# Patient Record
Sex: MALE | Race: WHITE | Employment: OTHER | URBAN - METROPOLITAN AREA
[De-identification: names, ages, dates, MRNs, and addresses within clinical notes are randomized per-mention and may not be internally consistent; named-entity substitution may affect disease eponyms.]

---

## 2018-04-09 RX ORDER — LISINOPRIL 20 MG/1
20 TABLET ORAL DAILY
Qty: 60 TABLET | Refills: 3 | Status: SHIPPED | OUTPATIENT
Start: 2018-04-09 | End: 2018-12-03 | Stop reason: SDUPTHER

## 2018-07-04 ENCOUNTER — HOSPITAL ENCOUNTER (EMERGENCY)
Age: 78
Discharge: HOME | End: 2018-07-04
Attending: EMERGENCY MEDICINE
Payer: MEDICARE

## 2018-07-04 VITALS
TEMPERATURE: 97.6 F | RESPIRATION RATE: 16 BRPM | HEART RATE: 87 BPM | SYSTOLIC BLOOD PRESSURE: 133 MMHG | OXYGEN SATURATION: 97 % | HEIGHT: 70 IN | WEIGHT: 180 LBS | DIASTOLIC BLOOD PRESSURE: 61 MMHG | BODY MASS INDEX: 25.77 KG/M2

## 2018-07-04 DIAGNOSIS — S61.011A LACERATION OF RIGHT THUMB WITHOUT FOREIGN BODY WITHOUT DAMAGE TO NAIL, INITIAL ENCOUNTER: Primary | ICD-10-CM

## 2018-07-04 PROCEDURE — 99282 EMERGENCY DEPT VISIT SF MDM: CPT

## 2018-07-04 PROCEDURE — 12001 RPR S/N/AX/GEN/TRNK 2.5CM/<: CPT

## 2018-07-04 ASSESSMENT — ENCOUNTER SYMPTOMS
EYE DISCHARGE: 0
NAUSEA: 0
BACK PAIN: 0
EYE PAIN: 0
SORE THROAT: 0
EYE REDNESS: 0
ABDOMINAL PAIN: 0
SHORTNESS OF BREATH: 0
VOMITING: 0
SINUS PRESSURE: 0
COUGH: 0
WHEEZING: 0
DIARRHEA: 0

## 2018-07-04 NOTE — ED PROVIDER NOTES
normal heart sounds. No murmur heard. Pulmonary/Chest: Effort normal and breath sounds normal. No respiratory distress. He has no wheezes. He has no rales. Abdominal: Soft. Bowel sounds are normal. There is no tenderness. There is no rebound and no guarding. Musculoskeletal: He exhibits no edema. 1cm curved laceration to lateral aspect of distal R thumb   Neurological: He is alert and oriented to person, place, and time. No cranial nerve deficit. Coordination normal.   Skin: Skin is warm and dry. Nursing note and vitals reviewed. Lac Repair  Date/Time: 7/4/2018 6:39 PM  Performed by: Arlene Holbrook  Authorized by: Adrián De Leon     Consent:     Consent obtained:  Verbal    Consent given by:  Patient    Risks discussed:  Poor cosmetic result and poor wound healing  Anesthesia (see MAR for exact dosages): Anesthesia method:  None  Laceration details:     Location:  Finger    Finger location:  R thumb    Length (cm):  1    Depth (mm):  1  Repair type:     Repair type:  Simple  Treatment:     Area cleansed with:  Soap and water and Shur-Clens    Amount of cleaning:  Standard  Skin repair:     Repair method:  Steri-Strips and tissue adhesive    Number of Steri-Strips:  2  Approximation:     Approximation:  Close  Post-procedure details:     Dressing:  Open (no dressing)    Patient tolerance of procedure: Tolerated well, no immediate complications        MDM    Patient presents to the ED for R finger laceration. Laceration was repaired primarily with steri strips and tissue adhesive. He will f/u with his PMD for wound evaluation. ED Course as of Jul 04 1842   Wed Jul 04, 2018   Wilmer Coreas 1 ATTENDING PROVIDER ATTESTATION:     I have personally performed and/or participated in the history, exam, medical decision making, and procedures and agree with all pertinent clinical information unless otherwise noted.     I have also reviewed and agree with the past medical, family and social history unless 97.6 °F (36.4 °C) (Oral)   Resp 16   Ht 5' 10\" (1.778 m)   Wt 180 lb (81.6 kg)   SpO2 97%   BMI 25.83 kg/m²   Oxygen Saturation Interpretation: Normal      ------------------------------------------ PROGRESS NOTES ------------------------------------------  6:42 PM  I have spoken with the patient and discussed todays results, in addition to providing specific details for the plan of care and counseling regarding the diagnosis and prognosis. Their questions are answered at this time and they are agreeable with the plan. I discussed at length with them reasons for immediate return here for re evaluation. They will followup with their primary care physician by calling their office tomorrow. --------------------------------- ADDITIONAL PROVIDER NOTES ---------------------------------  At this time the patient is without objective evidence of an acute process requiring hospitalization or inpatient management. They have remained hemodynamically stable throughout their entire ED visit and are stable for discharge with outpatient follow-up. The plan has been discussed in detail and they are aware of the specific conditions for emergent return, as well as the importance of follow-up. New Prescriptions    No medications on file       Diagnosis:  1. Laceration of right thumb without foreign body without damage to nail, initial encounter        Disposition:  Patient's disposition: Discharge to home  Patient's condition is stable.          Liudmila Lott,   Resident  07/04/18 [id number]

## 2018-08-16 RX ORDER — FLUOXETINE 10 MG/1
10 CAPSULE ORAL DAILY
Qty: 60 CAPSULE | Refills: 3 | Status: SHIPPED | OUTPATIENT
Start: 2018-08-16 | End: 2019-04-22 | Stop reason: SDUPTHER

## 2018-08-22 LAB
ALBUMIN SERPL-MCNC: 4.1 G/DL
ALP BLD-CCNC: 46 U/L
ALT SERPL-CCNC: 17 U/L
ANION GAP SERPL CALCULATED.3IONS-SCNC: 2 MMOL/L
AST SERPL-CCNC: 20 U/L
BILIRUB SERPL-MCNC: 0.5 MG/DL (ref 0.1–1.4)
BUN BLDV-MCNC: 15 MG/DL
CALCIUM SERPL-MCNC: 9.3 MG/DL
CHLORIDE BLD-SCNC: 106 MMOL/L
CHOLESTEROL, TOTAL: 128 MG/DL
CHOLESTEROL/HDL RATIO: 2.6
CO2: 28 MMOL/L
CREAT SERPL-MCNC: 0.89 MG/DL
GFR CALCULATED: 82
GLUCOSE BLD-MCNC: 104 MG/DL
HDLC SERPL-MCNC: 50 MG/DL (ref 35–70)
LDL CHOLESTEROL CALCULATED: 64 MG/DL (ref 0–160)
POTASSIUM SERPL-SCNC: 5.1 MMOL/L
SODIUM BLD-SCNC: 141 MMOL/L
T4 FREE: 1.1
TOTAL PROTEIN: 6.2
TRIGL SERPL-MCNC: 60 MG/DL
TSH SERPL DL<=0.05 MIU/L-ACNC: 1.75 UIU/ML
VLDLC SERPL CALC-MCNC: NORMAL MG/DL

## 2018-10-02 ENCOUNTER — OFFICE VISIT (OUTPATIENT)
Dept: FAMILY MEDICINE CLINIC | Age: 78
End: 2018-10-02
Payer: MEDICARE

## 2018-10-02 VITALS
WEIGHT: 190 LBS | OXYGEN SATURATION: 94 % | HEIGHT: 70 IN | RESPIRATION RATE: 18 BRPM | BODY MASS INDEX: 27.2 KG/M2 | DIASTOLIC BLOOD PRESSURE: 60 MMHG | HEART RATE: 70 BPM | SYSTOLIC BLOOD PRESSURE: 134 MMHG

## 2018-10-02 DIAGNOSIS — Z00.00 ROUTINE GENERAL MEDICAL EXAMINATION AT A HEALTH CARE FACILITY: ICD-10-CM

## 2018-10-02 DIAGNOSIS — Z79.4 TYPE 2 DIABETES MELLITUS WITHOUT COMPLICATION, WITH LONG-TERM CURRENT USE OF INSULIN (HCC): ICD-10-CM

## 2018-10-02 DIAGNOSIS — Z23 NEED FOR PROPHYLACTIC VACCINATION AGAINST STREPTOCOCCUS PNEUMONIAE (PNEUMOCOCCUS): Primary | ICD-10-CM

## 2018-10-02 DIAGNOSIS — E11.9 TYPE 2 DIABETES MELLITUS WITHOUT COMPLICATION, WITH LONG-TERM CURRENT USE OF INSULIN (HCC): ICD-10-CM

## 2018-10-02 PROCEDURE — 4040F PNEUMOC VAC/ADMIN/RCVD: CPT | Performed by: FAMILY MEDICINE

## 2018-10-02 PROCEDURE — G0009 ADMIN PNEUMOCOCCAL VACCINE: HCPCS | Performed by: FAMILY MEDICINE

## 2018-10-02 PROCEDURE — 90670 PCV13 VACCINE IM: CPT | Performed by: FAMILY MEDICINE

## 2018-10-02 PROCEDURE — G0439 PPPS, SUBSEQ VISIT: HCPCS | Performed by: FAMILY MEDICINE

## 2018-10-02 PROCEDURE — G8484 FLU IMMUNIZE NO ADMIN: HCPCS | Performed by: FAMILY MEDICINE

## 2018-10-02 ASSESSMENT — LIFESTYLE VARIABLES
HOW OFTEN DURING THE LAST YEAR HAVE YOU HAD A FEELING OF GUILT OR REMORSE AFTER DRINKING: 0
HAS A RELATIVE, FRIEND, DOCTOR, OR ANOTHER HEALTH PROFESSIONAL EXPRESSED CONCERN ABOUT YOUR DRINKING OR SUGGESTED YOU CUT DOWN: 0
HOW MANY STANDARD DRINKS CONTAINING ALCOHOL DO YOU HAVE ON A TYPICAL DAY: 0
HOW OFTEN DO YOU HAVE A DRINK CONTAINING ALCOHOL: 1
HOW OFTEN DURING THE LAST YEAR HAVE YOU FOUND THAT YOU WERE NOT ABLE TO STOP DRINKING ONCE YOU HAD STARTED: 0
HOW OFTEN DURING THE LAST YEAR HAVE YOU FAILED TO DO WHAT WAS NORMALLY EXPECTED FROM YOU BECAUSE OF DRINKING: 0
HAVE YOU OR SOMEONE ELSE BEEN INJURED AS A RESULT OF YOUR DRINKING: 0
AUDIT-C TOTAL SCORE: 1
HOW OFTEN DURING THE LAST YEAR HAVE YOU NEEDED AN ALCOHOLIC DRINK FIRST THING IN THE MORNING TO GET YOURSELF GOING AFTER A NIGHT OF HEAVY DRINKING: 0
AUDIT TOTAL SCORE: 1
HOW OFTEN DURING THE LAST YEAR HAVE YOU BEEN UNABLE TO REMEMBER WHAT HAPPENED THE NIGHT BEFORE BECAUSE YOU HAD BEEN DRINKING: 0
HOW OFTEN DO YOU HAVE SIX OR MORE DRINKS ON ONE OCCASION: 0

## 2018-10-02 ASSESSMENT — PATIENT HEALTH QUESTIONNAIRE - PHQ9
SUM OF ALL RESPONSES TO PHQ QUESTIONS 1-9: 0
SUM OF ALL RESPONSES TO PHQ QUESTIONS 1-9: 0

## 2018-10-02 ASSESSMENT — ANXIETY QUESTIONNAIRES: GAD7 TOTAL SCORE: 1

## 2018-10-02 NOTE — PATIENT INSTRUCTIONS
Information Statement  PCV13 Vaccine  11/5/2015  [address] [id number]  Department of Health and Human Services  Centers for Disease Control and Prevention  Many Vaccine Information Statements are available in Swedish and other languages. See www.immunize.org/vis. Muchas hojas de información sobre vacunas están disponibles en español y en otros idiomas. Visite www.immunize.org/vis. Care instructions adapted under license by [facility] ([facility]). If you have questions about a medical condition or this instruction, always ask your healthcare professional. [place] [address] any warranty or liability for your use of this information.

## 2018-10-02 NOTE — PROGRESS NOTES
emotional support that you need?: Yes  Do you have a Living Will?: Yes  General Health Risk Interventions:  · None indicated    Health Habits/Nutrition:  Health Habits/Nutrition  Do you exercise for at least 20 minutes 2-3 times per week?: Yes  Have you lost any weight without trying in the past 3 months?: No  Do you eat fewer than 2 meals per day?: No  Have you seen a dentist within the past year?: (!) No (has dentures)  Body mass index is 27.26 kg/m². Health Habits/Nutrition Interventions:  · None indicated    Hearing/Vision:  Hearing/Vision  Do you or your family notice any trouble with your hearing?: (!) Yes  Do you have difficulty driving, watching TV, or doing any of your daily activities because of your eyesight?: No  Have you had an eye exam within the past year?: Yes  Hearing/Vision Interventions:  · None indicated    Safety:  Safety  Do you have working smoke detectors?: Yes  Have all throw rugs been removed or fastened?: (!) No  Do you have non-slip mats in all bathtubs?: (!) No  Do all of your stairways have a railing or banister?: Yes  Are your doorways, halls and stairs free of clutter?: Yes  Do you always fasten your seatbelt when you are in a car?: Yes  Safety Interventions:  · Patient declines any further evaluation/treatment for this issue    Personalized Preventive Plan   Current Health Maintenance Status  Immunization History   Administered Date(s) Administered    Pneumococcal Polysaccharide ([id number]) 02/07/2017    Tdap (Boostrix, Adacel) 09/30/2016        Health Maintenance   Topic Date Due    Shingles Vaccine (1 of 2 - 2 Dose Series) 10/27/1989    Pneumococcal low/med risk (2 of 2 - PCV13) 02/07/2018    Flu vaccine (1) 09/01/2018    Potassium monitoring  08/22/2019    Creatinine monitoring  08/22/2019    DTaP/Tdap/Td vaccine (2 - Td) 09/30/2026     Recommendations for Preventive Services Due: see orders and patient instructions/AVS.  .   Recommended screening schedule for the next

## 2018-12-03 RX ORDER — LISINOPRIL 20 MG/1
20 TABLET ORAL DAILY
Qty: 60 TABLET | Refills: 3 | Status: SHIPPED | OUTPATIENT
Start: 2018-12-03 | End: 2019-10-21 | Stop reason: ALTCHOICE

## 2019-01-22 ENCOUNTER — OFFICE VISIT (OUTPATIENT)
Dept: FAMILY MEDICINE CLINIC | Age: 79
End: 2019-01-22
Payer: MEDICARE

## 2019-01-22 ENCOUNTER — HOSPITAL ENCOUNTER (OUTPATIENT)
Age: 79
Discharge: HOME | End: 2019-01-24
Payer: MEDICARE

## 2019-01-22 VITALS
BODY MASS INDEX: 26.63 KG/M2 | RESPIRATION RATE: 20 BRPM | HEIGHT: 70 IN | SYSTOLIC BLOOD PRESSURE: 110 MMHG | TEMPERATURE: 98 F | WEIGHT: 186 LBS | OXYGEN SATURATION: 96 % | DIASTOLIC BLOOD PRESSURE: 60 MMHG | HEART RATE: 74 BPM

## 2019-01-22 DIAGNOSIS — Z01.818 PREOP EXAMINATION: Primary | ICD-10-CM

## 2019-01-22 DIAGNOSIS — Z79.4 TYPE 2 DIABETES MELLITUS WITH OTHER SPECIFIED COMPLICATION, WITH LONG-TERM CURRENT USE OF INSULIN (HCC): ICD-10-CM

## 2019-01-22 DIAGNOSIS — E87.5 HYPERKALEMIA: ICD-10-CM

## 2019-01-22 DIAGNOSIS — Z01.818 PREOP EXAMINATION: ICD-10-CM

## 2019-01-22 DIAGNOSIS — E11.69 TYPE 2 DIABETES MELLITUS WITH OTHER SPECIFIED COMPLICATION, WITH LONG-TERM CURRENT USE OF INSULIN (HCC): ICD-10-CM

## 2019-01-22 LAB
ALBUMIN SERPL-MCNC: 4.5 G/DL (ref 3.5–5.2)
ALP BLD-CCNC: 38 U/L (ref 40–129)
ALT SERPL-CCNC: 19 U/L (ref 0–40)
ANION GAP SERPL CALCULATED.3IONS-SCNC: 17 MMOL/L (ref 7–16)
AST SERPL-CCNC: 26 U/L (ref 0–39)
BASOPHILS ABSOLUTE: 0.06 E9/L (ref 0–0.2)
BASOPHILS RELATIVE PERCENT: 0.7 % (ref 0–2)
BILIRUB SERPL-MCNC: 0.7 MG/DL (ref 0–1.2)
BUN BLDV-MCNC: 17 MG/DL (ref 8–23)
CALCIUM SERPL-MCNC: 9.6 MG/DL (ref 8.6–10.2)
CHLORIDE BLD-SCNC: 100 MMOL/L (ref 98–107)
CO2: 24 MMOL/L (ref 22–29)
CREAT SERPL-MCNC: 1.1 MG/DL (ref 0.7–1.2)
EOSINOPHILS ABSOLUTE: 0.03 E9/L (ref 0.05–0.5)
EOSINOPHILS RELATIVE PERCENT: 0.3 % (ref 0–6)
GFR AFRICAN AMERICAN: >60
GFR NON-AFRICAN AMERICAN: >60 ML/MIN/1.73
GLUCOSE BLD-MCNC: 171 MG/DL (ref 74–99)
HCT VFR BLD CALC: 45.4 % (ref 37–54)
HEMOGLOBIN: 14.4 G/DL (ref 12.5–16.5)
IMMATURE GRANULOCYTES #: 0.02 E9/L
IMMATURE GRANULOCYTES %: 0.2 % (ref 0–5)
LYMPHOCYTES ABSOLUTE: 2.1 E9/L (ref 1.5–4)
LYMPHOCYTES RELATIVE PERCENT: 23.6 % (ref 20–42)
MCH RBC QN AUTO: 28.3 PG (ref 26–35)
MCHC RBC AUTO-ENTMCNC: 31.7 % (ref 32–34.5)
MCV RBC AUTO: 89.2 FL (ref 80–99.9)
MONOCYTES ABSOLUTE: 0.57 E9/L (ref 0.1–0.95)
MONOCYTES RELATIVE PERCENT: 6.4 % (ref 2–12)
NEUTROPHILS ABSOLUTE: 6.13 E9/L (ref 1.8–7.3)
NEUTROPHILS RELATIVE PERCENT: 68.8 % (ref 43–80)
PDW BLD-RTO: 13.2 FL (ref 11.5–15)
PLATELET # BLD: 303 E9/L (ref 130–450)
PMV BLD AUTO: 10.6 FL (ref 7–12)
POTASSIUM SERPL-SCNC: 5.5 MMOL/L (ref 3.5–5)
RBC # BLD: 5.09 E12/L (ref 3.8–5.8)
SODIUM BLD-SCNC: 141 MMOL/L (ref 132–146)
TOTAL PROTEIN: 7.3 G/DL (ref 6.4–8.3)
WBC # BLD: 8.9 E9/L (ref 4.5–11.5)

## 2019-01-22 PROCEDURE — 99214 OFFICE O/P EST MOD 30 MIN: CPT | Performed by: FAMILY MEDICINE

## 2019-01-22 PROCEDURE — 1123F ACP DISCUSS/DSCN MKR DOCD: CPT | Performed by: FAMILY MEDICINE

## 2019-01-22 PROCEDURE — 85025 COMPLETE CBC W/AUTO DIFF WBC: CPT

## 2019-01-22 PROCEDURE — G8419 CALC BMI OUT NRM PARAM NOF/U: HCPCS | Performed by: FAMILY MEDICINE

## 2019-01-22 PROCEDURE — 4040F PNEUMOC VAC/ADMIN/RCVD: CPT | Performed by: FAMILY MEDICINE

## 2019-01-22 PROCEDURE — 1036F TOBACCO NON-USER: CPT | Performed by: FAMILY MEDICINE

## 2019-01-22 PROCEDURE — 1101F PT FALLS ASSESS-DOCD LE1/YR: CPT | Performed by: FAMILY MEDICINE

## 2019-01-22 PROCEDURE — 36415 COLL VENOUS BLD VENIPUNCTURE: CPT | Performed by: FAMILY MEDICINE

## 2019-01-22 PROCEDURE — G8427 DOCREV CUR MEDS BY ELIG CLIN: HCPCS | Performed by: FAMILY MEDICINE

## 2019-01-22 PROCEDURE — G8484 FLU IMMUNIZE NO ADMIN: HCPCS | Performed by: FAMILY MEDICINE

## 2019-01-22 PROCEDURE — 80053 COMPREHEN METABOLIC PANEL: CPT

## 2019-01-22 PROCEDURE — 93000 ELECTROCARDIOGRAM COMPLETE: CPT | Performed by: FAMILY MEDICINE

## 2019-01-22 ASSESSMENT — ENCOUNTER SYMPTOMS
COUGH: 0
COLOR CHANGE: 0
BACK PAIN: 0
NAUSEA: 0
CHEST TIGHTNESS: 0
APNEA: 0
SINUS PRESSURE: 0
BLOOD IN STOOL: 0
ABDOMINAL PAIN: 0
SORE THROAT: 0
DIARRHEA: 0
ALLERGIC/IMMUNOLOGIC NEGATIVE: 1
WHEEZING: 0
SHORTNESS OF BREATH: 0
PHOTOPHOBIA: 0
FACIAL SWELLING: 0
VOMITING: 0

## 2019-01-23 DIAGNOSIS — E87.5 HYPERKALEMIA: Primary | ICD-10-CM

## 2019-01-24 ENCOUNTER — NURSE ONLY (OUTPATIENT)
Dept: FAMILY MEDICINE CLINIC | Age: 79
End: 2019-01-24
Payer: MEDICARE

## 2019-01-24 ENCOUNTER — HOSPITAL ENCOUNTER (OUTPATIENT)
Age: 79
Discharge: HOME | End: 2019-01-26
Payer: MEDICARE

## 2019-01-24 DIAGNOSIS — E87.5 HYPERKALEMIA: Primary | ICD-10-CM

## 2019-01-24 LAB — POTASSIUM SERPL-SCNC: 5.1 MMOL/L (ref 3.5–5)

## 2019-01-24 PROCEDURE — 36415 COLL VENOUS BLD VENIPUNCTURE: CPT | Performed by: FAMILY MEDICINE

## 2019-01-24 PROCEDURE — 84132 ASSAY OF SERUM POTASSIUM: CPT

## 2019-02-14 ENCOUNTER — HOSPITAL ENCOUNTER (OUTPATIENT)
Age: 79
Setting detail: OUTPATIENT SURGERY
Discharge: HOME | End: 2019-02-14
Attending: OPHTHALMOLOGY | Admitting: OPHTHALMOLOGY
Payer: MEDICARE

## 2019-02-14 ENCOUNTER — ANESTHESIA (OUTPATIENT)
Dept: OPERATING ROOM | Age: 79
End: 2019-02-14
Payer: MEDICARE

## 2019-02-14 ENCOUNTER — ANESTHESIA EVENT (OUTPATIENT)
Dept: OPERATING ROOM | Age: 79
End: 2019-02-14
Payer: MEDICARE

## 2019-02-14 VITALS
DIASTOLIC BLOOD PRESSURE: 58 MMHG | HEIGHT: 70 IN | WEIGHT: 186 LBS | TEMPERATURE: 97.9 F | HEART RATE: 66 BPM | OXYGEN SATURATION: 94 % | BODY MASS INDEX: 26.63 KG/M2 | RESPIRATION RATE: 18 BRPM | SYSTOLIC BLOOD PRESSURE: 124 MMHG

## 2019-02-14 VITALS — SYSTOLIC BLOOD PRESSURE: 160 MMHG | DIASTOLIC BLOOD PRESSURE: 77 MMHG | OXYGEN SATURATION: 100 %

## 2019-02-14 LAB
METER GLUCOSE: 62 MG/DL (ref 74–99)
METER GLUCOSE: 93 MG/DL (ref 74–99)

## 2019-02-14 PROCEDURE — 6360000002 HC RX W HCPCS: Performed by: NURSE ANESTHETIST, CERTIFIED REGISTERED

## 2019-02-14 PROCEDURE — 2580000003 HC RX 258: Performed by: NURSE ANESTHETIST, CERTIFIED REGISTERED

## 2019-02-14 PROCEDURE — 2500000003 HC RX 250 WO HCPCS: Performed by: OPHTHALMOLOGY

## 2019-02-14 PROCEDURE — 2709999900 HC NON-CHARGEABLE SUPPLY: Performed by: OPHTHALMOLOGY

## 2019-02-14 PROCEDURE — 6370000000 HC RX 637 (ALT 250 FOR IP): Performed by: OPHTHALMOLOGY

## 2019-02-14 PROCEDURE — 82962 GLUCOSE BLOOD TEST: CPT

## 2019-02-14 PROCEDURE — 7100000010 HC PHASE II RECOVERY - FIRST 15 MIN: Performed by: OPHTHALMOLOGY

## 2019-02-14 PROCEDURE — 3600000003 HC SURGERY LEVEL 3 BASE: Performed by: OPHTHALMOLOGY

## 2019-02-14 PROCEDURE — 7100000011 HC PHASE II RECOVERY - ADDTL 15 MIN: Performed by: OPHTHALMOLOGY

## 2019-02-14 PROCEDURE — 2720000010 HC SURG SUPPLY STERILE: Performed by: OPHTHALMOLOGY

## 2019-02-14 PROCEDURE — 3700000001 HC ADD 15 MINUTES (ANESTHESIA): Performed by: OPHTHALMOLOGY

## 2019-02-14 PROCEDURE — 3600000013 HC SURGERY LEVEL 3 ADDTL 15MIN: Performed by: OPHTHALMOLOGY

## 2019-02-14 PROCEDURE — 3700000000 HC ANESTHESIA ATTENDED CARE: Performed by: OPHTHALMOLOGY

## 2019-02-14 PROCEDURE — 2580000003 HC RX 258: Performed by: OPHTHALMOLOGY

## 2019-02-14 PROCEDURE — 6360000002 HC RX W HCPCS: Performed by: OPHTHALMOLOGY

## 2019-02-14 RX ORDER — PHENYLEPHRINE HCL 2.5 %
1 DROPS OPHTHALMIC (EYE) PRN
Status: COMPLETED | OUTPATIENT
Start: 2019-02-14 | End: 2019-02-14

## 2019-02-14 RX ORDER — SODIUM CHLORIDE 0.9 % (FLUSH) 0.9 %
10 SYRINGE (ML) INJECTION PRN
Status: DISCONTINUED | OUTPATIENT
Start: 2019-02-14 | End: 2019-02-14 | Stop reason: HOSPADM

## 2019-02-14 RX ORDER — SODIUM CHLORIDE 0.9 % (FLUSH) 0.9 %
10 SYRINGE (ML) INJECTION EVERY 12 HOURS SCHEDULED
Status: DISCONTINUED | OUTPATIENT
Start: 2019-02-14 | End: 2019-02-14 | Stop reason: HOSPADM

## 2019-02-14 RX ORDER — ONDANSETRON 2 MG/ML
4 INJECTION INTRAMUSCULAR; INTRAVENOUS EVERY 6 HOURS PRN
Status: CANCELLED | OUTPATIENT
Start: 2019-02-14

## 2019-02-14 RX ORDER — DEXAMETHASONE SODIUM PHOSPHATE 10 MG/ML
INJECTION INTRAMUSCULAR; INTRAVENOUS PRN
Status: DISCONTINUED | OUTPATIENT
Start: 2019-02-14 | End: 2019-02-14 | Stop reason: ALTCHOICE

## 2019-02-14 RX ORDER — SODIUM CHLORIDE 0.9 % (FLUSH) 0.9 %
10 SYRINGE (ML) INJECTION PRN
Status: CANCELLED | OUTPATIENT
Start: 2019-02-14

## 2019-02-14 RX ORDER — SODIUM CHLORIDE 0.9 % (FLUSH) 0.9 %
10 SYRINGE (ML) INJECTION EVERY 12 HOURS SCHEDULED
Status: CANCELLED | OUTPATIENT
Start: 2019-02-14

## 2019-02-14 RX ORDER — ACETAMINOPHEN 325 MG/1
650 TABLET ORAL EVERY 4 HOURS PRN
Status: CANCELLED | OUTPATIENT
Start: 2019-02-14

## 2019-02-14 RX ORDER — PHENYLEPHRINE HYDROCHLORIDE 100 MG/ML
1 SOLUTION/ DROPS OPHTHALMIC PRN
Status: DISCONTINUED | OUTPATIENT
Start: 2019-02-14 | End: 2019-02-14 | Stop reason: HOSPADM

## 2019-02-14 RX ORDER — CYCLOPENTOLATE HYDROCHLORIDE 10 MG/ML
1 SOLUTION/ DROPS OPHTHALMIC PRN
Status: COMPLETED | OUTPATIENT
Start: 2019-02-14 | End: 2019-02-14

## 2019-02-14 RX ORDER — MIDAZOLAM HYDROCHLORIDE 1 MG/ML
INJECTION INTRAMUSCULAR; INTRAVENOUS PRN
Status: DISCONTINUED | OUTPATIENT
Start: 2019-02-14 | End: 2019-02-14 | Stop reason: SDUPTHER

## 2019-02-14 RX ORDER — SODIUM CHLORIDE 9 MG/ML
INJECTION, SOLUTION INTRAVENOUS CONTINUOUS PRN
Status: DISCONTINUED | OUTPATIENT
Start: 2019-02-14 | End: 2019-02-14 | Stop reason: SDUPTHER

## 2019-02-14 RX ORDER — PROPARACAINE HYDROCHLORIDE 5 MG/ML
1 SOLUTION/ DROPS OPHTHALMIC PRN
Status: DISCONTINUED | OUTPATIENT
Start: 2019-02-14 | End: 2019-02-14 | Stop reason: HOSPADM

## 2019-02-14 RX ORDER — CEFAZOLIN SODIUM 1 G/3ML
INJECTION, POWDER, FOR SOLUTION INTRAMUSCULAR; INTRAVENOUS PRN
Status: DISCONTINUED | OUTPATIENT
Start: 2019-02-14 | End: 2019-02-14 | Stop reason: ALTCHOICE

## 2019-02-14 RX ORDER — TROPICAMIDE 10 MG/ML
1 SOLUTION/ DROPS OPHTHALMIC PRN
Status: COMPLETED | OUTPATIENT
Start: 2019-02-14 | End: 2019-02-14

## 2019-02-14 RX ADMIN — PHENYLEPHRINE HYDROCHLORIDE 1 DROP: 25 SOLUTION/ DROPS OPHTHALMIC at 12:43

## 2019-02-14 RX ADMIN — PHENYLEPHRINE HYDROCHLORIDE 1 DROP: 25 SOLUTION/ DROPS OPHTHALMIC at 12:34

## 2019-02-14 RX ADMIN — Medication 1 DROP: at 13:01

## 2019-02-14 RX ADMIN — Medication 1 DROP: at 12:34

## 2019-02-14 RX ADMIN — Medication 1 DROP: at 12:43

## 2019-02-14 RX ADMIN — PHENYLEPHRINE HYDROCHLORIDE 1 DROP: 25 SOLUTION/ DROPS OPHTHALMIC at 13:01

## 2019-02-14 RX ADMIN — Medication 1 DROP: at 13:00

## 2019-02-14 RX ADMIN — MIDAZOLAM HYDROCHLORIDE 2 MG: 1 INJECTION, SOLUTION INTRAMUSCULAR; INTRAVENOUS at 13:57

## 2019-02-14 RX ADMIN — SODIUM CHLORIDE: 9 INJECTION, SOLUTION INTRAVENOUS at 13:40

## 2019-02-26 LAB
CHOLESTEROL, TOTAL: 116 MG/DL
CHOLESTEROL/HDL RATIO: 2.8
CREATININE, URINE: 92
HDLC SERPL-MCNC: 42 MG/DL (ref 35–70)
LDL CHOLESTEROL CALCULATED: 60 MG/DL (ref 0–160)
MICROALBUMIN/CREAT 24H UR: 0.2 MG/G{CREAT}
MICROALBUMIN/CREAT UR-RTO: 2
TRIGL SERPL-MCNC: 67 MG/DL
VLDLC SERPL CALC-MCNC: NORMAL MG/DL

## 2019-04-22 RX ORDER — FLUOXETINE 10 MG/1
10 CAPSULE ORAL DAILY
Qty: 60 CAPSULE | Refills: 3 | Status: SHIPPED | OUTPATIENT
Start: 2019-04-22 | End: 2019-12-01 | Stop reason: SDUPTHER

## 2019-06-26 ENCOUNTER — OFFICE VISIT (OUTPATIENT)
Dept: FAMILY MEDICINE CLINIC | Age: 79
End: 2019-06-26
Payer: MEDICARE

## 2019-06-26 VITALS
TEMPERATURE: 98.2 F | WEIGHT: 185 LBS | SYSTOLIC BLOOD PRESSURE: 126 MMHG | DIASTOLIC BLOOD PRESSURE: 70 MMHG | RESPIRATION RATE: 18 BRPM | BODY MASS INDEX: 27.4 KG/M2 | OXYGEN SATURATION: 95 % | HEIGHT: 69 IN | HEART RATE: 81 BPM

## 2019-06-26 DIAGNOSIS — S99.911A INJURY OF RIGHT ANKLE, INITIAL ENCOUNTER: Primary | ICD-10-CM

## 2019-06-26 DIAGNOSIS — M79.89 LEG SWELLING: ICD-10-CM

## 2019-06-26 PROCEDURE — 99214 OFFICE O/P EST MOD 30 MIN: CPT | Performed by: FAMILY MEDICINE

## 2019-06-26 PROCEDURE — G8427 DOCREV CUR MEDS BY ELIG CLIN: HCPCS | Performed by: FAMILY MEDICINE

## 2019-06-26 PROCEDURE — 4040F PNEUMOC VAC/ADMIN/RCVD: CPT | Performed by: FAMILY MEDICINE

## 2019-06-26 PROCEDURE — 1036F TOBACCO NON-USER: CPT | Performed by: FAMILY MEDICINE

## 2019-06-26 PROCEDURE — 1123F ACP DISCUSS/DSCN MKR DOCD: CPT | Performed by: FAMILY MEDICINE

## 2019-06-26 PROCEDURE — G8419 CALC BMI OUT NRM PARAM NOF/U: HCPCS | Performed by: FAMILY MEDICINE

## 2019-06-26 ASSESSMENT — ENCOUNTER SYMPTOMS
ABDOMINAL PAIN: 0
PHOTOPHOBIA: 0
VOMITING: 0
SORE THROAT: 0
ALLERGIC/IMMUNOLOGIC NEGATIVE: 1
SHORTNESS OF BREATH: 0
COUGH: 0
BACK PAIN: 0
SINUS PRESSURE: 0
APNEA: 0
NAUSEA: 0
COLOR CHANGE: 0
CHEST TIGHTNESS: 0
BLOOD IN STOOL: 0
FACIAL SWELLING: 0
DIARRHEA: 0
WHEEZING: 0

## 2019-06-26 ASSESSMENT — PATIENT HEALTH QUESTIONNAIRE - PHQ9
SUM OF ALL RESPONSES TO PHQ QUESTIONS 1-9: 0
SUM OF ALL RESPONSES TO PHQ9 QUESTIONS 1 & 2: 0
2. FEELING DOWN, DEPRESSED OR HOPELESS: 0
SUM OF ALL RESPONSES TO PHQ QUESTIONS 1-9: 0
1. LITTLE INTEREST OR PLEASURE IN DOING THINGS: 0

## 2019-06-26 NOTE — PROGRESS NOTES
Number of children: None    Years of education: None    Highest education level: None   Occupational History    None   Social Needs    Financial resource strain: None    Food insecurity:     Worry: None     Inability: None    Transportation needs:     Medical: None     Non-medical: None   Tobacco Use    Smoking status: Former Smoker     Packs/day: 1.50     Years: 25.00     Pack years: 37.50     Types: Cigarettes     Last attempt to quit: 1985     Years since quittin.5    Smokeless tobacco: Never Used   Substance and Sexual Activity    Alcohol use: No    Drug use: No    Sexual activity: None   Lifestyle    Physical activity:     Days per week: None     Minutes per session: None    Stress: None   Relationships    Social connections:     Talks on phone: None     Gets together: None     Attends Rastafari service: None     Active member of club or organization: None     Attends meetings of clubs or organizations: None     Relationship status: None    Intimate partner violence:     Fear of current or ex partner: None     Emotionally abused: None     Physically abused: None     Forced sexual activity: None   Other Topics Concern    None   Social History Narrative    None       No family history on file. Review of Systems   Constitutional: Negative. HENT: Negative for congestion, facial swelling, hearing loss, nosebleeds, sinus pressure and sore throat. Eyes: Negative for photophobia and visual disturbance. Respiratory: Negative for apnea, cough, chest tightness, shortness of breath and wheezing. Cardiovascular: Positive for leg swelling. Negative for chest pain and palpitations. Gastrointestinal: Negative for abdominal pain, blood in stool, diarrhea, nausea and vomiting. Genitourinary: Negative for difficulty urinating, frequency and urgency. Musculoskeletal: Positive for gait problem and joint swelling. Negative for arthralgias, back pain and myalgias.    Skin: Negative for color change and rash. Allergic/Immunologic: Negative. Neurological: Negative for syncope, weakness, light-headedness and headaches. Hematological: Negative. Psychiatric/Behavioral: Negative for agitation, behavioral problems, confusion and self-injury. The patient is not nervous/anxious. All other systems reviewed and are negative. Physical Exam   Constitutional: He is oriented to person, place, and time. He appears well-developed and well-nourished. No distress. HENT:   Head: Normocephalic and atraumatic. Nose: Nose normal.   Mouth/Throat: Oropharynx is clear and moist.   Eyes: Pupils are equal, round, and reactive to light. Conjunctivae and EOM are normal.   Neck: Normal range of motion. Neck supple. No JVD present. No thyromegaly present. Cardiovascular: Normal rate, regular rhythm and normal heart sounds. Exam reveals no gallop and no friction rub. No murmur heard. Pulmonary/Chest: Effort normal and breath sounds normal. No respiratory distress. He has no wheezes. Abdominal: Soft. Bowel sounds are normal. He exhibits no distension. There is no tenderness. There is no rebound and no guarding. Musculoskeletal:        Right ankle: He exhibits decreased range of motion, swelling and ecchymosis. Right lower leg: He exhibits swelling. Lymphadenopathy:     He has no cervical adenopathy. Neurological: He is alert and oriented to person, place, and time. He has normal reflexes. No cranial nerve deficit. He exhibits normal muscle tone. Coordination normal.   Skin: Skin is warm and dry. No rash noted. No erythema. Psychiatric: He has a normal mood and affect. His behavior is normal. Judgment normal.   Nursing note and vitals reviewed. ASSESSMENT/PLAN:    Minal Khan was seen today for leg pain. Diagnoses and all orders for this visit:    Injury of right ankle, initial encounter  -     XR ANKLE RIGHT (MIN 3 VIEWS);  Future  -     XR FOOT RIGHT (MIN 3 VIEWS); Future    Leg swelling  -     US DUP LOWER EXTREMITY RIGHT DONNA;  Future            Pierre Sanchez,     6/26/2019  2:16 PM

## 2019-06-28 ENCOUNTER — HOSPITAL ENCOUNTER (OUTPATIENT)
Dept: ULTRASOUND IMAGING | Age: 79
Discharge: HOME | End: 2019-06-30
Payer: MEDICARE

## 2019-06-28 ENCOUNTER — HOSPITAL ENCOUNTER (OUTPATIENT)
Dept: GENERAL RADIOLOGY | Age: 79
Discharge: HOME | End: 2019-06-30
Payer: MEDICARE

## 2019-06-28 ENCOUNTER — HOSPITAL ENCOUNTER (OUTPATIENT)
Age: 79
Discharge: HOME | End: 2019-06-30
Payer: MEDICARE

## 2019-06-28 DIAGNOSIS — S99.911A INJURY OF RIGHT ANKLE, INITIAL ENCOUNTER: ICD-10-CM

## 2019-06-28 DIAGNOSIS — M79.89 LEG SWELLING: ICD-10-CM

## 2019-06-28 PROCEDURE — 73610 X-RAY EXAM OF ANKLE: CPT

## 2019-06-28 PROCEDURE — 93971 EXTREMITY STUDY: CPT

## 2019-06-28 PROCEDURE — 73630 X-RAY EXAM OF FOOT: CPT

## 2019-09-11 ENCOUNTER — OFFICE VISIT (OUTPATIENT)
Dept: PRIMARY CARE CLINIC | Age: 79
End: 2019-09-11
Payer: MEDICARE

## 2019-09-11 VITALS
RESPIRATION RATE: 16 BRPM | HEART RATE: 78 BPM | SYSTOLIC BLOOD PRESSURE: 112 MMHG | DIASTOLIC BLOOD PRESSURE: 60 MMHG | WEIGHT: 181 LBS | HEIGHT: 69 IN | OXYGEN SATURATION: 93 % | BODY MASS INDEX: 26.81 KG/M2

## 2019-09-11 DIAGNOSIS — Z01.818 PREOP EXAMINATION: Primary | ICD-10-CM

## 2019-09-11 PROCEDURE — G8419 CALC BMI OUT NRM PARAM NOF/U: HCPCS | Performed by: FAMILY MEDICINE

## 2019-09-11 PROCEDURE — G8427 DOCREV CUR MEDS BY ELIG CLIN: HCPCS | Performed by: FAMILY MEDICINE

## 2019-09-11 PROCEDURE — 4040F PNEUMOC VAC/ADMIN/RCVD: CPT | Performed by: FAMILY MEDICINE

## 2019-09-11 PROCEDURE — 1036F TOBACCO NON-USER: CPT | Performed by: FAMILY MEDICINE

## 2019-09-11 PROCEDURE — 93000 ELECTROCARDIOGRAM COMPLETE: CPT | Performed by: FAMILY MEDICINE

## 2019-09-11 PROCEDURE — 99214 OFFICE O/P EST MOD 30 MIN: CPT | Performed by: FAMILY MEDICINE

## 2019-09-11 PROCEDURE — 1123F ACP DISCUSS/DSCN MKR DOCD: CPT | Performed by: FAMILY MEDICINE

## 2019-09-11 ASSESSMENT — PATIENT HEALTH QUESTIONNAIRE - PHQ9
SUM OF ALL RESPONSES TO PHQ9 QUESTIONS 1 & 2: 0
SUM OF ALL RESPONSES TO PHQ QUESTIONS 1-9: 0
SUM OF ALL RESPONSES TO PHQ QUESTIONS 1-9: 0
2. FEELING DOWN, DEPRESSED OR HOPELESS: 0
1. LITTLE INTEREST OR PLEASURE IN DOING THINGS: 0

## 2019-09-11 ASSESSMENT — ENCOUNTER SYMPTOMS
APNEA: 0
COLOR CHANGE: 0
SORE THROAT: 0
COUGH: 0
CHEST TIGHTNESS: 0
ALLERGIC/IMMUNOLOGIC NEGATIVE: 1
ABDOMINAL PAIN: 0
BACK PAIN: 0
BLOOD IN STOOL: 0
SHORTNESS OF BREATH: 0
WHEEZING: 0
FACIAL SWELLING: 0
VOMITING: 0
SINUS PRESSURE: 0
NAUSEA: 0
BLURRED VISION: 1
DIARRHEA: 0
PHOTOPHOBIA: 0

## 2019-09-11 NOTE — PROGRESS NOTES
Chief Complaint:   Chief Complaint   Patient presents with    Pre-op Exam     10/10/19, retina flap in left eye. had it doen in feb and it grew back. Eye Problem    The left eye is affected. This is a new problem. The current episode started more than 1 month ago. The problem occurs daily. The problem has been waxing and waning. There was no injury mechanism. The pain is at a severity of 0/10. The patient is experiencing no pain. Associated symptoms include blurred vision. Pertinent negatives include no nausea, photophobia, vomiting or weakness.        Patient Active Problem List   Diagnosis    Essential hypertension, benign    Diabetes mellitus ([address]    Hyperlipidemia       Past Medical History:   Diagnosis Date    Diabetes mellitus without complication ([address]     Hyperlipidemia     Hypertension     Hypothyroidism     Prostate cancer ([address]        Past Surgical History:   Procedure Laterality Date    APPENDECTOMY      CIRCUMCISION      EYE SURGERY      eyelids    VITRECTOMY Left 2/14/2019    PARS PLANA VITRECTOMY 25 GAUGE MACULAR PUCKER REPAIR AIR FLUID EXCHANGE LEFT EYE performed by Wayne Balbuena MD at [facility] OR       Current Outpatient Medications   Medication Sig Dispense Refill    Coenzyme Q10 (CO Q 10 PO) Take 400 mg by mouth      FLUoxetine (PROZAC) 10 MG capsule Take 1 capsule by mouth daily 60 capsule 3    lisinopril (PRINIVIL) 20 MG tablet Take 1 tablet by mouth daily 60 tablet 3    insulin lispro (HUMALOG KWIKPEN) 200 UNIT/ML SOPN pen Inject into the skin 3 times daily (with meals)      insulin glargine (BASAGLAR KWIKPEN) 100 UNIT/ML injection pen Inject 44 Units into the skin nightly      atorvastatin (LIPITOR) 80 MG tablet Take 80 mg by mouth daily      ammonium lactate (AMLACTIN) 12 % cream       levothyroxine (SYNTHROID) 25 MCG tablet Take 25 mcg by mouth Daily       aspirin 81 MG tablet Take 81 mg by mouth daily       No current facility-administered medications for this

## 2019-09-27 ENCOUNTER — NURSE ONLY (OUTPATIENT)
Dept: PRIMARY CARE CLINIC | Age: 79
End: 2019-09-27
Payer: MEDICARE

## 2019-09-27 DIAGNOSIS — Z23 NEED FOR INFLUENZA VACCINATION: Primary | ICD-10-CM

## 2019-09-27 PROCEDURE — 90653 IIV ADJUVANT VACCINE IM: CPT | Performed by: FAMILY MEDICINE

## 2019-09-27 PROCEDURE — G0008 ADMIN INFLUENZA VIRUS VAC: HCPCS | Performed by: FAMILY MEDICINE

## 2019-10-07 RX ORDER — LANCETS
EACH MISCELLANEOUS
Refills: 3 | COMMUNITY
Start: 2019-09-10

## 2019-10-07 RX ORDER — PERPHENAZINE 16 MG/1
TABLET, FILM COATED ORAL
Refills: 3 | COMMUNITY
Start: 2019-09-10

## 2019-10-10 ENCOUNTER — ANESTHESIA EVENT (OUTPATIENT)
Dept: OPERATING ROOM | Age: 79
End: 2019-10-10
Payer: MEDICARE

## 2019-10-10 ENCOUNTER — ANESTHESIA (OUTPATIENT)
Dept: OPERATING ROOM | Age: 79
End: 2019-10-10
Payer: MEDICARE

## 2019-10-10 ENCOUNTER — HOSPITAL ENCOUNTER (OUTPATIENT)
Age: 79
Setting detail: OUTPATIENT SURGERY
Discharge: HOME | End: 2019-10-10
Attending: OPHTHALMOLOGY | Admitting: OPHTHALMOLOGY
Payer: MEDICARE

## 2019-10-10 VITALS
DIASTOLIC BLOOD PRESSURE: 57 MMHG | HEART RATE: 72 BPM | SYSTOLIC BLOOD PRESSURE: 116 MMHG | RESPIRATION RATE: 18 BRPM | BODY MASS INDEX: 25.77 KG/M2 | TEMPERATURE: 97.7 F | WEIGHT: 180 LBS | HEIGHT: 70 IN | OXYGEN SATURATION: 94 %

## 2019-10-10 VITALS — SYSTOLIC BLOOD PRESSURE: 113 MMHG | OXYGEN SATURATION: 99 % | DIASTOLIC BLOOD PRESSURE: 58 MMHG

## 2019-10-10 LAB — METER GLUCOSE: 193 MG/DL (ref 74–99)

## 2019-10-10 PROCEDURE — 3600000013 HC SURGERY LEVEL 3 ADDTL 15MIN: Performed by: OPHTHALMOLOGY

## 2019-10-10 PROCEDURE — 7100000011 HC PHASE II RECOVERY - ADDTL 15 MIN: Performed by: OPHTHALMOLOGY

## 2019-10-10 PROCEDURE — 6370000000 HC RX 637 (ALT 250 FOR IP): Performed by: OPHTHALMOLOGY

## 2019-10-10 PROCEDURE — 2500000003 HC RX 250 WO HCPCS: Performed by: OPHTHALMOLOGY

## 2019-10-10 PROCEDURE — 3700000000 HC ANESTHESIA ATTENDED CARE: Performed by: OPHTHALMOLOGY

## 2019-10-10 PROCEDURE — 82962 GLUCOSE BLOOD TEST: CPT

## 2019-10-10 PROCEDURE — 7100000010 HC PHASE II RECOVERY - FIRST 15 MIN: Performed by: OPHTHALMOLOGY

## 2019-10-10 PROCEDURE — 3700000001 HC ADD 15 MINUTES (ANESTHESIA): Performed by: OPHTHALMOLOGY

## 2019-10-10 PROCEDURE — 2709999900 HC NON-CHARGEABLE SUPPLY: Performed by: OPHTHALMOLOGY

## 2019-10-10 PROCEDURE — 6360000002 HC RX W HCPCS: Performed by: NURSE ANESTHETIST, CERTIFIED REGISTERED

## 2019-10-10 PROCEDURE — 6360000002 HC RX W HCPCS: Performed by: OPHTHALMOLOGY

## 2019-10-10 PROCEDURE — 3600000003 HC SURGERY LEVEL 3 BASE: Performed by: OPHTHALMOLOGY

## 2019-10-10 PROCEDURE — 2580000003 HC RX 258: Performed by: NURSE ANESTHETIST, CERTIFIED REGISTERED

## 2019-10-10 PROCEDURE — 2580000003 HC RX 258: Performed by: OPHTHALMOLOGY

## 2019-10-10 PROCEDURE — 2720000010 HC SURG SUPPLY STERILE: Performed by: OPHTHALMOLOGY

## 2019-10-10 RX ORDER — TROPICAMIDE 10 MG/ML
1 SOLUTION/ DROPS OPHTHALMIC
Status: COMPLETED | OUTPATIENT
Start: 2019-10-10 | End: 2019-10-10

## 2019-10-10 RX ORDER — PROPARACAINE HYDROCHLORIDE 5 MG/ML
1 SOLUTION/ DROPS OPHTHALMIC
Status: COMPLETED | OUTPATIENT
Start: 2019-10-10 | End: 2019-10-10

## 2019-10-10 RX ORDER — CYCLOPENTOLATE HYDROCHLORIDE 10 MG/ML
1 SOLUTION/ DROPS OPHTHALMIC
Status: COMPLETED | OUTPATIENT
Start: 2019-10-10 | End: 2019-10-10

## 2019-10-10 RX ORDER — PHENYLEPHRINE HYDROCHLORIDE 100 MG/ML
1 SOLUTION/ DROPS OPHTHALMIC EVERY 5 MIN PRN
Status: DISCONTINUED | OUTPATIENT
Start: 2019-10-10 | End: 2019-10-10 | Stop reason: HOSPADM

## 2019-10-10 RX ORDER — FENTANYL CITRATE 50 UG/ML
INJECTION, SOLUTION INTRAMUSCULAR; INTRAVENOUS PRN
Status: DISCONTINUED | OUTPATIENT
Start: 2019-10-10 | End: 2019-10-10 | Stop reason: SDUPTHER

## 2019-10-10 RX ORDER — DEXAMETHASONE SODIUM PHOSPHATE 10 MG/ML
INJECTION INTRAMUSCULAR; INTRAVENOUS PRN
Status: DISCONTINUED | OUTPATIENT
Start: 2019-10-10 | End: 2019-10-10 | Stop reason: ALTCHOICE

## 2019-10-10 RX ORDER — PHENYLEPHRINE HYDROCHLORIDE 100 MG/ML
1 SOLUTION/ DROPS OPHTHALMIC
Status: DISCONTINUED | OUTPATIENT
Start: 2019-10-10 | End: 2019-10-10

## 2019-10-10 RX ORDER — SODIUM CHLORIDE 0.9 % (FLUSH) 0.9 %
10 SYRINGE (ML) INJECTION EVERY 12 HOURS SCHEDULED
Status: DISCONTINUED | OUTPATIENT
Start: 2019-10-10 | End: 2019-10-10 | Stop reason: HOSPADM

## 2019-10-10 RX ORDER — PHENYLEPHRINE HCL 2.5 %
1 DROPS OPHTHALMIC (EYE) EVERY 10 MIN PRN
Status: DISCONTINUED | OUTPATIENT
Start: 2019-10-10 | End: 2019-10-10

## 2019-10-10 RX ORDER — PHENYLEPHRINE HCL 2.5 %
1 DROPS OPHTHALMIC (EYE)
Status: COMPLETED | OUTPATIENT
Start: 2019-10-10 | End: 2019-10-10

## 2019-10-10 RX ORDER — SODIUM CHLORIDE 0.9 % (FLUSH) 0.9 %
10 SYRINGE (ML) INJECTION PRN
Status: DISCONTINUED | OUTPATIENT
Start: 2019-10-10 | End: 2019-10-10 | Stop reason: HOSPADM

## 2019-10-10 RX ORDER — SODIUM CHLORIDE 0.9 % (FLUSH) 0.9 %
10 SYRINGE (ML) INJECTION PRN
Status: CANCELLED | OUTPATIENT
Start: 2019-10-10

## 2019-10-10 RX ORDER — SODIUM CHLORIDE 0.9 % (FLUSH) 0.9 %
10 SYRINGE (ML) INJECTION EVERY 12 HOURS SCHEDULED
Status: CANCELLED | OUTPATIENT
Start: 2019-10-10

## 2019-10-10 RX ORDER — CEFAZOLIN SODIUM 1 G/3ML
INJECTION, POWDER, FOR SOLUTION INTRAMUSCULAR; INTRAVENOUS PRN
Status: DISCONTINUED | OUTPATIENT
Start: 2019-10-10 | End: 2019-10-10 | Stop reason: ALTCHOICE

## 2019-10-10 RX ORDER — ONDANSETRON 2 MG/ML
4 INJECTION INTRAMUSCULAR; INTRAVENOUS EVERY 6 HOURS PRN
Status: CANCELLED | OUTPATIENT
Start: 2019-10-10

## 2019-10-10 RX ORDER — SODIUM CHLORIDE, SODIUM LACTATE, POTASSIUM CHLORIDE, CALCIUM CHLORIDE 600; 310; 30; 20 MG/100ML; MG/100ML; MG/100ML; MG/100ML
INJECTION, SOLUTION INTRAVENOUS CONTINUOUS PRN
Status: DISCONTINUED | OUTPATIENT
Start: 2019-10-10 | End: 2019-10-10 | Stop reason: SDUPTHER

## 2019-10-10 RX ADMIN — Medication 1 DROP: at 13:17

## 2019-10-10 RX ADMIN — Medication 1 DROP: at 13:12

## 2019-10-10 RX ADMIN — FENTANYL CITRATE 50 MCG: 50 INJECTION, SOLUTION INTRAMUSCULAR; INTRAVENOUS at 14:17

## 2019-10-10 RX ADMIN — Medication 1 DROP: at 13:21

## 2019-10-10 RX ADMIN — PHENYLEPHRINE HYDROCHLORIDE 1 DROP: 25 SOLUTION/ DROPS OPHTHALMIC at 13:21

## 2019-10-10 RX ADMIN — SODIUM CHLORIDE, POTASSIUM CHLORIDE, SODIUM LACTATE AND CALCIUM CHLORIDE: 600; 310; 30; 20 INJECTION, SOLUTION INTRAVENOUS at 14:04

## 2019-10-10 RX ADMIN — FENTANYL CITRATE 50 MCG: 50 INJECTION, SOLUTION INTRAMUSCULAR; INTRAVENOUS at 14:06

## 2019-10-10 RX ADMIN — Medication 1 DROP: at 13:11

## 2019-10-10 RX ADMIN — Medication 1 DROP: at 13:22

## 2019-10-10 RX ADMIN — PHENYLEPHRINE HYDROCHLORIDE 1 DROP: 25 SOLUTION/ DROPS OPHTHALMIC at 13:12

## 2019-10-10 RX ADMIN — PHENYLEPHRINE HYDROCHLORIDE 1 DROP: 25 SOLUTION/ DROPS OPHTHALMIC at 13:16

## 2019-10-10 RX ADMIN — Medication 1 DROP: at 13:16

## 2019-10-10 ASSESSMENT — PULMONARY FUNCTION TESTS
PIF_VALUE: 0

## 2019-10-10 ASSESSMENT — PAIN - FUNCTIONAL ASSESSMENT: PAIN_FUNCTIONAL_ASSESSMENT: 0-10

## 2019-10-10 ASSESSMENT — PAIN SCALES - GENERAL
PAINLEVEL_OUTOF10: 0
PAINLEVEL_OUTOF10: 0

## 2019-10-14 ENCOUNTER — APPOINTMENT (OUTPATIENT)
Dept: CT IMAGING | Age: 79
End: 2019-10-14
Payer: MEDICARE

## 2019-10-14 ENCOUNTER — HOSPITAL ENCOUNTER (EMERGENCY)
Age: 79
Discharge: HOME | End: 2019-10-14
Payer: MEDICARE

## 2019-10-14 ENCOUNTER — APPOINTMENT (OUTPATIENT)
Dept: GENERAL RADIOLOGY | Age: 79
End: 2019-10-14
Payer: MEDICARE

## 2019-10-14 VITALS
BODY MASS INDEX: 25.2 KG/M2 | DIASTOLIC BLOOD PRESSURE: 61 MMHG | WEIGHT: 180 LBS | HEART RATE: 79 BPM | HEIGHT: 71 IN | SYSTOLIC BLOOD PRESSURE: 116 MMHG | RESPIRATION RATE: 15 BRPM | OXYGEN SATURATION: 93 % | TEMPERATURE: 98.8 F

## 2019-10-14 DIAGNOSIS — R05.9 COUGH: Primary | ICD-10-CM

## 2019-10-14 DIAGNOSIS — R91.8 LUNG MASS: ICD-10-CM

## 2019-10-14 LAB
ALBUMIN SERPL-MCNC: 4.4 G/DL (ref 3.5–5.2)
ALP BLD-CCNC: 55 U/L (ref 40–129)
ALT SERPL-CCNC: 16 U/L (ref 0–40)
ANION GAP SERPL CALCULATED.3IONS-SCNC: 11 MMOL/L (ref 7–16)
AST SERPL-CCNC: 17 U/L (ref 0–39)
BASOPHILS ABSOLUTE: 0.07 E9/L (ref 0–0.2)
BASOPHILS RELATIVE PERCENT: 0.6 % (ref 0–2)
BILIRUB SERPL-MCNC: 0.4 MG/DL (ref 0–1.2)
BUN BLDV-MCNC: 18 MG/DL (ref 8–23)
CALCIUM SERPL-MCNC: 9.7 MG/DL (ref 8.6–10.2)
CHLORIDE BLD-SCNC: 99 MMOL/L (ref 98–107)
CO2: 27 MMOL/L (ref 22–29)
CREAT SERPL-MCNC: 0.9 MG/DL (ref 0.7–1.2)
EOSINOPHILS ABSOLUTE: 0.07 E9/L (ref 0.05–0.5)
EOSINOPHILS RELATIVE PERCENT: 0.6 % (ref 0–6)
GFR AFRICAN AMERICAN: >60
GFR NON-AFRICAN AMERICAN: >60 ML/MIN/1.73
GLUCOSE BLD-MCNC: 165 MG/DL (ref 74–99)
HCT VFR BLD CALC: 46.2 % (ref 37–54)
HEMOGLOBIN: 14.3 G/DL (ref 12.5–16.5)
IMMATURE GRANULOCYTES #: 0.04 E9/L
IMMATURE GRANULOCYTES %: 0.4 % (ref 0–5)
LYMPHOCYTES ABSOLUTE: 2.64 E9/L (ref 1.5–4)
LYMPHOCYTES RELATIVE PERCENT: 23.4 % (ref 20–42)
MCH RBC QN AUTO: 27.5 PG (ref 26–35)
MCHC RBC AUTO-ENTMCNC: 31 % (ref 32–34.5)
MCV RBC AUTO: 88.8 FL (ref 80–99.9)
MONOCYTES ABSOLUTE: 0.78 E9/L (ref 0.1–0.95)
MONOCYTES RELATIVE PERCENT: 6.9 % (ref 2–12)
NEUTROPHILS ABSOLUTE: 7.68 E9/L (ref 1.8–7.3)
NEUTROPHILS RELATIVE PERCENT: 68.1 % (ref 43–80)
PDW BLD-RTO: 12.9 FL (ref 11.5–15)
PLATELET # BLD: 393 E9/L (ref 130–450)
PMV BLD AUTO: 9.6 FL (ref 7–12)
POTASSIUM REFLEX MAGNESIUM: 5.5 MMOL/L (ref 3.5–5)
RBC # BLD: 5.2 E12/L (ref 3.8–5.8)
SODIUM BLD-SCNC: 137 MMOL/L (ref 132–146)
TOTAL PROTEIN: 7.4 G/DL (ref 6.4–8.3)
WBC # BLD: 11.3 E9/L (ref 4.5–11.5)

## 2019-10-14 PROCEDURE — 70220 X-RAY EXAM OF SINUSES: CPT

## 2019-10-14 PROCEDURE — 36415 COLL VENOUS BLD VENIPUNCTURE: CPT

## 2019-10-14 PROCEDURE — 6360000004 HC RX CONTRAST MEDICATION: Performed by: RADIOLOGY

## 2019-10-14 PROCEDURE — 71260 CT THORAX DX C+: CPT

## 2019-10-14 PROCEDURE — 85025 COMPLETE CBC W/AUTO DIFF WBC: CPT

## 2019-10-14 PROCEDURE — 99284 EMERGENCY DEPT VISIT MOD MDM: CPT

## 2019-10-14 PROCEDURE — 80053 COMPREHEN METABOLIC PANEL: CPT

## 2019-10-14 PROCEDURE — 71046 X-RAY EXAM CHEST 2 VIEWS: CPT

## 2019-10-14 PROCEDURE — 2580000003 HC RX 258: Performed by: NURSE PRACTITIONER

## 2019-10-14 RX ORDER — 0.9 % SODIUM CHLORIDE 0.9 %
1000 INTRAVENOUS SOLUTION INTRAVENOUS ONCE
Status: COMPLETED | OUTPATIENT
Start: 2019-10-14 | End: 2019-10-14

## 2019-10-14 RX ADMIN — IOPAMIDOL 80 ML: 755 INJECTION, SOLUTION INTRAVENOUS at 18:59

## 2019-10-14 RX ADMIN — SODIUM CHLORIDE 1000 ML: 9 INJECTION, SOLUTION INTRAVENOUS at 17:48

## 2019-10-14 ASSESSMENT — PAIN SCALES - GENERAL: PAINLEVEL_OUTOF10: 1

## 2019-10-14 ASSESSMENT — PAIN DESCRIPTION - ORIENTATION: ORIENTATION: LEFT

## 2019-10-14 ASSESSMENT — PAIN DESCRIPTION - LOCATION: LOCATION: CHEST

## 2019-10-14 ASSESSMENT — PAIN DESCRIPTION - PAIN TYPE: TYPE: ACUTE PAIN

## 2019-10-15 ENCOUNTER — OFFICE VISIT (OUTPATIENT)
Dept: PRIMARY CARE CLINIC | Age: 79
End: 2019-10-15
Payer: MEDICARE

## 2019-10-15 VITALS
BODY MASS INDEX: 25.1 KG/M2 | HEART RATE: 90 BPM | OXYGEN SATURATION: 95 % | DIASTOLIC BLOOD PRESSURE: 58 MMHG | SYSTOLIC BLOOD PRESSURE: 96 MMHG | TEMPERATURE: 98.2 F | RESPIRATION RATE: 18 BRPM | HEIGHT: 71 IN

## 2019-10-15 DIAGNOSIS — R42 DIZZINESS: ICD-10-CM

## 2019-10-15 DIAGNOSIS — B96.89 ACUTE BACTERIAL SINUSITIS: Primary | ICD-10-CM

## 2019-10-15 DIAGNOSIS — R91.8 LUNG MASS: ICD-10-CM

## 2019-10-15 DIAGNOSIS — J01.90 ACUTE BACTERIAL SINUSITIS: Primary | ICD-10-CM

## 2019-10-15 PROCEDURE — 4040F PNEUMOC VAC/ADMIN/RCVD: CPT | Performed by: FAMILY MEDICINE

## 2019-10-15 PROCEDURE — G8419 CALC BMI OUT NRM PARAM NOF/U: HCPCS | Performed by: FAMILY MEDICINE

## 2019-10-15 PROCEDURE — 1123F ACP DISCUSS/DSCN MKR DOCD: CPT | Performed by: FAMILY MEDICINE

## 2019-10-15 PROCEDURE — G8482 FLU IMMUNIZE ORDER/ADMIN: HCPCS | Performed by: FAMILY MEDICINE

## 2019-10-15 PROCEDURE — 1036F TOBACCO NON-USER: CPT | Performed by: FAMILY MEDICINE

## 2019-10-15 PROCEDURE — 99214 OFFICE O/P EST MOD 30 MIN: CPT | Performed by: FAMILY MEDICINE

## 2019-10-15 PROCEDURE — G8427 DOCREV CUR MEDS BY ELIG CLIN: HCPCS | Performed by: FAMILY MEDICINE

## 2019-10-15 RX ORDER — CEFDINIR 300 MG/1
300 CAPSULE ORAL 2 TIMES DAILY
Qty: 20 CAPSULE | Refills: 0 | Status: SHIPPED | OUTPATIENT
Start: 2019-10-15 | End: 2019-10-25

## 2019-10-15 RX ORDER — FLUTICASONE PROPIONATE 50 MCG
1 SPRAY, SUSPENSION (ML) NASAL DAILY
Qty: 1 BOTTLE | Refills: 3 | Status: SHIPPED
Start: 2019-10-15 | End: 2020-02-27 | Stop reason: ALTCHOICE

## 2019-10-15 ASSESSMENT — ENCOUNTER SYMPTOMS
APNEA: 0
EYES NEGATIVE: 1
BLOOD IN STOOL: 0
SINUS PRESSURE: 1
SORE THROAT: 0
WHEEZING: 0
CHEST TIGHTNESS: 0
COLOR CHANGE: 0
COUGH: 1
VOMITING: 0
RHINORRHEA: 1
SHORTNESS OF BREATH: 0
BACK PAIN: 0
ABDOMINAL PAIN: 0
TROUBLE SWALLOWING: 0
ALLERGIC/IMMUNOLOGIC NEGATIVE: 1
FACIAL SWELLING: 0
NAUSEA: 0
PHOTOPHOBIA: 0
DIARRHEA: 0

## 2019-10-24 ENCOUNTER — ANESTHESIA EVENT (OUTPATIENT)
Dept: ENDOSCOPY | Age: 79
End: 2019-10-24
Payer: MEDICARE

## 2019-10-24 ENCOUNTER — APPOINTMENT (OUTPATIENT)
Dept: GENERAL RADIOLOGY | Age: 79
End: 2019-10-24
Attending: INTERNAL MEDICINE
Payer: MEDICARE

## 2019-10-24 ENCOUNTER — HOSPITAL ENCOUNTER (OUTPATIENT)
Age: 79
Setting detail: OUTPATIENT SURGERY
Discharge: HOME | End: 2019-10-24
Attending: INTERNAL MEDICINE | Admitting: INTERNAL MEDICINE
Payer: MEDICARE

## 2019-10-24 ENCOUNTER — ANESTHESIA (OUTPATIENT)
Dept: ENDOSCOPY | Age: 79
End: 2019-10-24
Payer: MEDICARE

## 2019-10-24 VITALS
OXYGEN SATURATION: 92 % | WEIGHT: 180 LBS | DIASTOLIC BLOOD PRESSURE: 76 MMHG | BODY MASS INDEX: 25.77 KG/M2 | RESPIRATION RATE: 20 BRPM | HEIGHT: 70 IN | SYSTOLIC BLOOD PRESSURE: 175 MMHG | TEMPERATURE: 97 F | HEART RATE: 82 BPM

## 2019-10-24 VITALS
SYSTOLIC BLOOD PRESSURE: 140 MMHG | OXYGEN SATURATION: 98 % | DIASTOLIC BLOOD PRESSURE: 55 MMHG | RESPIRATION RATE: 27 BRPM

## 2019-10-24 LAB — METER GLUCOSE: 151 MG/DL (ref 74–99)

## 2019-10-24 PROCEDURE — 88342 IMHCHEM/IMCYTCHM 1ST ANTB: CPT

## 2019-10-24 PROCEDURE — 88112 CYTOPATH CELL ENHANCE TECH: CPT

## 2019-10-24 PROCEDURE — 6360000002 HC RX W HCPCS: Performed by: NURSE ANESTHETIST, CERTIFIED REGISTERED

## 2019-10-24 PROCEDURE — 6370000000 HC RX 637 (ALT 250 FOR IP): Performed by: INTERNAL MEDICINE

## 2019-10-24 PROCEDURE — 7100000010 HC PHASE II RECOVERY - FIRST 15 MIN: Performed by: INTERNAL MEDICINE

## 2019-10-24 PROCEDURE — 3609011800 HC BRONCHOSCOPY/TRANSBRONCHIAL LUNG BIOPSY

## 2019-10-24 PROCEDURE — 82962 GLUCOSE BLOOD TEST: CPT

## 2019-10-24 PROCEDURE — 88341 IMHCHEM/IMCYTCHM EA ADD ANTB: CPT

## 2019-10-24 PROCEDURE — 3609011900 HC BRONCHOSCOPY NEEDLE BX TRACHEA MAIN STEM&/BRON

## 2019-10-24 PROCEDURE — 3609011100 HC BRONCHOSCOPY BRUSHINGS: Performed by: INTERNAL MEDICINE

## 2019-10-24 PROCEDURE — 7100000011 HC PHASE II RECOVERY - ADDTL 15 MIN: Performed by: INTERNAL MEDICINE

## 2019-10-24 PROCEDURE — 71045 X-RAY EXAM CHEST 1 VIEW: CPT

## 2019-10-24 PROCEDURE — 2709999900 HC NON-CHARGEABLE SUPPLY: Performed by: INTERNAL MEDICINE

## 2019-10-24 PROCEDURE — 2500000003 HC RX 250 WO HCPCS: Performed by: INTERNAL MEDICINE

## 2019-10-24 PROCEDURE — 2580000003 HC RX 258: Performed by: NURSE ANESTHETIST, CERTIFIED REGISTERED

## 2019-10-24 PROCEDURE — 3700000001 HC ADD 15 MINUTES (ANESTHESIA): Performed by: INTERNAL MEDICINE

## 2019-10-24 PROCEDURE — 88305 TISSUE EXAM BY PATHOLOGIST: CPT

## 2019-10-24 PROCEDURE — 3700000000 HC ANESTHESIA ATTENDED CARE: Performed by: INTERNAL MEDICINE

## 2019-10-24 PROCEDURE — 88173 CYTOPATH EVAL FNA REPORT: CPT

## 2019-10-24 PROCEDURE — 3209999900 FLUORO FOR SURGICAL PROCEDURES

## 2019-10-24 RX ORDER — LIDOCAINE HYDROCHLORIDE 20 MG/ML
INJECTION, SOLUTION EPIDURAL; INFILTRATION; INTRACAUDAL; PERINEURAL PRN
Status: DISCONTINUED | OUTPATIENT
Start: 2019-10-24 | End: 2019-10-24 | Stop reason: ALTCHOICE

## 2019-10-24 RX ORDER — SODIUM CHLORIDE 9 MG/ML
INJECTION, SOLUTION INTRAVENOUS CONTINUOUS PRN
Status: DISCONTINUED | OUTPATIENT
Start: 2019-10-24 | End: 2019-10-24 | Stop reason: SDUPTHER

## 2019-10-24 RX ORDER — LIDOCAINE HYDROCHLORIDE 20 MG/ML
SOLUTION OROPHARYNGEAL PRN
Status: DISCONTINUED | OUTPATIENT
Start: 2019-10-24 | End: 2019-10-24 | Stop reason: ALTCHOICE

## 2019-10-24 RX ORDER — PROPOFOL 10 MG/ML
INJECTION, EMULSION INTRAVENOUS PRN
Status: DISCONTINUED | OUTPATIENT
Start: 2019-10-24 | End: 2019-10-24 | Stop reason: SDUPTHER

## 2019-10-24 RX ADMIN — PROPOFOL 250 MG: 10 INJECTION, EMULSION INTRAVENOUS at 12:07

## 2019-10-24 RX ADMIN — SODIUM CHLORIDE: 9 INJECTION, SOLUTION INTRAVENOUS at 11:51

## 2019-10-24 ASSESSMENT — PAIN SCALES - GENERAL
PAINLEVEL_OUTOF10: 0

## 2019-10-24 ASSESSMENT — PAIN - FUNCTIONAL ASSESSMENT
PAIN_FUNCTIONAL_ASSESSMENT: ACTIVITIES ARE NOT PREVENTED
PAIN_FUNCTIONAL_ASSESSMENT: 0-10

## 2019-10-24 ASSESSMENT — PAIN DESCRIPTION - DESCRIPTORS: DESCRIPTORS: SORE

## 2019-11-11 ENCOUNTER — HOSPITAL ENCOUNTER (OUTPATIENT)
Dept: PET IMAGING | Age: 79
Discharge: HOME | End: 2019-11-13
Payer: MEDICARE

## 2019-11-11 ENCOUNTER — HOSPITAL ENCOUNTER (OUTPATIENT)
Dept: MRI IMAGING | Age: 79
Discharge: HOME | End: 2019-11-13
Payer: MEDICARE

## 2019-11-11 DIAGNOSIS — C34.92 NON-SMALL CELL LUNG CANCER, LEFT (HCC): ICD-10-CM

## 2019-11-11 DIAGNOSIS — C34.12 CANCER OF BRONCHUS OF LEFT UPPER LOBE (HCC): ICD-10-CM

## 2019-11-11 DIAGNOSIS — C34.12 MALIGNANT NEOPLASM OF UPPER LOBE, LEFT BRONCHUS OR LUNG (HCC): ICD-10-CM

## 2019-11-11 LAB — METER GLUCOSE: 195 MG/DL (ref 74–99)

## 2019-11-11 PROCEDURE — 70553 MRI BRAIN STEM W/O & W/DYE: CPT

## 2019-11-11 PROCEDURE — 3430000000 HC RX DIAGNOSTIC RADIOPHARMACEUTICAL: Performed by: RADIOLOGY

## 2019-11-11 PROCEDURE — A9552 F18 FDG: HCPCS | Performed by: RADIOLOGY

## 2019-11-11 PROCEDURE — A9577 INJ MULTIHANCE: HCPCS | Performed by: RADIOLOGY

## 2019-11-11 PROCEDURE — 78815 PET IMAGE W/CT SKULL-THIGH: CPT

## 2019-11-11 PROCEDURE — 6360000004 HC RX CONTRAST MEDICATION: Performed by: RADIOLOGY

## 2019-11-11 PROCEDURE — 82962 GLUCOSE BLOOD TEST: CPT

## 2019-11-11 RX ORDER — FLUDEOXYGLUCOSE F 18 200 MCI/ML
15 INJECTION, SOLUTION INTRAVENOUS
Status: COMPLETED | OUTPATIENT
Start: 2019-11-11 | End: 2019-11-11

## 2019-11-11 RX ADMIN — FLUDEOXYGLUCOSE F 18 15 MILLICURIE: 200 INJECTION, SOLUTION INTRAVENOUS at 08:12

## 2019-11-11 RX ADMIN — GADOBENATE DIMEGLUMINE 16 ML: 529 INJECTION, SOLUTION INTRAVENOUS at 10:30

## 2019-11-18 ENCOUNTER — HOSPITAL ENCOUNTER (OUTPATIENT)
Dept: RADIATION ONCOLOGY | Age: 79
Discharge: HOME | End: 2019-11-18
Payer: MEDICARE

## 2019-11-18 ENCOUNTER — TELEPHONE (OUTPATIENT)
Dept: CASE MANAGEMENT | Age: 79
End: 2019-11-18

## 2019-11-18 VITALS
TEMPERATURE: 98 F | HEART RATE: 74 BPM | RESPIRATION RATE: 16 BRPM | BODY MASS INDEX: 24.68 KG/M2 | SYSTOLIC BLOOD PRESSURE: 130 MMHG | WEIGHT: 172 LBS | DIASTOLIC BLOOD PRESSURE: 84 MMHG

## 2019-11-18 DIAGNOSIS — C34.12 MALIGNANT NEOPLASM OF UPPER LOBE OF LEFT LUNG (HCC): Primary | ICD-10-CM

## 2019-11-18 PROCEDURE — 99204 OFFICE O/P NEW MOD 45 MIN: CPT | Performed by: RADIOLOGY

## 2019-11-18 PROCEDURE — 99205 OFFICE O/P NEW HI 60 MIN: CPT

## 2019-11-18 RX ORDER — TRAMADOL HYDROCHLORIDE 50 MG/1
TABLET ORAL
Refills: 0 | COMMUNITY
Start: 2019-11-05 | End: 2020-02-27

## 2019-11-18 SDOH — ECONOMIC STABILITY: HOUSING INSECURITY: PLEASE ASSESS YOUR PATIENT'S LEVEL OF DISTRESS CONCERNING HOUSING (SCALE FROM 1-10): 0 (NONE)

## 2019-11-19 ENCOUNTER — TELEPHONE (OUTPATIENT)
Dept: RADIATION ONCOLOGY | Age: 79
End: 2019-11-19

## 2019-11-19 ENCOUNTER — HOSPITAL ENCOUNTER (OUTPATIENT)
Dept: RADIATION ONCOLOGY | Age: 79
Discharge: HOME | End: 2019-11-19
Attending: RADIOLOGY
Payer: MEDICARE

## 2019-11-19 ENCOUNTER — TELEPHONE (OUTPATIENT)
Dept: CASE MANAGEMENT | Age: 79
End: 2019-11-19

## 2019-11-19 PROCEDURE — 77334 RADIATION TREATMENT AID(S): CPT | Performed by: RADIOLOGY

## 2019-11-20 PROCEDURE — 77301 RADIOTHERAPY DOSE PLAN IMRT: CPT | Performed by: RADIOLOGY

## 2019-11-20 PROCEDURE — 77338 DESIGN MLC DEVICE FOR IMRT: CPT | Performed by: RADIOLOGY

## 2019-11-20 PROCEDURE — 77300 RADIATION THERAPY DOSE PLAN: CPT | Performed by: RADIOLOGY

## 2019-11-21 ENCOUNTER — HOSPITAL ENCOUNTER (OUTPATIENT)
Dept: RADIATION ONCOLOGY | Age: 79
Discharge: HOME | End: 2019-11-21
Attending: RADIOLOGY
Payer: MEDICARE

## 2019-11-21 ENCOUNTER — HOSPITAL ENCOUNTER (OUTPATIENT)
Dept: RADIATION ONCOLOGY | Age: 79
Discharge: HOME | End: 2019-11-21
Payer: MEDICARE

## 2019-11-21 VITALS — BODY MASS INDEX: 24.68 KG/M2 | WEIGHT: 172 LBS

## 2019-11-21 DIAGNOSIS — C34.12 MALIGNANT NEOPLASM OF UPPER LOBE OF LEFT LUNG (HCC): Primary | ICD-10-CM

## 2019-11-21 PROCEDURE — 99999 PR OFFICE/OUTPT VISIT,PROCEDURE ONLY: CPT | Performed by: RADIOLOGY

## 2019-11-21 PROCEDURE — 77386 HC NTSTY MODUL RAD TX DLVR CPLX: CPT | Performed by: RADIOLOGY

## 2019-11-22 ENCOUNTER — APPOINTMENT (OUTPATIENT)
Dept: RADIATION ONCOLOGY | Age: 79
End: 2019-11-22
Attending: RADIOLOGY
Payer: MEDICARE

## 2019-11-22 PROCEDURE — 77386 HC NTSTY MODUL RAD TX DLVR CPLX: CPT | Performed by: RADIOLOGY

## 2019-11-24 ENCOUNTER — HOSPITAL ENCOUNTER (OUTPATIENT)
Dept: RADIATION ONCOLOGY | Age: 79
Discharge: HOME | End: 2019-11-24
Attending: RADIOLOGY
Payer: MEDICARE

## 2019-11-24 PROCEDURE — 77386 HC NTSTY MODUL RAD TX DLVR CPLX: CPT | Performed by: RADIOLOGY

## 2019-11-25 ENCOUNTER — HOSPITAL ENCOUNTER (OUTPATIENT)
Dept: RADIATION ONCOLOGY | Age: 79
Discharge: HOME | End: 2019-11-25
Payer: MEDICARE

## 2019-11-25 ENCOUNTER — APPOINTMENT (OUTPATIENT)
Dept: RADIATION ONCOLOGY | Age: 79
End: 2019-11-25
Attending: RADIOLOGY
Payer: MEDICARE

## 2019-11-25 VITALS — WEIGHT: 170 LBS | BODY MASS INDEX: 24.39 KG/M2

## 2019-11-25 DIAGNOSIS — C34.12 MALIGNANT NEOPLASM OF UPPER LOBE OF LEFT LUNG (HCC): Primary | ICD-10-CM

## 2019-11-25 PROCEDURE — 99999 PR OFFICE/OUTPT VISIT,PROCEDURE ONLY: CPT | Performed by: RADIOLOGY

## 2019-11-25 PROCEDURE — 77386 HC NTSTY MODUL RAD TX DLVR CPLX: CPT | Performed by: RADIOLOGY

## 2019-11-26 ENCOUNTER — HOSPITAL ENCOUNTER (OUTPATIENT)
Dept: RADIATION ONCOLOGY | Age: 79
Discharge: HOME | End: 2019-11-26
Attending: RADIOLOGY
Payer: MEDICARE

## 2019-11-26 PROCEDURE — 77336 RADIATION PHYSICS CONSULT: CPT | Performed by: RADIOLOGY

## 2019-11-26 PROCEDURE — 77386 HC NTSTY MODUL RAD TX DLVR CPLX: CPT | Performed by: RADIOLOGY

## 2019-11-27 ENCOUNTER — HOSPITAL ENCOUNTER (OUTPATIENT)
Dept: RADIATION ONCOLOGY | Age: 79
Discharge: HOME | End: 2019-11-27
Attending: RADIOLOGY
Payer: MEDICARE

## 2019-11-27 PROCEDURE — 77386 HC NTSTY MODUL RAD TX DLVR CPLX: CPT | Performed by: RADIOLOGY

## 2019-11-29 ENCOUNTER — APPOINTMENT (OUTPATIENT)
Dept: RADIATION ONCOLOGY | Age: 79
End: 2019-11-29
Attending: RADIOLOGY
Payer: MEDICARE

## 2019-12-02 ENCOUNTER — HOSPITAL ENCOUNTER (OUTPATIENT)
Dept: RADIATION ONCOLOGY | Age: 79
Discharge: HOME | End: 2019-12-02
Attending: RADIOLOGY
Payer: MEDICARE

## 2019-12-02 PROCEDURE — 77386 HC NTSTY MODUL RAD TX DLVR CPLX: CPT | Performed by: RADIOLOGY

## 2019-12-03 ENCOUNTER — HOSPITAL ENCOUNTER (OUTPATIENT)
Dept: RADIATION ONCOLOGY | Age: 79
Discharge: HOME | End: 2019-12-03
Attending: RADIOLOGY
Payer: MEDICARE

## 2019-12-03 PROCEDURE — 77386 HC NTSTY MODUL RAD TX DLVR CPLX: CPT | Performed by: RADIOLOGY

## 2019-12-04 ENCOUNTER — HOSPITAL ENCOUNTER (OUTPATIENT)
Dept: RADIATION ONCOLOGY | Age: 79
Discharge: HOME | End: 2019-12-04
Attending: RADIOLOGY
Payer: MEDICARE

## 2019-12-04 VITALS — WEIGHT: 172 LBS | BODY MASS INDEX: 24.68 KG/M2

## 2019-12-04 DIAGNOSIS — C34.12 MALIGNANT NEOPLASM OF UPPER LOBE OF LEFT LUNG (HCC): Primary | ICD-10-CM

## 2019-12-04 PROCEDURE — 99999 PR OFFICE/OUTPT VISIT,PROCEDURE ONLY: CPT | Performed by: RADIOLOGY

## 2019-12-04 PROCEDURE — 77386 HC NTSTY MODUL RAD TX DLVR CPLX: CPT | Performed by: RADIOLOGY

## 2019-12-05 ENCOUNTER — HOSPITAL ENCOUNTER (OUTPATIENT)
Dept: RADIATION ONCOLOGY | Age: 79
Discharge: HOME | End: 2019-12-05
Attending: RADIOLOGY
Payer: MEDICARE

## 2019-12-05 PROCEDURE — 77386 HC NTSTY MODUL RAD TX DLVR CPLX: CPT | Performed by: RADIOLOGY

## 2019-12-05 PROCEDURE — 77336 RADIATION PHYSICS CONSULT: CPT | Performed by: RADIOLOGY

## 2019-12-06 ENCOUNTER — TELEPHONE (OUTPATIENT)
Dept: CASE MANAGEMENT | Age: 79
End: 2019-12-06

## 2019-12-06 ENCOUNTER — HOSPITAL ENCOUNTER (OUTPATIENT)
Dept: RADIATION ONCOLOGY | Age: 79
Discharge: HOME | End: 2019-12-06
Attending: RADIOLOGY
Payer: MEDICARE

## 2019-12-06 PROCEDURE — 77386 HC NTSTY MODUL RAD TX DLVR CPLX: CPT | Performed by: RADIOLOGY

## 2019-12-09 ENCOUNTER — TELEPHONE (OUTPATIENT)
Dept: RADIATION ONCOLOGY | Age: 79
End: 2019-12-09

## 2019-12-09 ENCOUNTER — HOSPITAL ENCOUNTER (OUTPATIENT)
Dept: RADIATION ONCOLOGY | Age: 79
Discharge: HOME | End: 2019-12-09
Attending: RADIOLOGY
Payer: MEDICARE

## 2019-12-09 PROCEDURE — 77386 HC NTSTY MODUL RAD TX DLVR CPLX: CPT | Performed by: RADIOLOGY

## 2019-12-10 ENCOUNTER — HOSPITAL ENCOUNTER (OUTPATIENT)
Dept: RADIATION ONCOLOGY | Age: 79
Discharge: HOME | End: 2019-12-10
Attending: RADIOLOGY
Payer: MEDICARE

## 2019-12-10 PROCEDURE — 77386 HC NTSTY MODUL RAD TX DLVR CPLX: CPT | Performed by: RADIOLOGY

## 2019-12-11 ENCOUNTER — HOSPITAL ENCOUNTER (OUTPATIENT)
Dept: RADIATION ONCOLOGY | Age: 79
Discharge: HOME | End: 2019-12-11
Attending: RADIOLOGY
Payer: MEDICARE

## 2019-12-11 VITALS — WEIGHT: 170 LBS | BODY MASS INDEX: 24.39 KG/M2

## 2019-12-11 DIAGNOSIS — R05.9 COUGH: ICD-10-CM

## 2019-12-11 DIAGNOSIS — C34.12 MALIGNANT NEOPLASM OF UPPER LOBE OF LEFT LUNG (HCC): Primary | ICD-10-CM

## 2019-12-11 PROCEDURE — 77386 HC NTSTY MODUL RAD TX DLVR CPLX: CPT | Performed by: RADIOLOGY

## 2019-12-11 PROCEDURE — 99999 PR OFFICE/OUTPT VISIT,PROCEDURE ONLY: CPT | Performed by: RADIOLOGY

## 2019-12-11 RX ORDER — GUAIFENESIN AND CODEINE PHOSPHATE 100; 10 MG/5ML; MG/5ML
5 SOLUTION ORAL EVERY 4 HOURS PRN
Qty: 200 ML | Refills: 0 | Status: SHIPPED | OUTPATIENT
Start: 2019-12-11 | End: 2019-12-18

## 2019-12-12 ENCOUNTER — HOSPITAL ENCOUNTER (OUTPATIENT)
Dept: RADIATION ONCOLOGY | Age: 79
Discharge: HOME | End: 2019-12-12
Attending: RADIOLOGY
Payer: MEDICARE

## 2019-12-12 PROCEDURE — 77386 HC NTSTY MODUL RAD TX DLVR CPLX: CPT | Performed by: RADIOLOGY

## 2019-12-12 PROCEDURE — 77336 RADIATION PHYSICS CONSULT: CPT | Performed by: RADIOLOGY

## 2019-12-13 ENCOUNTER — HOSPITAL ENCOUNTER (OUTPATIENT)
Dept: RADIATION ONCOLOGY | Age: 79
Discharge: HOME | End: 2019-12-13
Attending: RADIOLOGY
Payer: MEDICARE

## 2019-12-13 PROCEDURE — 77386 HC NTSTY MODUL RAD TX DLVR CPLX: CPT | Performed by: RADIOLOGY

## 2019-12-16 ENCOUNTER — HOSPITAL ENCOUNTER (OUTPATIENT)
Dept: RADIATION ONCOLOGY | Age: 79
Discharge: HOME | End: 2019-12-16
Attending: RADIOLOGY
Payer: MEDICARE

## 2019-12-16 PROCEDURE — 77386 HC NTSTY MODUL RAD TX DLVR CPLX: CPT | Performed by: RADIOLOGY

## 2019-12-17 ENCOUNTER — HOSPITAL ENCOUNTER (OUTPATIENT)
Dept: RADIATION ONCOLOGY | Age: 79
Discharge: HOME | End: 2019-12-17
Attending: RADIOLOGY
Payer: MEDICARE

## 2019-12-17 PROCEDURE — 77386 HC NTSTY MODUL RAD TX DLVR CPLX: CPT | Performed by: RADIOLOGY

## 2019-12-18 ENCOUNTER — HOSPITAL ENCOUNTER (OUTPATIENT)
Dept: RADIATION ONCOLOGY | Age: 79
Discharge: HOME | End: 2019-12-18
Attending: RADIOLOGY
Payer: MEDICARE

## 2019-12-18 VITALS — WEIGHT: 170 LBS | BODY MASS INDEX: 24.39 KG/M2

## 2019-12-18 DIAGNOSIS — C34.12 MALIGNANT NEOPLASM OF UPPER LOBE OF LEFT LUNG (HCC): Primary | ICD-10-CM

## 2019-12-18 PROCEDURE — 99999 PR OFFICE/OUTPT VISIT,PROCEDURE ONLY: CPT | Performed by: RADIOLOGY

## 2019-12-18 PROCEDURE — 77386 HC NTSTY MODUL RAD TX DLVR CPLX: CPT | Performed by: RADIOLOGY

## 2019-12-19 ENCOUNTER — HOSPITAL ENCOUNTER (OUTPATIENT)
Dept: RADIATION ONCOLOGY | Age: 79
Discharge: HOME | End: 2019-12-19
Attending: RADIOLOGY
Payer: MEDICARE

## 2019-12-19 PROCEDURE — 77386 HC NTSTY MODUL RAD TX DLVR CPLX: CPT | Performed by: RADIOLOGY

## 2019-12-19 PROCEDURE — 77336 RADIATION PHYSICS CONSULT: CPT | Performed by: RADIOLOGY

## 2019-12-20 ENCOUNTER — HOSPITAL ENCOUNTER (OUTPATIENT)
Dept: RADIATION ONCOLOGY | Age: 79
Discharge: HOME | End: 2019-12-20
Attending: RADIOLOGY
Payer: MEDICARE

## 2019-12-20 PROCEDURE — 77386 HC NTSTY MODUL RAD TX DLVR CPLX: CPT | Performed by: RADIOLOGY

## 2019-12-23 ENCOUNTER — APPOINTMENT (OUTPATIENT)
Dept: RADIATION ONCOLOGY | Age: 79
End: 2019-12-23
Attending: RADIOLOGY
Payer: MEDICARE

## 2019-12-23 PROCEDURE — 77386 HC NTSTY MODUL RAD TX DLVR CPLX: CPT | Performed by: RADIOLOGY

## 2019-12-24 ENCOUNTER — HOSPITAL ENCOUNTER (OUTPATIENT)
Dept: RADIATION ONCOLOGY | Age: 79
Discharge: HOME | End: 2019-12-24
Attending: RADIOLOGY
Payer: MEDICARE

## 2019-12-24 VITALS
OXYGEN SATURATION: 94 % | RESPIRATION RATE: 18 BRPM | BODY MASS INDEX: 24.74 KG/M2 | DIASTOLIC BLOOD PRESSURE: 64 MMHG | HEART RATE: 95 BPM | SYSTOLIC BLOOD PRESSURE: 132 MMHG | WEIGHT: 172.4 LBS

## 2019-12-24 DIAGNOSIS — C34.12 MALIGNANT NEOPLASM OF UPPER LOBE OF LEFT LUNG (HCC): Primary | ICD-10-CM

## 2019-12-24 PROCEDURE — 77386 HC NTSTY MODUL RAD TX DLVR CPLX: CPT | Performed by: RADIOLOGY

## 2019-12-24 PROCEDURE — 99999 PR OFFICE/OUTPT VISIT,PROCEDURE ONLY: CPT | Performed by: RADIOLOGY

## 2019-12-26 ENCOUNTER — HOSPITAL ENCOUNTER (OUTPATIENT)
Dept: RADIATION ONCOLOGY | Age: 79
Discharge: HOME | End: 2019-12-26
Attending: RADIOLOGY
Payer: MEDICARE

## 2019-12-26 LAB
ALBUMIN SERPL-MCNC: 4.1 G/DL
ALP BLD-CCNC: 55 U/L
ALT SERPL-CCNC: 20 U/L
ANION GAP SERPL CALCULATED.3IONS-SCNC: NORMAL MMOL/L
AST SERPL-CCNC: 16 U/L
BASOPHILS ABSOLUTE: 10 /ΜL
BASOPHILS RELATIVE PERCENT: 0.2 %
BILIRUB SERPL-MCNC: 0.4 MG/DL (ref 0.1–1.4)
BUN BLDV-MCNC: 21 MG/DL
CALCIUM SERPL-MCNC: 9.2 MG/DL
CHLORIDE BLD-SCNC: 98 MMOL/L
CO2: 26 MMOL/L
CREAT SERPL-MCNC: 0.72 MG/DL
EOSINOPHILS ABSOLUTE: 0 /ΜL
EOSINOPHILS RELATIVE PERCENT: 0 %
GFR CALCULATED: 88
GLUCOSE BLD-MCNC: 309 MG/DL
HCT VFR BLD CALC: 37.6 % (ref 41–53)
HEMOGLOBIN: 12.3 G/DL (ref 13.5–17.5)
LYMPHOCYTES ABSOLUTE: 230 /ΜL
LYMPHOCYTES RELATIVE PERCENT: 4.6 %
MCH RBC QN AUTO: 27.8 PG
MCHC RBC AUTO-ENTMCNC: 32.7 G/DL
MCV RBC AUTO: 84.9 FL
MONOCYTES ABSOLUTE: 140 /ΜL
MONOCYTES RELATIVE PERCENT: 2.8 %
NEUTROPHILS ABSOLUTE: 4620 /ΜL
NEUTROPHILS RELATIVE PERCENT: 92.4 %
PLATELET # BLD: 248 K/ΜL
PMV BLD AUTO: 8.9 FL
POTASSIUM SERPL-SCNC: 5.3 MMOL/L
RBC # BLD: 4.43 10^6/ΜL
SODIUM BLD-SCNC: 133 MMOL/L
TOTAL PROTEIN: 6.5
WBC # BLD: 5 10^3/ML

## 2019-12-26 PROCEDURE — 77386 HC NTSTY MODUL RAD TX DLVR CPLX: CPT | Performed by: RADIOLOGY

## 2019-12-27 ENCOUNTER — HOSPITAL ENCOUNTER (OUTPATIENT)
Dept: RADIATION ONCOLOGY | Age: 79
Discharge: HOME | End: 2019-12-27
Attending: RADIOLOGY
Payer: MEDICARE

## 2019-12-27 PROCEDURE — 77386 HC NTSTY MODUL RAD TX DLVR CPLX: CPT | Performed by: RADIOLOGY

## 2019-12-27 PROCEDURE — 77336 RADIATION PHYSICS CONSULT: CPT | Performed by: RADIOLOGY

## 2019-12-30 ENCOUNTER — HOSPITAL ENCOUNTER (OUTPATIENT)
Dept: RADIATION ONCOLOGY | Age: 79
Discharge: HOME | End: 2019-12-30
Attending: RADIOLOGY
Payer: MEDICARE

## 2019-12-30 PROCEDURE — 77386 HC NTSTY MODUL RAD TX DLVR CPLX: CPT | Performed by: RADIOLOGY

## 2019-12-31 ENCOUNTER — HOSPITAL ENCOUNTER (OUTPATIENT)
Dept: RADIATION ONCOLOGY | Age: 79
Discharge: HOME | End: 2019-12-31
Attending: RADIOLOGY
Payer: MEDICARE

## 2019-12-31 PROCEDURE — 77386 HC NTSTY MODUL RAD TX DLVR CPLX: CPT | Performed by: RADIOLOGY

## 2020-01-02 ENCOUNTER — HOSPITAL ENCOUNTER (OUTPATIENT)
Dept: RADIATION ONCOLOGY | Age: 80
Discharge: HOME | End: 2020-01-02
Attending: RADIOLOGY
Payer: MEDICARE

## 2020-01-02 PROCEDURE — 77386 HC NTSTY MODUL RAD TX DLVR CPLX: CPT | Performed by: RADIOLOGY

## 2020-01-03 ENCOUNTER — HOSPITAL ENCOUNTER (OUTPATIENT)
Dept: RADIATION ONCOLOGY | Age: 80
Discharge: HOME | End: 2020-01-03
Attending: RADIOLOGY
Payer: MEDICARE

## 2020-01-03 VITALS — WEIGHT: 174 LBS | BODY MASS INDEX: 24.97 KG/M2

## 2020-01-03 PROCEDURE — 77386 HC NTSTY MODUL RAD TX DLVR CPLX: CPT | Performed by: RADIOLOGY

## 2020-01-03 NOTE — PROGRESS NOTES
DEPARTMENT OF RADIATION ONCOLOGY   ON TREATMENT VISIT       1/3/2020      NAME:  Tangela Lr OF BIRTH:  1940      Diagnosis:  1. Malignant neoplasm of upper lobe of left lung (HCC)        SUBJECTIVE:   [place] status post 5800 cGy to the left upper lung mass plus lymph nodes. Had his final chemotherapy yesterday. Feels a little fatigued today but otherwise stable. No dysphagia, no respiratory symptoms. Hoarseness is better. Physical Examination: Mild erythema radiation portal      Wt Readings from Last 3 Encounters:   01/03/20 174 lb (78.9 kg)   12/24/19 172 lb 6.4 oz (78.2 kg)   12/18/19 170 lb (77.1 kg)       Labs:  Lab Results   Component Value Date    WBC 5.0 12/26/2019    RBC 4.43 12/26/2019    HGB 12.3 12/26/2019    HCT 37.6 12/26/2019    MCV 84.9 12/26/2019    MCH 27.8 12/26/2019    MCHC 32.7 12/26/2019    RDW 12.9 10/14/2019     12/26/2019    MPV 8.9 12/26/2019            ASSESSMENT/PLAN:     Continue treatment as planned      Khadijah Tiwari M.D.   Radiation Oncologist  [place]: [phone]   Martin Hardy: [phone] Alanis: [phone]   Martin Hardy: [phone])

## 2020-01-04 ENCOUNTER — APPOINTMENT (OUTPATIENT)
Dept: RADIATION ONCOLOGY | Age: 80
End: 2020-01-04
Attending: RADIOLOGY
Payer: MEDICARE

## 2020-01-06 ENCOUNTER — HOSPITAL ENCOUNTER (OUTPATIENT)
Dept: RADIATION ONCOLOGY | Age: 80
Discharge: HOME | End: 2020-01-06
Attending: RADIOLOGY
Payer: MEDICARE

## 2020-01-06 PROCEDURE — 77336 RADIATION PHYSICS CONSULT: CPT | Performed by: RADIOLOGY

## 2020-01-06 PROCEDURE — 77386 HC NTSTY MODUL RAD TX DLVR CPLX: CPT | Performed by: RADIOLOGY

## 2020-01-08 ENCOUNTER — TELEPHONE (OUTPATIENT)
Dept: CASE MANAGEMENT | Age: 80
End: 2020-01-08

## 2020-01-10 NOTE — PROGRESS NOTES
Radiation Treatment Summary    Patient Name:  Angela Cowan,  1940,  [de-identified] y.o., male       Referring Physician: Dr. Akshat Durán    PCP: Sky Sandoval DO       Diagnosis: NSCLC, adenocarcinoma, left upper lung, stage T2a N2 M0-IIIA       Narrative: Patient just completed definitive radiation therapy with concurrent chemotherapy for the left upper lung mass and involved lymph nodes. Treatment start: 11/21/19    Treatment completion: 1/6/20    Dose: 6000 cGy/30 Fx    Response/Tolerance: Tolerated treatments quite well with improvement of symptoms. Mild fatigue, otherwise no significant side effects.   He was able to complete the expected therapy    Follow-up: 6 weeks      Stef Gaxiola MD  [address]:  [phone]   FAX:    [address]: [phone]   FAX:  [phone]

## 2020-02-04 ENCOUNTER — OFFICE VISIT (OUTPATIENT)
Dept: PRIMARY CARE CLINIC | Age: 80
End: 2020-02-04
Payer: MEDICARE

## 2020-02-04 VITALS
OXYGEN SATURATION: 97 % | TEMPERATURE: 98.4 F | BODY MASS INDEX: 25.2 KG/M2 | HEART RATE: 92 BPM | WEIGHT: 176 LBS | HEIGHT: 70 IN | RESPIRATION RATE: 18 BRPM | SYSTOLIC BLOOD PRESSURE: 108 MMHG | DIASTOLIC BLOOD PRESSURE: 66 MMHG

## 2020-02-04 PROCEDURE — G8427 DOCREV CUR MEDS BY ELIG CLIN: HCPCS | Performed by: FAMILY MEDICINE

## 2020-02-04 PROCEDURE — G8482 FLU IMMUNIZE ORDER/ADMIN: HCPCS | Performed by: FAMILY MEDICINE

## 2020-02-04 PROCEDURE — 1123F ACP DISCUSS/DSCN MKR DOCD: CPT | Performed by: FAMILY MEDICINE

## 2020-02-04 PROCEDURE — G8419 CALC BMI OUT NRM PARAM NOF/U: HCPCS | Performed by: FAMILY MEDICINE

## 2020-02-04 PROCEDURE — 4040F PNEUMOC VAC/ADMIN/RCVD: CPT | Performed by: FAMILY MEDICINE

## 2020-02-04 PROCEDURE — 1036F TOBACCO NON-USER: CPT | Performed by: FAMILY MEDICINE

## 2020-02-04 PROCEDURE — 99213 OFFICE O/P EST LOW 20 MIN: CPT | Performed by: FAMILY MEDICINE

## 2020-02-04 RX ORDER — CEFDINIR 300 MG/1
300 CAPSULE ORAL 2 TIMES DAILY
Qty: 20 CAPSULE | Refills: 0 | Status: SHIPPED | OUTPATIENT
Start: 2020-02-04 | End: 2020-02-14

## 2020-02-04 RX ORDER — FLUOXETINE 10 MG/1
CAPSULE ORAL
Qty: 60 CAPSULE | Refills: 3 | Status: SHIPPED
Start: 2020-02-04 | End: 2020-10-05

## 2020-02-04 ASSESSMENT — ENCOUNTER SYMPTOMS
NAUSEA: 0
DIARRHEA: 0
CHEST TIGHTNESS: 0
SINUS PRESSURE: 1
SHORTNESS OF BREATH: 0
SORE THROAT: 0
PHOTOPHOBIA: 0
ABDOMINAL PAIN: 0
RHINORRHEA: 1
FACIAL SWELLING: 0
WHEEZING: 0
BLOOD IN STOOL: 0
ALLERGIC/IMMUNOLOGIC NEGATIVE: 1
SINUS COMPLAINT: 1
APNEA: 0
TROUBLE SWALLOWING: 0
EYES NEGATIVE: 1
VOMITING: 0
RESPIRATORY NEGATIVE: 1
BACK PAIN: 0
COUGH: 0
COLOR CHANGE: 0

## 2020-02-04 ASSESSMENT — PATIENT HEALTH QUESTIONNAIRE - PHQ9
SUM OF ALL RESPONSES TO PHQ9 QUESTIONS 1 & 2: 0
SUM OF ALL RESPONSES TO PHQ QUESTIONS 1-9: 0
1. LITTLE INTEREST OR PLEASURE IN DOING THINGS: 0
SUM OF ALL RESPONSES TO PHQ QUESTIONS 1-9: 0
2. FEELING DOWN, DEPRESSED OR HOPELESS: 0

## 2020-02-09 ENCOUNTER — HOSPITAL ENCOUNTER (OUTPATIENT)
Dept: CT IMAGING | Age: 80
Discharge: HOME | End: 2020-02-11
Payer: MEDICARE

## 2020-02-09 PROCEDURE — 71260 CT THORAX DX C+: CPT

## 2020-02-09 PROCEDURE — 6360000004 HC RX CONTRAST MEDICATION: Performed by: RADIOLOGY

## 2020-02-09 PROCEDURE — 2580000003 HC RX 258: Performed by: RADIOLOGY

## 2020-02-09 RX ORDER — SODIUM CHLORIDE 0.9 % (FLUSH) 0.9 %
10 SYRINGE (ML) INJECTION PRN
Status: COMPLETED | OUTPATIENT
Start: 2020-02-09 | End: 2020-02-09

## 2020-02-09 RX ADMIN — SODIUM CHLORIDE, PRESERVATIVE FREE 10 ML: 5 INJECTION INTRAVENOUS at 12:25

## 2020-02-09 RX ADMIN — IOPAMIDOL 80 ML: 755 INJECTION, SOLUTION INTRAVENOUS at 12:28

## 2020-02-17 LAB
CREATININE, URINE: 92
MICROALBUMIN/CREAT 24H UR: 0.5 MG/G{CREAT}
MICROALBUMIN/CREAT UR-RTO: 5

## 2020-02-18 LAB
ALBUMIN SERPL-MCNC: 3.5 G/DL
ALP BLD-CCNC: 54 U/L
ALT SERPL-CCNC: 16 U/L
ANION GAP SERPL CALCULATED.3IONS-SCNC: NORMAL MMOL/L
AST SERPL-CCNC: 17 U/L
BILIRUB SERPL-MCNC: 0.5 MG/DL (ref 0.1–1.4)
BUN BLDV-MCNC: 15 MG/DL
CALCIUM SERPL-MCNC: 8.7 MG/DL
CHLORIDE BLD-SCNC: 102 MMOL/L
CHOLESTEROL, TOTAL: 120 MG/DL
CHOLESTEROL/HDL RATIO: 2.9
CO2: 31 MMOL/L
CREAT SERPL-MCNC: 0.83 MG/DL
GFR CALCULATED: 83
GLUCOSE BLD-MCNC: 168 MG/DL
HDLC SERPL-MCNC: 41 MG/DL (ref 35–70)
LDL CHOLESTEROL CALCULATED: 63 MG/DL (ref 0–160)
POTASSIUM SERPL-SCNC: 4.6 MMOL/L
SODIUM BLD-SCNC: 139 MMOL/L
TOTAL PROTEIN: 5.6
TRIGL SERPL-MCNC: 77 MG/DL
VLDLC SERPL CALC-MCNC: NORMAL MG/DL

## 2020-02-27 ENCOUNTER — HOSPITAL ENCOUNTER (OUTPATIENT)
Dept: RADIATION ONCOLOGY | Age: 80
Discharge: HOME | End: 2020-02-27
Attending: RADIOLOGY
Payer: MEDICARE

## 2020-02-27 VITALS
HEIGHT: 70 IN | HEART RATE: 85 BPM | DIASTOLIC BLOOD PRESSURE: 58 MMHG | BODY MASS INDEX: 24.8 KG/M2 | RESPIRATION RATE: 20 BRPM | WEIGHT: 173.25 LBS | OXYGEN SATURATION: 92 % | SYSTOLIC BLOOD PRESSURE: 124 MMHG | TEMPERATURE: 97.9 F

## 2020-02-27 PROCEDURE — 99999 PR OFFICE/OUTPT VISIT,PROCEDURE ONLY: CPT | Performed by: NURSE PRACTITIONER

## 2020-02-27 RX ORDER — ACETAMINOPHEN,DIPHENHYDRAMINE HCL 500; 25 MG/1; MG/1
1 TABLET, FILM COATED ORAL NIGHTLY PRN
COMMUNITY

## 2020-02-27 SDOH — HEALTH STABILITY: MENTAL HEALTH: HOW OFTEN DO YOU HAVE A DRINK CONTAINING ALCOHOL?: MONTHLY OR LESS

## 2020-02-27 NOTE — PROGRESS NOTES
precaution (Yellow sticker Level III) placed on patient chart           MALNUTRITION RISK SCREENING ASSESSMENT    2/27/2020   Patient:  Nica Clay  Sex:  male    Instructions:  Assess the patient and enter the appropriate indicators that are present for nutrition risk identification. Total the numbers entered and assign a risk score. Follow the appropriate action for total score listed below. Assessment   Date  2/27/2020     1. Have you lost weight without trying? 0- No     2. Have you been eating poorly because of a decreased appetite? 0- No   3. Do you have a diagnosis of head and neck cancer? 0- No                                                                                    TOTAL 0          Score of 0-1: No action  Score 2 or greater:  · For Non-Diabetic Patient: Recommend adding Ensure Complete 2 x daily and provide patient with Ensure wellness bag with coupons  · For Diabetic Patient: Recommend adding Glucerna Shake 2 x daily and provide patient with Glucerna Wellness bag with coupons  · Route to the dietitian via Cynthia Luis Armando is a very pleasant [de-identified]years old gentleman, alert and oriented x 4, arrived with a cane with his wife Sulaiman Mane. He is here today for a 6 weeks follow up after completed Radiation Therapy Treatment related to NSCLC adenocarcinoma, ODALIS lung Stage  IIIA [id number]. He received 30 fractions, 6000 cGy to GTV АННА lung + LN from 11/21/19 to 01/06/2020. He follows with Dr. Karyle Keep oncology at [facility], his last appointment was 01/09/2020. Patient had an episode of fall, patient states \"I have neuropathy sometimes my feet can't feel or just gave out\", fall safety teaching and handout given to patient and family they both verbalizes understanding.  Patient has a hoarse voice, patient states \"sometimes my wife she can't hear me\" suggested to patient to have bells around the house so he can use it when he needs to call her for assistance in an emergency situation, patient and wife appreciative of the suggestion.

## 2020-02-27 NOTE — PROGRESS NOTES
coordinating/giving care. >50% of the visit was spent in counseling the pt on the following: Follow up care    The nurses notes were reviewed and incorporated into this assessment and plan. Questions answered to apparent satisfaction.       Luis Mon, MSN, APRN-CNP  Certified Nurse Practitioner for [address]  Phone: [phone]: [phone]

## 2020-02-28 ENCOUNTER — TELEPHONE (OUTPATIENT)
Dept: CASE MANAGEMENT | Age: 80
End: 2020-02-28

## 2020-04-24 ENCOUNTER — TELEPHONE (OUTPATIENT)
Dept: RADIATION ONCOLOGY | Age: 80
End: 2020-04-24

## 2020-04-24 NOTE — TELEPHONE ENCOUNTER
Called patient regarding their upcoming appointment at the [address] [place] [address] Winnie ROSS Discussed concerns considering the current Pandemic with COVID 19. We discussed that while there continue to be daily developments and information regarding this virus, we recognize the need to take all measures to ensure we protect the health and welfare of the patients we serve. Patient states they wish to cancel this follow up and any follow up appointments going forward. They choose to have their surveillance follow ups and imaging managed through medical oncology. He has a CT scheduled in June. They were instructed to contact this clinic with any needs going forward. Speaking Coherently

## 2020-05-01 ENCOUNTER — TELEPHONE (OUTPATIENT)
Dept: CASE MANAGEMENT | Age: 80
End: 2020-05-01

## 2020-05-03 NOTE — PROGRESS NOTES
Aman Shields  1/6/2020  7:49 AM          Current Outpatient Medications   Medication Sig Dispense Refill    FLUoxetine (PROZAC) 10 MG capsule TAKE ONE CAPSULE BY MOUTH DAILY 60 capsule 0    traMADol (ULTRAM) 50 MG tablet TAKE ONE TABLET BY MOUTH EVERY 6 HOURS AS NEEDED FOR PAIN  0    Acetaminophen (TYLENOL PO) Take by mouth nightly      fluticasone (FLONASE) 50 MCG/ACT nasal spray 1 spray by Nasal route daily 1 Bottle 3    Coenzyme Q10 (CO Q 10 PO) Take 200 mg by mouth 2 times daily Ld 10/21/2019      MICROLET LANCETS MISC TEST 4 TIMES DAILY  3    CONTOUR NEXT TEST strip TEST FOUR TIMES DAILY  3    insulin lispro (HUMALOG KWIKPEN) 200 UNIT/ML SOPN pen Inject into the skin 3 times daily (with meals)      insulin glargine (BASAGLAR KWIKPEN) 100 UNIT/ML injection pen Inject 44 Units into the skin nightly Pt instructed to take 1/2 evening dose night before bronchoscopy      atorvastatin (LIPITOR) 80 MG tablet Take 80 mg by mouth nightly       ammonium lactate (AMLACTIN) 12 % cream Apply topically as needed (feet)       levothyroxine (SYNTHROID) 25 MCG tablet Take 25 mcg by mouth Daily       aspirin 81 MG tablet Take 81 mg by mouth daily 5 day hold per dr. Michelle Plummer       No current facility-administered medications for this encounter. This is an up-to-date medication list.    Please take this list to your next care provider, and discard any previous medication lists. No

## 2020-06-02 LAB — DIABETIC RETINOPATHY: NORMAL

## 2020-06-09 ENCOUNTER — HOSPITAL ENCOUNTER (OUTPATIENT)
Dept: CT IMAGING | Age: 80
Discharge: HOME | End: 2020-06-11
Payer: MEDICARE

## 2020-06-09 PROCEDURE — 2580000003 HC RX 258: Performed by: RADIOLOGY

## 2020-06-09 PROCEDURE — 71260 CT THORAX DX C+: CPT

## 2020-06-09 PROCEDURE — 6360000004 HC RX CONTRAST MEDICATION: Performed by: RADIOLOGY

## 2020-06-09 RX ORDER — SODIUM CHLORIDE 0.9 % (FLUSH) 0.9 %
10 SYRINGE (ML) INJECTION 2 TIMES DAILY
Status: DISCONTINUED | OUTPATIENT
Start: 2020-06-09 | End: 2020-06-12 | Stop reason: HOSPADM

## 2020-06-09 RX ADMIN — Medication 10 ML: at 08:53

## 2020-06-09 RX ADMIN — IOPAMIDOL 100 ML: 755 INJECTION, SOLUTION INTRAVENOUS at 08:53

## 2020-07-03 ENCOUNTER — TELEPHONE (OUTPATIENT)
Dept: PRIMARY CARE CLINIC | Age: 80
End: 2020-07-03

## 2020-07-03 RX ORDER — AMOXICILLIN AND CLAVULANATE POTASSIUM 875; 125 MG/1; MG/1
1 TABLET, FILM COATED ORAL 2 TIMES DAILY
Qty: 20 TABLET | Refills: 0 | Status: SHIPPED | OUTPATIENT
Start: 2020-07-03 | End: 2020-07-13

## 2020-07-06 ENCOUNTER — TELEPHONE (OUTPATIENT)
Dept: PRIMARY CARE CLINIC | Age: 80
End: 2020-07-06

## 2020-07-06 RX ORDER — CEFDINIR 300 MG/1
300 CAPSULE ORAL 2 TIMES DAILY
Qty: 20 CAPSULE | Refills: 0 | Status: SHIPPED | OUTPATIENT
Start: 2020-07-06 | End: 2020-07-16

## 2020-08-05 LAB
ALBUMIN SERPL-MCNC: 3.9 G/DL
ALP BLD-CCNC: 64 U/L
ALT SERPL-CCNC: 21 U/L
ANION GAP SERPL CALCULATED.3IONS-SCNC: NORMAL MMOL/L
AST SERPL-CCNC: 19 U/L
BILIRUB SERPL-MCNC: 0.3 MG/DL (ref 0.1–1.4)
BUN BLDV-MCNC: 19 MG/DL
CALCIUM SERPL-MCNC: 9.1 MG/DL
CHLORIDE BLD-SCNC: 97 MMOL/L
CO2: 25 MMOL/L
CREAT SERPL-MCNC: 0.7 MG/DL
GFR CALCULATED: 89
GLUCOSE BLD-MCNC: 359 MG/DL
POTASSIUM SERPL-SCNC: 4.7 MMOL/L
SODIUM BLD-SCNC: 133 MMOL/L
TOTAL PROTEIN: 6.1

## 2020-08-07 ENCOUNTER — TELEPHONE (OUTPATIENT)
Dept: CASE MANAGEMENT | Age: 80
End: 2020-08-07

## 2020-08-12 ENCOUNTER — TELEPHONE (OUTPATIENT)
Dept: RADIATION ONCOLOGY | Age: 80
End: 2020-08-12

## 2020-08-12 NOTE — TELEPHONE ENCOUNTER
Trista Whitten  8/12/2020  Wt Readings from Last 10 Encounters:   07/09/20 174 lb (78.9 kg)   02/27/20 173 lb 4 oz (78.6 kg)   02/04/20 176 lb (79.8 kg)   01/03/20 174 lb (78.9 kg)   12/24/19 172 lb 6.4 oz (78.2 kg)   12/18/19 170 lb (77.1 kg)   12/11/19 170 lb (77.1 kg)   12/04/19 172 lb (78 kg)   11/25/19 170 lb (77.1 kg)   11/21/19 172 lb (78 kg)     Ht Readings from Last 1 Encounters:   07/09/20 5' 10\" (1.778 m)     Spoke with pt's wife, hSima Grandchild, per referral for pt needing 2-3 Boost Glucose Control daily to maintain wt. Pt is s/p concurrent chemoradiation for lung cancer, also with PMH of DM. He has been able to maintain his wt with 3 meals and 2-3 Boost Glucose Control daily. Pt's wife has received coupons to aid with cost of oral nutrition supplements, discussed possibility of Boost being covered partially by insurance. Shima Grandchild was receptive to this and commented that pt prefers chocolate flavor. Pt will need ONS for >90 days and pt is estimated to need >2000 calories each day to maintain his wt. Will fax paperwork to [address]. All questions were answered and will continue to follow. Weight change: Pt has maintained a stable wt x 1 year  Appetite: good  N/V/D/C: none at this time  Calculated Needs if applicable: 9030-6058 kcal/day (28-30 kcal/kg), 105-120 gm protein/day (1.3-1.5 gm/kg), 2400 ml FW/day (30 ml/kg)    Recommendations: Pt is to continue to consume 3 balanced meals and 3 Boost Glucose Control daily. Pt also to consume at least 80 oz of water daily.     [facility] GUIDELINES FOR CLINICAL CHARACTERISTICS OF MALNUTRITION IN CHRONIC ILLNESS     Moderate Malnutrition  Severe Malnutrition    Energy intake  <75% energy intake compared to estimated needs for >1month <75% energy intake compared to estimated needs for >1month   Weight changes  5% x 1 month  7.5% x 3 months   10% x 6 months   20% x 1 year  >5% x 1 month  >7.5% x 3 months   >10% x 6 months   >20% x 1 year    Physical findings  Mild   Decrease subcutaneous fat    Decrease muscle mass     Increase fluid/edema   Severe  Decrease subcutaneous fat    Decrease muscle mass     Increase fluid/edema    No malnutrition at this time.     Cherieene Juli

## 2020-08-26 LAB
ALBUMIN SERPL-MCNC: 4.1 G/DL
ALP BLD-CCNC: 58 U/L
ALT SERPL-CCNC: 19 U/L
ANION GAP SERPL CALCULATED.3IONS-SCNC: NORMAL MMOL/L
AST SERPL-CCNC: 17 U/L
BILIRUB SERPL-MCNC: 0.3 MG/DL (ref 0.1–1.4)
BUN BLDV-MCNC: 21 MG/DL
CALCIUM SERPL-MCNC: 9.6 MG/DL
CHLORIDE BLD-SCNC: 100 MMOL/L
CO2: 25 MMOL/L
CREAT SERPL-MCNC: 0.78 MG/DL
GFR CALCULATED: 85
GLUCOSE BLD-MCNC: NORMAL MG/DL
POTASSIUM SERPL-SCNC: 5 MMOL/L
SODIUM BLD-SCNC: 134 MMOL/L
TOTAL PROTEIN: 6.2

## 2020-09-14 ENCOUNTER — HOSPITAL ENCOUNTER (OUTPATIENT)
Dept: CT IMAGING | Age: 80
Discharge: HOME | End: 2020-09-16
Payer: MEDICARE

## 2020-09-14 PROCEDURE — 71260 CT THORAX DX C+: CPT

## 2020-09-14 PROCEDURE — 74177 CT ABD & PELVIS W/CONTRAST: CPT

## 2020-09-14 PROCEDURE — 6360000004 HC RX CONTRAST MEDICATION: Performed by: RADIOLOGY

## 2020-09-14 PROCEDURE — 2580000003 HC RX 258: Performed by: RADIOLOGY

## 2020-09-14 RX ORDER — SODIUM CHLORIDE 0.9 % (FLUSH) 0.9 %
10 SYRINGE (ML) INJECTION 2 TIMES DAILY
Status: DISCONTINUED | OUTPATIENT
Start: 2020-09-14 | End: 2020-09-17 | Stop reason: HOSPADM

## 2020-09-14 RX ADMIN — IOPAMIDOL 100 ML: 755 INJECTION, SOLUTION INTRAVENOUS at 13:52

## 2020-09-14 RX ADMIN — Medication 10 ML: at 13:52

## 2020-09-14 RX ADMIN — IOHEXOL 50 ML: 240 INJECTION, SOLUTION INTRATHECAL; INTRAVASCULAR; INTRAVENOUS; ORAL at 13:52

## 2020-10-05 RX ORDER — FLUOXETINE 10 MG/1
CAPSULE ORAL
Qty: 60 CAPSULE | Refills: 0 | Status: SHIPPED
Start: 2020-10-05 | End: 2020-12-07 | Stop reason: SDUPTHER

## 2020-10-08 LAB
ALBUMIN SERPL-MCNC: 4.2 G/DL
ALP BLD-CCNC: 65 U/L
ALT SERPL-CCNC: 22 U/L
ANION GAP SERPL CALCULATED.3IONS-SCNC: NORMAL MMOL/L
AST SERPL-CCNC: 21 U/L
BILIRUB SERPL-MCNC: 0.5 MG/DL (ref 0.1–1.4)
BUN BLDV-MCNC: 23 MG/DL
CALCIUM SERPL-MCNC: 9.4 MG/DL
CHLORIDE BLD-SCNC: 96 MMOL/L
CO2: 24 MMOL/L
CREAT SERPL-MCNC: 0.98 MG/DL
GFR CALCULATED: 73
GLUCOSE BLD-MCNC: 454 MG/DL
POTASSIUM SERPL-SCNC: 4.8 MMOL/L
SODIUM BLD-SCNC: 132 MMOL/L
TOTAL PROTEIN: 6.5

## 2020-10-14 ENCOUNTER — TELEPHONE (OUTPATIENT)
Dept: RADIATION ONCOLOGY | Age: 80
End: 2020-10-14

## 2020-10-14 NOTE — TELEPHONE ENCOUNTER
Received call from pt's wife Darcy Palafox stating that she had received a call from [address] shortly after orders were sent in which she was asked several questions about the pt's insurance but has not heard anything since. Called Jonathon and was informed that per latest documentation they are still waiting for insurance approval. However this note was from 2 months ago, no one was available in the enteral department at this time. Left message with the enteral department requesting a return call. Called Darcy Palafox back and informed her of this. Will continue to follow.  Electronically signed by Cheyenne Gongora RD, LD on 10/14/2020 at 10:06 AM

## 2020-10-14 NOTE — TELEPHONE ENCOUNTER
Called Ela Salcido at [facility] again to assess the status of pt's oral supplement order. Ela Salcido stated that she checked into this and that the pt's insurance ultimately denied coverage. Called Liu Eason and informed her of this. Discussed that Boost coupons are available at the [facility] should she want more. She reported that she does still have several coupons at home but will stop in for more as needed.  Electronically signed by Kehinde Weiner RD, LD on 10/14/2020 at 1:16 PM

## 2020-12-07 RX ORDER — FLUOXETINE 10 MG/1
CAPSULE ORAL
Qty: 60 CAPSULE | Refills: 0 | Status: SHIPPED
Start: 2020-12-07 | End: 2021-02-08 | Stop reason: SDUPTHER

## 2021-01-01 ENCOUNTER — HOSPITAL ENCOUNTER (OUTPATIENT)
Dept: CT IMAGING | Age: 81
Discharge: HOME | End: 2021-05-21
Payer: MEDICARE

## 2021-01-01 ENCOUNTER — HOSPITAL ENCOUNTER (OUTPATIENT)
Dept: CT IMAGING | Age: 81
Discharge: HOME | End: 2021-10-17
Payer: MEDICARE

## 2021-01-01 DIAGNOSIS — C34.12 CANCER OF BRONCHUS OF LEFT UPPER LOBE (HCC): ICD-10-CM

## 2021-01-01 DIAGNOSIS — C34.12 PRIMARY ADENOCARCINOMA OF UPPER LOBE OF LEFT LUNG (HCC): ICD-10-CM

## 2021-01-01 LAB — DIABETIC RETINOPATHY: NORMAL

## 2021-01-01 PROCEDURE — 71260 CT THORAX DX C+: CPT

## 2021-01-01 PROCEDURE — 2580000003 HC RX 258: Performed by: RADIOLOGY

## 2021-01-01 PROCEDURE — 74177 CT ABD & PELVIS W/CONTRAST: CPT

## 2021-01-01 PROCEDURE — 6360000004 HC RX CONTRAST MEDICATION: Performed by: RADIOLOGY

## 2021-01-01 RX ORDER — FLUOXETINE 10 MG/1
10 CAPSULE ORAL DAILY
Qty: 90 CAPSULE | Refills: 1 | Status: SHIPPED
Start: 2021-01-01 | End: 2022-01-01

## 2021-01-01 RX ORDER — SODIUM CHLORIDE 0.9 % (FLUSH) 0.9 %
5-40 SYRINGE (ML) INJECTION 2 TIMES DAILY
Status: DISCONTINUED | OUTPATIENT
Start: 2021-01-01 | End: 2021-01-01 | Stop reason: HOSPADM

## 2021-01-01 RX ADMIN — IOHEXOL 50 ML: 240 INJECTION, SOLUTION INTRATHECAL; INTRAVASCULAR; INTRAVENOUS; ORAL at 14:30

## 2021-01-01 RX ADMIN — IOPAMIDOL 100 ML: 755 INJECTION, SOLUTION INTRAVENOUS at 10:51

## 2021-01-01 RX ADMIN — Medication 10 ML: at 10:52

## 2021-01-01 RX ADMIN — IOPAMIDOL 75 ML: 755 INJECTION, SOLUTION INTRAVENOUS at 14:30

## 2021-01-01 RX ADMIN — IOHEXOL 50 ML: 240 INJECTION, SOLUTION INTRATHECAL; INTRAVASCULAR; INTRAVENOUS; ORAL at 10:51

## 2021-01-13 ENCOUNTER — HOSPITAL ENCOUNTER (OUTPATIENT)
Dept: CT IMAGING | Age: 81
Discharge: HOME | End: 2021-01-15
Payer: MEDICARE

## 2021-01-13 DIAGNOSIS — C34.12 CANCER OF BRONCHUS OF LEFT UPPER LOBE (HCC): ICD-10-CM

## 2021-01-13 PROCEDURE — 71260 CT THORAX DX C+: CPT

## 2021-01-13 PROCEDURE — 74177 CT ABD & PELVIS W/CONTRAST: CPT

## 2021-01-13 PROCEDURE — 6360000004 HC RX CONTRAST MEDICATION: Performed by: RADIOLOGY

## 2021-01-13 PROCEDURE — 2580000003 HC RX 258: Performed by: RADIOLOGY

## 2021-01-13 RX ORDER — SODIUM CHLORIDE 0.9 % (FLUSH) 0.9 %
10 SYRINGE (ML) INJECTION 2 TIMES DAILY
Status: DISCONTINUED | OUTPATIENT
Start: 2021-01-13 | End: 2021-01-16 | Stop reason: HOSPADM

## 2021-01-13 RX ADMIN — IOHEXOL 50 ML: 240 INJECTION, SOLUTION INTRATHECAL; INTRAVASCULAR; INTRAVENOUS; ORAL at 10:56

## 2021-01-13 RX ADMIN — IOPAMIDOL 100 ML: 755 INJECTION, SOLUTION INTRAVENOUS at 10:56

## 2021-01-13 RX ADMIN — Medication 10 ML: at 10:56

## 2021-02-08 RX ORDER — FLUOXETINE 10 MG/1
CAPSULE ORAL
Qty: 90 CAPSULE | Refills: 1 | Status: SHIPPED
Start: 2021-02-08 | End: 2021-01-01 | Stop reason: SDUPTHER

## 2022-01-01 ENCOUNTER — APPOINTMENT (OUTPATIENT)
Dept: GENERAL RADIOLOGY | Age: 82
DRG: 637 | End: 2022-01-01
Payer: MEDICARE

## 2022-01-01 ENCOUNTER — APPOINTMENT (OUTPATIENT)
Dept: CT IMAGING | Age: 82
DRG: 637 | End: 2022-01-01
Payer: MEDICARE

## 2022-01-01 ENCOUNTER — HOSPITAL ENCOUNTER (INPATIENT)
Age: 82
LOS: 3 days | Discharge: NURSING, REHAB OR LONG-TERM CARE | DRG: 637 | End: 2022-03-17
Attending: EMERGENCY MEDICINE | Admitting: INTERNAL MEDICINE
Payer: MEDICARE

## 2022-01-01 ENCOUNTER — HOSPITAL ENCOUNTER (INPATIENT)
Age: 82
LOS: 3 days | Discharge: HOSPICE | DRG: 193 | End: 2022-04-13
Attending: EMERGENCY MEDICINE | Admitting: INTERNAL MEDICINE
Payer: MEDICARE

## 2022-01-01 ENCOUNTER — APPOINTMENT (OUTPATIENT)
Dept: GENERAL RADIOLOGY | Age: 82
DRG: 193 | End: 2022-01-01
Payer: MEDICARE

## 2022-01-01 ENCOUNTER — HOSPITAL ENCOUNTER (OUTPATIENT)
Age: 82
Discharge: HOME | End: 2022-01-14
Payer: MEDICARE

## 2022-01-01 ENCOUNTER — OFFICE VISIT (OUTPATIENT)
Dept: PRIMARY CARE CLINIC | Age: 82
End: 2022-01-01
Payer: MEDICARE

## 2022-01-01 ENCOUNTER — APPOINTMENT (OUTPATIENT)
Dept: CT IMAGING | Age: 82
DRG: 193 | End: 2022-01-01
Payer: MEDICARE

## 2022-01-01 ENCOUNTER — HOSPITAL ENCOUNTER (OUTPATIENT)
Dept: CT IMAGING | Age: 82
Discharge: HOME | End: 2022-01-23
Payer: MEDICARE

## 2022-01-01 ENCOUNTER — HOSPITAL ENCOUNTER (OUTPATIENT)
Age: 82
End: 2022-04-15
Attending: FAMILY MEDICINE | Admitting: FAMILY MEDICINE

## 2022-01-01 ENCOUNTER — APPOINTMENT (OUTPATIENT)
Dept: ULTRASOUND IMAGING | Age: 82
DRG: 637 | End: 2022-01-01
Payer: MEDICARE

## 2022-01-01 ENCOUNTER — TELEPHONE (OUTPATIENT)
Dept: PRIMARY CARE CLINIC | Age: 82
End: 2022-01-01

## 2022-01-01 ENCOUNTER — HOSPITAL ENCOUNTER (OUTPATIENT)
Dept: GENERAL RADIOLOGY | Age: 82
Discharge: HOME | End: 2022-01-14
Payer: MEDICARE

## 2022-01-01 VITALS
RESPIRATION RATE: 17 BRPM | OXYGEN SATURATION: 92 % | HEIGHT: 70 IN | WEIGHT: 179.23 LBS | HEART RATE: 96 BPM | TEMPERATURE: 98.1 F | DIASTOLIC BLOOD PRESSURE: 92 MMHG | BODY MASS INDEX: 25.66 KG/M2 | SYSTOLIC BLOOD PRESSURE: 161 MMHG

## 2022-01-01 VITALS
RESPIRATION RATE: 18 BRPM | HEIGHT: 70 IN | OXYGEN SATURATION: 93 % | BODY MASS INDEX: 26.83 KG/M2 | TEMPERATURE: 97.3 F | HEART RATE: 112 BPM | DIASTOLIC BLOOD PRESSURE: 76 MMHG | SYSTOLIC BLOOD PRESSURE: 124 MMHG

## 2022-01-01 VITALS
DIASTOLIC BLOOD PRESSURE: 53 MMHG | RESPIRATION RATE: 24 BRPM | HEIGHT: 70 IN | TEMPERATURE: 98.6 F | BODY MASS INDEX: 26.89 KG/M2 | SYSTOLIC BLOOD PRESSURE: 120 MMHG | WEIGHT: 187.83 LBS | HEART RATE: 92 BPM | OXYGEN SATURATION: 97 %

## 2022-01-01 DIAGNOSIS — C34.12 MALIGNANT NEOPLASM OF UPPER LOBE OF LEFT LUNG (HCC): Primary | ICD-10-CM

## 2022-01-01 DIAGNOSIS — F41.9 ANXIETY: ICD-10-CM

## 2022-01-01 DIAGNOSIS — I21.4 NSTEMI (NON-ST ELEVATED MYOCARDIAL INFARCTION) (HCC): ICD-10-CM

## 2022-01-01 DIAGNOSIS — R06.02 SHORTNESS OF BREATH: ICD-10-CM

## 2022-01-01 DIAGNOSIS — Z00.00 MEDICARE ANNUAL WELLNESS VISIT, SUBSEQUENT: Primary | ICD-10-CM

## 2022-01-01 DIAGNOSIS — R06.2 WHEEZE: ICD-10-CM

## 2022-01-01 DIAGNOSIS — C80.1 CANCER (HCC): ICD-10-CM

## 2022-01-01 DIAGNOSIS — J18.9 SEPSIS DUE TO PNEUMONIA (HCC): ICD-10-CM

## 2022-01-01 DIAGNOSIS — C34.90 MALIGNANT NEOPLASM OF LUNG, UNSPECIFIED LATERALITY, UNSPECIFIED PART OF LUNG (HCC): ICD-10-CM

## 2022-01-01 DIAGNOSIS — J18.9 PNEUMONIA OF RIGHT LUNG DUE TO INFECTIOUS ORGANISM, UNSPECIFIED PART OF LUNG: ICD-10-CM

## 2022-01-01 DIAGNOSIS — R60.0 BILATERAL LOWER EXTREMITY EDEMA: ICD-10-CM

## 2022-01-01 DIAGNOSIS — I25.10 CORONARY ARTERY DISEASE INVOLVING NATIVE HEART WITHOUT ANGINA PECTORIS, UNSPECIFIED VESSEL OR LESION TYPE: ICD-10-CM

## 2022-01-01 DIAGNOSIS — R91.8 LUNG MASS: ICD-10-CM

## 2022-01-01 DIAGNOSIS — C34.12 CANCER OF BRONCHUS OF LEFT UPPER LOBE (HCC): ICD-10-CM

## 2022-01-01 DIAGNOSIS — I21.4 NSTEMI (NON-ST ELEVATED MYOCARDIAL INFARCTION) (HCC): Primary | ICD-10-CM

## 2022-01-01 DIAGNOSIS — J96.01 ACUTE RESPIRATORY FAILURE WITH HYPOXIA (HCC): Primary | ICD-10-CM

## 2022-01-01 DIAGNOSIS — J98.11 ATELECTASIS OF LEFT LUNG: ICD-10-CM

## 2022-01-01 DIAGNOSIS — A41.9 SEPSIS DUE TO PNEUMONIA (HCC): ICD-10-CM

## 2022-01-01 LAB
ADENOVIRUS BY PCR: NOT DETECTED
ALBUMIN SERPL-MCNC: 2.1 G/DL (ref 3.5–5.2)
ALBUMIN SERPL-MCNC: 2.2 G/DL (ref 3.5–5.2)
ALBUMIN SERPL-MCNC: 2.2 G/DL (ref 3.5–5.2)
ALBUMIN SERPL-MCNC: 2.3 G/DL (ref 3.5–5.2)
ALBUMIN SERPL-MCNC: 2.4 G/DL (ref 3.5–5.2)
ALBUMIN SERPL-MCNC: 2.5 G/DL (ref 3.5–5.2)
ALBUMIN SERPL-MCNC: 2.7 G/DL (ref 3.5–5.2)
ALBUMIN SERPL-MCNC: 2.8 G/DL (ref 3.5–5.2)
ALP BLD-CCNC: 64 U/L (ref 40–129)
ALP BLD-CCNC: 65 U/L (ref 40–129)
ALP BLD-CCNC: 67 U/L (ref 40–129)
ALP BLD-CCNC: 69 U/L (ref 40–129)
ALP BLD-CCNC: 69 U/L (ref 40–129)
ALP BLD-CCNC: 71 U/L (ref 40–129)
ALP BLD-CCNC: 74 U/L (ref 40–129)
ALP BLD-CCNC: 76 U/L (ref 40–129)
ALT SERPL-CCNC: 15 U/L (ref 0–40)
ALT SERPL-CCNC: 19 U/L (ref 0–40)
ALT SERPL-CCNC: 21 U/L (ref 0–40)
ALT SERPL-CCNC: 21 U/L (ref 0–40)
ALT SERPL-CCNC: 22 U/L (ref 0–40)
ALT SERPL-CCNC: 27 U/L (ref 0–40)
ALT SERPL-CCNC: 36 U/L (ref 0–40)
ALT SERPL-CCNC: 8 U/L (ref 0–40)
ANION GAP SERPL CALCULATED.3IONS-SCNC: 6 MMOL/L (ref 7–16)
ANION GAP SERPL CALCULATED.3IONS-SCNC: 7 MMOL/L (ref 7–16)
ANION GAP SERPL CALCULATED.3IONS-SCNC: 8 MMOL/L (ref 7–16)
ANION GAP SERPL CALCULATED.3IONS-SCNC: 9 MMOL/L (ref 7–16)
ANION GAP SERPL CALCULATED.3IONS-SCNC: 9 MMOL/L (ref 7–16)
APTT: 26.5 SEC (ref 24.5–35.1)
AST SERPL-CCNC: 18 U/L (ref 0–39)
AST SERPL-CCNC: 18 U/L (ref 0–39)
AST SERPL-CCNC: 23 U/L (ref 0–39)
AST SERPL-CCNC: 44 U/L (ref 0–39)
AST SERPL-CCNC: 45 U/L (ref 0–39)
AST SERPL-CCNC: 47 U/L (ref 0–39)
AST SERPL-CCNC: 62 U/L (ref 0–39)
AST SERPL-CCNC: 69 U/L (ref 0–39)
B.E.: 10.3 MMOL/L (ref -3–3)
BASOPHILS ABSOLUTE: 0.02 E9/L (ref 0–0.2)
BASOPHILS ABSOLUTE: 0.02 E9/L (ref 0–0.2)
BASOPHILS ABSOLUTE: 0.04 E9/L (ref 0–0.2)
BASOPHILS ABSOLUTE: 0.04 E9/L (ref 0–0.2)
BASOPHILS ABSOLUTE: 0.05 E9/L (ref 0–0.2)
BASOPHILS ABSOLUTE: 0.06 E9/L (ref 0–0.2)
BASOPHILS RELATIVE PERCENT: 0.2 % (ref 0–2)
BASOPHILS RELATIVE PERCENT: 0.3 % (ref 0–2)
BILIRUB SERPL-MCNC: 0.2 MG/DL (ref 0–1.2)
BILIRUB SERPL-MCNC: 0.2 MG/DL (ref 0–1.2)
BILIRUB SERPL-MCNC: 0.3 MG/DL (ref 0–1.2)
BILIRUB SERPL-MCNC: <0.2 MG/DL (ref 0–1.2)
BILIRUB SERPL-MCNC: <0.2 MG/DL (ref 0–1.2)
BLOOD CULTURE, ROUTINE: NORMAL
BLOOD CULTURE, ROUTINE: NORMAL
BORDETELLA PARAPERTUSSIS BY PCR: NOT DETECTED
BORDETELLA PERTUSSIS BY PCR: NOT DETECTED
BUN BLDV-MCNC: 12 MG/DL (ref 6–23)
BUN BLDV-MCNC: 14 MG/DL (ref 6–23)
BUN BLDV-MCNC: 15 MG/DL (ref 6–23)
BUN BLDV-MCNC: 17 MG/DL (ref 6–23)
BUN BLDV-MCNC: 18 MG/DL (ref 6–23)
BUN BLDV-MCNC: 20 MG/DL (ref 6–23)
BUN BLDV-MCNC: 23 MG/DL (ref 6–23)
BUN BLDV-MCNC: 23 MG/DL (ref 6–23)
BUN BLDV-MCNC: 30 MG/DL (ref 6–23)
CALCIUM SERPL-MCNC: 8.1 MG/DL (ref 8.6–10.2)
CALCIUM SERPL-MCNC: 8.2 MG/DL (ref 8.6–10.2)
CALCIUM SERPL-MCNC: 8.2 MG/DL (ref 8.6–10.2)
CALCIUM SERPL-MCNC: 8.3 MG/DL (ref 8.6–10.2)
CALCIUM SERPL-MCNC: 8.5 MG/DL (ref 8.6–10.2)
CALCIUM SERPL-MCNC: 8.6 MG/DL (ref 8.6–10.2)
CALCIUM SERPL-MCNC: 8.8 MG/DL (ref 8.6–10.2)
CHLAMYDOPHILIA PNEUMONIAE BY PCR: NOT DETECTED
CHLORIDE BLD-SCNC: 100 MMOL/L (ref 98–107)
CHLORIDE BLD-SCNC: 100 MMOL/L (ref 98–107)
CHLORIDE BLD-SCNC: 105 MMOL/L (ref 98–107)
CHLORIDE BLD-SCNC: 89 MMOL/L (ref 98–107)
CHLORIDE BLD-SCNC: 92 MMOL/L (ref 98–107)
CHLORIDE BLD-SCNC: 94 MMOL/L (ref 98–107)
CHLORIDE BLD-SCNC: 96 MMOL/L (ref 98–107)
CHLORIDE BLD-SCNC: 97 MMOL/L (ref 98–107)
CHLORIDE BLD-SCNC: 98 MMOL/L (ref 98–107)
CHP ED QC CHECK: NORMAL
CHP ED QC CHECK: NORMAL
CO2: 28 MMOL/L (ref 22–29)
CO2: 31 MMOL/L (ref 22–29)
CO2: 32 MMOL/L (ref 22–29)
CO2: 33 MMOL/L (ref 22–29)
CO2: 34 MMOL/L (ref 22–29)
CO2: 37 MMOL/L (ref 22–29)
CO2: 39 MMOL/L (ref 22–29)
COHB: 0.9 % (ref 0–1.5)
CORONAVIRUS 229E BY PCR: NOT DETECTED
CORONAVIRUS HKU1 BY PCR: NOT DETECTED
CORONAVIRUS NL63 BY PCR: NOT DETECTED
CORONAVIRUS OC43 BY PCR: NOT DETECTED
CORTISOL TOTAL: 22.59 MCG/DL (ref 2.68–18.4)
CREAT SERPL-MCNC: 0.5 MG/DL (ref 0.7–1.2)
CREAT SERPL-MCNC: 0.5 MG/DL (ref 0.7–1.2)
CREAT SERPL-MCNC: 0.7 MG/DL (ref 0.7–1.2)
CREAT SERPL-MCNC: 0.8 MG/DL (ref 0.7–1.2)
CREAT SERPL-MCNC: 0.8 MG/DL (ref 0.7–1.2)
CREAT SERPL-MCNC: 0.9 MG/DL (ref 0.7–1.2)
CREAT SERPL-MCNC: 1 MG/DL (ref 0.7–1.2)
CRITICAL: ABNORMAL
CULTURE, BLOOD 2: NORMAL
CULTURE, BLOOD 2: NORMAL
DATE ANALYZED: ABNORMAL
DATE OF COLLECTION: ABNORMAL
EKG ATRIAL RATE: 104 BPM
EKG ATRIAL RATE: 85 BPM
EKG P AXIS: 31 DEGREES
EKG P AXIS: 42 DEGREES
EKG P-R INTERVAL: 132 MS
EKG P-R INTERVAL: 140 MS
EKG Q-T INTERVAL: 336 MS
EKG Q-T INTERVAL: 366 MS
EKG QRS DURATION: 80 MS
EKG QRS DURATION: 86 MS
EKG QTC CALCULATION (BAZETT): 435 MS
EKG QTC CALCULATION (BAZETT): 441 MS
EKG R AXIS: 27 DEGREES
EKG R AXIS: 54 DEGREES
EKG T AXIS: 55 DEGREES
EKG T AXIS: 81 DEGREES
EKG VENTRICULAR RATE: 104 BPM
EKG VENTRICULAR RATE: 85 BPM
EOSINOPHILS ABSOLUTE: 0.01 E9/L (ref 0.05–0.5)
EOSINOPHILS ABSOLUTE: 0.02 E9/L (ref 0.05–0.5)
EOSINOPHILS ABSOLUTE: 0.05 E9/L (ref 0.05–0.5)
EOSINOPHILS ABSOLUTE: 0.05 E9/L (ref 0.05–0.5)
EOSINOPHILS ABSOLUTE: 0.09 E9/L (ref 0.05–0.5)
EOSINOPHILS ABSOLUTE: 0.12 E9/L (ref 0.05–0.5)
EOSINOPHILS RELATIVE PERCENT: 0.1 % (ref 0–6)
EOSINOPHILS RELATIVE PERCENT: 0.3 % (ref 0–6)
EOSINOPHILS RELATIVE PERCENT: 0.4 % (ref 0–6)
EOSINOPHILS RELATIVE PERCENT: 0.6 % (ref 0–6)
GFR AFRICAN AMERICAN: >60
GFR NON-AFRICAN AMERICAN: >60 ML/MIN/1.73
GLUCOSE BLD-MCNC: 104 MG/DL
GLUCOSE BLD-MCNC: 110 MG/DL (ref 74–99)
GLUCOSE BLD-MCNC: 120 MG/DL
GLUCOSE BLD-MCNC: 128 MG/DL (ref 74–99)
GLUCOSE BLD-MCNC: 135 MG/DL (ref 74–99)
GLUCOSE BLD-MCNC: 165 MG/DL (ref 74–99)
GLUCOSE BLD-MCNC: 173 MG/DL (ref 74–99)
GLUCOSE BLD-MCNC: 208 MG/DL (ref 74–99)
GLUCOSE BLD-MCNC: 278 MG/DL (ref 74–99)
GLUCOSE BLD-MCNC: 56 MG/DL (ref 74–99)
GLUCOSE BLD-MCNC: 96 MG/DL (ref 74–99)
HCO3: 35.3 MMOL/L (ref 22–26)
HCT VFR BLD CALC: 27.4 % (ref 37–54)
HCT VFR BLD CALC: 27.8 % (ref 37–54)
HCT VFR BLD CALC: 28.7 % (ref 37–54)
HCT VFR BLD CALC: 28.8 % (ref 37–54)
HCT VFR BLD CALC: 28.9 % (ref 37–54)
HCT VFR BLD CALC: 29.9 % (ref 37–54)
HCT VFR BLD CALC: 30 % (ref 37–54)
HCT VFR BLD CALC: 30.6 % (ref 37–54)
HEMOGLOBIN: 8.4 G/DL (ref 12.5–16.5)
HEMOGLOBIN: 8.6 G/DL (ref 12.5–16.5)
HEMOGLOBIN: 8.8 G/DL (ref 12.5–16.5)
HEMOGLOBIN: 8.9 G/DL (ref 12.5–16.5)
HEMOGLOBIN: 9.2 G/DL (ref 12.5–16.5)
HEMOGLOBIN: 9.5 G/DL (ref 12.5–16.5)
HHB: 4.9 % (ref 0–5)
HUMAN METAPNEUMOVIRUS BY PCR: NOT DETECTED
HUMAN RHINOVIRUS/ENTEROVIRUS BY PCR: NOT DETECTED
IMMATURE GRANULOCYTES #: 0.06 E9/L
IMMATURE GRANULOCYTES #: 0.07 E9/L
IMMATURE GRANULOCYTES #: 0.18 E9/L
IMMATURE GRANULOCYTES #: 0.19 E9/L
IMMATURE GRANULOCYTES #: 0.2 E9/L
IMMATURE GRANULOCYTES #: 0.22 E9/L
IMMATURE GRANULOCYTES %: 0.9 % (ref 0–5)
IMMATURE GRANULOCYTES %: 1 % (ref 0–5)
IMMATURE GRANULOCYTES %: 1.2 % (ref 0–5)
IMMATURE GRANULOCYTES %: 1.3 % (ref 0–5)
INFLUENZA A BY PCR: NOT DETECTED
INFLUENZA B BY PCR: NOT DETECTED
INR BLD: 1.4
IRON SATURATION: 18 % (ref 20–55)
IRON: 30 MCG/DL (ref 59–158)
LAB: ABNORMAL
LACTIC ACID, SEPSIS: 0.8 MMOL/L (ref 0.5–1.9)
LACTIC ACID, SEPSIS: 1.5 MMOL/L (ref 0.5–1.9)
LIPASE: 13 U/L (ref 13–60)
LIPASE: 15 U/L (ref 13–60)
LYMPHOCYTES ABSOLUTE: 0.61 E9/L (ref 1.5–4)
LYMPHOCYTES ABSOLUTE: 0.64 E9/L (ref 1.5–4)
LYMPHOCYTES ABSOLUTE: 0.67 E9/L (ref 1.5–4)
LYMPHOCYTES ABSOLUTE: 0.69 E9/L (ref 1.5–4)
LYMPHOCYTES ABSOLUTE: 0.76 E9/L (ref 1.5–4)
LYMPHOCYTES ABSOLUTE: 0.88 E9/L (ref 1.5–4)
LYMPHOCYTES RELATIVE PERCENT: 10 % (ref 20–42)
LYMPHOCYTES RELATIVE PERCENT: 2.9 % (ref 20–42)
LYMPHOCYTES RELATIVE PERCENT: 4.2 % (ref 20–42)
LYMPHOCYTES RELATIVE PERCENT: 4.3 % (ref 20–42)
LYMPHOCYTES RELATIVE PERCENT: 4.3 % (ref 20–42)
LYMPHOCYTES RELATIVE PERCENT: 9.9 % (ref 20–42)
Lab: ABNORMAL
MAGNESIUM: 2 MG/DL (ref 1.6–2.6)
MAGNESIUM: 2.1 MG/DL (ref 1.6–2.6)
MAGNESIUM: 2.1 MG/DL (ref 1.6–2.6)
MAGNESIUM: 2.2 MG/DL (ref 1.6–2.6)
MAGNESIUM: 2.2 MG/DL (ref 1.6–2.6)
MCH RBC QN AUTO: 26.9 PG (ref 26–35)
MCH RBC QN AUTO: 27.6 PG (ref 26–35)
MCH RBC QN AUTO: 27.6 PG (ref 26–35)
MCH RBC QN AUTO: 27.8 PG (ref 26–35)
MCH RBC QN AUTO: 28.1 PG (ref 26–35)
MCH RBC QN AUTO: 28.4 PG (ref 26–35)
MCH RBC QN AUTO: 28.6 PG (ref 26–35)
MCH RBC QN AUTO: 28.8 PG (ref 26–35)
MCHC RBC AUTO-ENTMCNC: 29.8 % (ref 32–34.5)
MCHC RBC AUTO-ENTMCNC: 29.8 % (ref 32–34.5)
MCHC RBC AUTO-ENTMCNC: 30 % (ref 32–34.5)
MCHC RBC AUTO-ENTMCNC: 30.6 % (ref 32–34.5)
MCHC RBC AUTO-ENTMCNC: 30.7 % (ref 32–34.5)
MCHC RBC AUTO-ENTMCNC: 30.7 % (ref 32–34.5)
MCHC RBC AUTO-ENTMCNC: 30.9 % (ref 32–34.5)
MCHC RBC AUTO-ENTMCNC: 31 % (ref 32–34.5)
MCV RBC AUTO: 89.7 FL (ref 80–99.9)
MCV RBC AUTO: 90 FL (ref 80–99.9)
MCV RBC AUTO: 90.1 FL (ref 80–99.9)
MCV RBC AUTO: 90.3 FL (ref 80–99.9)
MCV RBC AUTO: 91.6 FL (ref 80–99.9)
MCV RBC AUTO: 92.2 FL (ref 80–99.9)
MCV RBC AUTO: 95.4 FL (ref 80–99.9)
MCV RBC AUTO: 96.8 FL (ref 80–99.9)
METER GLUCOSE: 107 MG/DL (ref 74–99)
METER GLUCOSE: 120 MG/DL (ref 74–99)
METER GLUCOSE: 126 MG/DL (ref 74–99)
METER GLUCOSE: 130 MG/DL (ref 74–99)
METER GLUCOSE: 147 MG/DL (ref 74–99)
METER GLUCOSE: 166 MG/DL (ref 74–99)
METER GLUCOSE: 174 MG/DL (ref 74–99)
METER GLUCOSE: 178 MG/DL (ref 74–99)
METER GLUCOSE: 182 MG/DL (ref 74–99)
METER GLUCOSE: 190 MG/DL (ref 74–99)
METER GLUCOSE: 194 MG/DL (ref 74–99)
METER GLUCOSE: 201 MG/DL (ref 74–99)
METER GLUCOSE: 204 MG/DL (ref 74–99)
METER GLUCOSE: 209 MG/DL (ref 74–99)
METER GLUCOSE: 212 MG/DL (ref 74–99)
METER GLUCOSE: 228 MG/DL (ref 74–99)
METER GLUCOSE: 238 MG/DL (ref 74–99)
METER GLUCOSE: 251 MG/DL (ref 74–99)
METER GLUCOSE: 262 MG/DL (ref 74–99)
METER GLUCOSE: 264 MG/DL (ref 74–99)
METER GLUCOSE: 309 MG/DL (ref 74–99)
METER GLUCOSE: 335 MG/DL (ref 74–99)
METER GLUCOSE: 351 MG/DL (ref 74–99)
METER GLUCOSE: 352 MG/DL (ref 74–99)
METER GLUCOSE: 443 MG/DL (ref 74–99)
METER GLUCOSE: 444 MG/DL (ref 74–99)
METER GLUCOSE: 449 MG/DL (ref 74–99)
METER GLUCOSE: 460 MG/DL (ref 74–99)
METER GLUCOSE: 69 MG/DL (ref 74–99)
METER GLUCOSE: 89 MG/DL (ref 74–99)
METHB: 0.3 % (ref 0–1.5)
MODE: ABNORMAL
MONOCYTES ABSOLUTE: 0.24 E9/L (ref 0.1–0.95)
MONOCYTES ABSOLUTE: 0.48 E9/L (ref 0.1–0.95)
MONOCYTES ABSOLUTE: 1.14 E9/L (ref 0.1–0.95)
MONOCYTES ABSOLUTE: 1.25 E9/L (ref 0.1–0.95)
MONOCYTES ABSOLUTE: 1.26 E9/L (ref 0.1–0.95)
MONOCYTES ABSOLUTE: 1.41 E9/L (ref 0.1–0.95)
MONOCYTES RELATIVE PERCENT: 3.2 % (ref 2–12)
MONOCYTES RELATIVE PERCENT: 6.1 % (ref 2–12)
MONOCYTES RELATIVE PERCENT: 6.6 % (ref 2–12)
MONOCYTES RELATIVE PERCENT: 6.9 % (ref 2–12)
MONOCYTES RELATIVE PERCENT: 7.4 % (ref 2–12)
MONOCYTES RELATIVE PERCENT: 8.2 % (ref 2–12)
MYCOPLASMA PNEUMONIAE BY PCR: NOT DETECTED
NEUTROPHILS ABSOLUTE: 13.14 E9/L (ref 1.8–7.3)
NEUTROPHILS ABSOLUTE: 13.31 E9/L (ref 1.8–7.3)
NEUTROPHILS ABSOLUTE: 17.84 E9/L (ref 1.8–7.3)
NEUTROPHILS ABSOLUTE: 18.9 E9/L (ref 1.8–7.3)
NEUTROPHILS ABSOLUTE: 5.7 E9/L (ref 1.8–7.3)
NEUTROPHILS ABSOLUTE: 6.5 E9/L (ref 1.8–7.3)
NEUTROPHILS RELATIVE PERCENT: 81.9 % (ref 43–80)
NEUTROPHILS RELATIVE PERCENT: 85.3 % (ref 43–80)
NEUTROPHILS RELATIVE PERCENT: 85.8 % (ref 43–80)
NEUTROPHILS RELATIVE PERCENT: 86.4 % (ref 43–80)
NEUTROPHILS RELATIVE PERCENT: 87.8 % (ref 43–80)
NEUTROPHILS RELATIVE PERCENT: 88.9 % (ref 43–80)
O2 CONTENT: 13.3 ML/DL
O2 SATURATION: 95 % (ref 92–98.5)
O2HB: 93.9 % (ref 94–97)
OPERATOR ID: ABNORMAL
PARAINFLUENZA VIRUS 1 BY PCR: NOT DETECTED
PARAINFLUENZA VIRUS 2 BY PCR: NOT DETECTED
PARAINFLUENZA VIRUS 3 BY PCR: NOT DETECTED
PARAINFLUENZA VIRUS 4 BY PCR: NOT DETECTED
PATIENT TEMP: 37 C
PCO2: 49.8 MMHG (ref 35–45)
PDW BLD-RTO: 19.5 FL (ref 11.5–15)
PDW BLD-RTO: 19.6 FL (ref 11.5–15)
PDW BLD-RTO: 19.7 FL (ref 11.5–15)
PDW BLD-RTO: 19.8 FL (ref 11.5–15)
PDW BLD-RTO: 19.9 FL (ref 11.5–15)
PDW BLD-RTO: 20 FL (ref 11.5–15)
PH BLOOD GAS: 7.47 (ref 7.35–7.45)
PHOSPHORUS: 1.9 MG/DL (ref 2.5–4.5)
PHOSPHORUS: 2.3 MG/DL (ref 2.5–4.5)
PLATELET # BLD: 349 E9/L (ref 130–450)
PLATELET # BLD: 370 E9/L (ref 130–450)
PLATELET # BLD: 380 E9/L (ref 130–450)
PLATELET # BLD: 391 E9/L (ref 130–450)
PLATELET # BLD: 446 E9/L (ref 130–450)
PLATELET # BLD: 453 E9/L (ref 130–450)
PLATELET # BLD: 455 E9/L (ref 130–450)
PLATELET # BLD: 461 E9/L (ref 130–450)
PMV BLD AUTO: 10 FL (ref 7–12)
PMV BLD AUTO: 10 FL (ref 7–12)
PMV BLD AUTO: 10.1 FL (ref 7–12)
PMV BLD AUTO: 9.4 FL (ref 7–12)
PMV BLD AUTO: 9.6 FL (ref 7–12)
PMV BLD AUTO: 9.7 FL (ref 7–12)
PMV BLD AUTO: 9.7 FL (ref 7–12)
PMV BLD AUTO: 9.8 FL (ref 7–12)
PO2: 80.3 MMHG (ref 75–100)
POTASSIUM REFLEX MAGNESIUM: 3.4 MMOL/L (ref 3.5–5)
POTASSIUM REFLEX MAGNESIUM: 3.4 MMOL/L (ref 3.5–5)
POTASSIUM REFLEX MAGNESIUM: 3.5 MMOL/L (ref 3.5–5)
POTASSIUM REFLEX MAGNESIUM: 3.7 MMOL/L (ref 3.5–5)
POTASSIUM REFLEX MAGNESIUM: 4.4 MMOL/L (ref 3.5–5)
POTASSIUM REFLEX MAGNESIUM: 4.4 MMOL/L (ref 3.5–5)
POTASSIUM REFLEX MAGNESIUM: 4.7 MMOL/L (ref 3.5–5)
POTASSIUM SERPL-SCNC: 3.8 MMOL/L (ref 3.5–5)
POTASSIUM SERPL-SCNC: 4 MMOL/L (ref 3.5–5)
PRO-BNP: 354 PG/ML (ref 0–450)
PRO-BNP: 584 PG/ML (ref 0–450)
PROCALCITONIN: 0.19 NG/ML (ref 0–0.08)
PROCALCITONIN: 0.21 NG/ML (ref 0–0.08)
PROCALCITONIN: 0.24 NG/ML (ref 0–0.08)
PROTHROMBIN TIME: 15.3 SEC (ref 9.3–12.4)
RBC # BLD: 2.99 E12/L (ref 3.8–5.8)
RBC # BLD: 3.03 E12/L (ref 3.8–5.8)
RBC # BLD: 3.09 E12/L (ref 3.8–5.8)
RBC # BLD: 3.09 E12/L (ref 3.8–5.8)
RBC # BLD: 3.19 E12/L (ref 3.8–5.8)
RBC # BLD: 3.2 E12/L (ref 3.8–5.8)
RBC # BLD: 3.32 E12/L (ref 3.8–5.8)
RBC # BLD: 3.33 E12/L (ref 3.8–5.8)
REASON FOR REJECTION: NORMAL
REJECTED TEST: NORMAL
RESPIRATORY SYNCYTIAL VIRUS BY PCR: NOT DETECTED
SARS-COV-2, NAAT: NOT DETECTED
SARS-COV-2, PCR: NOT DETECTED
SODIUM BLD-SCNC: 135 MMOL/L (ref 132–146)
SODIUM BLD-SCNC: 136 MMOL/L (ref 132–146)
SODIUM BLD-SCNC: 137 MMOL/L (ref 132–146)
SODIUM BLD-SCNC: 138 MMOL/L (ref 132–146)
SODIUM BLD-SCNC: 139 MMOL/L (ref 132–146)
SODIUM BLD-SCNC: 142 MMOL/L (ref 132–146)
SOURCE, BLOOD GAS: ABNORMAL
THB: 10 G/DL (ref 11.5–16.5)
TIME ANALYZED: 2231
TOTAL CK: 357 U/L (ref 20–200)
TOTAL CK: 48 U/L (ref 20–200)
TOTAL IRON BINDING CAPACITY: 164 MCG/DL (ref 250–450)
TOTAL PROTEIN: 4.6 G/DL (ref 6.4–8.3)
TOTAL PROTEIN: 5.2 G/DL (ref 6.4–8.3)
TOTAL PROTEIN: 5.5 G/DL (ref 6.4–8.3)
TOTAL PROTEIN: 5.6 G/DL (ref 6.4–8.3)
TOTAL PROTEIN: 5.7 G/DL (ref 6.4–8.3)
TOTAL PROTEIN: 5.7 G/DL (ref 6.4–8.3)
TOTAL PROTEIN: 6 G/DL (ref 6.4–8.3)
TOTAL PROTEIN: 6.3 G/DL (ref 6.4–8.3)
TROPONIN, HIGH SENSITIVITY: 261 NG/L (ref 0–11)
TROPONIN, HIGH SENSITIVITY: 270 NG/L (ref 0–11)
TROPONIN, HIGH SENSITIVITY: 279 NG/L (ref 0–11)
TROPONIN, HIGH SENSITIVITY: 297 NG/L (ref 0–11)
TROPONIN, HIGH SENSITIVITY: 366 NG/L (ref 0–11)
WBC # BLD: 10.7 E9/L (ref 4.5–11.5)
WBC # BLD: 10.8 E9/L (ref 4.5–11.5)
WBC # BLD: 15.3 E9/L (ref 4.5–11.5)
WBC # BLD: 15.4 E9/L (ref 4.5–11.5)
WBC # BLD: 20.3 E9/L (ref 4.5–11.5)
WBC # BLD: 21.3 E9/L (ref 4.5–11.5)
WBC # BLD: 7 E9/L (ref 4.5–11.5)
WBC # BLD: 7.6 E9/L (ref 4.5–11.5)

## 2022-01-01 PROCEDURE — APPSS45 APP SPLIT SHARED TIME 31-45 MINUTES: Performed by: NURSE PRACTITIONER

## 2022-01-01 PROCEDURE — 6360000002 HC RX W HCPCS: Performed by: INTERNAL MEDICINE

## 2022-01-01 PROCEDURE — 82962 GLUCOSE BLOOD TEST: CPT

## 2022-01-01 PROCEDURE — 6370000000 HC RX 637 (ALT 250 FOR IP): Performed by: PHYSICIAN ASSISTANT

## 2022-01-01 PROCEDURE — 2580000003 HC RX 258: Performed by: INTERNAL MEDICINE

## 2022-01-01 PROCEDURE — 99239 HOSP IP/OBS DSCHRG MGMT >30: CPT | Performed by: INTERNAL MEDICINE

## 2022-01-01 PROCEDURE — 94640 AIRWAY INHALATION TREATMENT: CPT

## 2022-01-01 PROCEDURE — 83605 ASSAY OF LACTIC ACID: CPT

## 2022-01-01 PROCEDURE — 80053 COMPREHEN METABOLIC PANEL: CPT

## 2022-01-01 PROCEDURE — 6370000000 HC RX 637 (ALT 250 FOR IP): Performed by: INTERNAL MEDICINE

## 2022-01-01 PROCEDURE — 99221 1ST HOSP IP/OBS SF/LOW 40: CPT | Performed by: FAMILY MEDICINE

## 2022-01-01 PROCEDURE — 99223 1ST HOSP IP/OBS HIGH 75: CPT | Performed by: INTERNAL MEDICINE

## 2022-01-01 PROCEDURE — 6360000002 HC RX W HCPCS: Performed by: NURSE PRACTITIONER

## 2022-01-01 PROCEDURE — 6360000002 HC RX W HCPCS: Performed by: PHYSICIAN ASSISTANT

## 2022-01-01 PROCEDURE — 82533 TOTAL CORTISOL: CPT

## 2022-01-01 PROCEDURE — 84484 ASSAY OF TROPONIN QUANT: CPT

## 2022-01-01 PROCEDURE — 36415 COLL VENOUS BLD VENIPUNCTURE: CPT

## 2022-01-01 PROCEDURE — APPSS30 APP SPLIT SHARED TIME 16-30 MINUTES: Performed by: NURSE PRACTITIONER

## 2022-01-01 PROCEDURE — 74177 CT ABD & PELVIS W/CONTRAST: CPT

## 2022-01-01 PROCEDURE — 6370000000 HC RX 637 (ALT 250 FOR IP): Performed by: NURSE PRACTITIONER

## 2022-01-01 PROCEDURE — 4040F PNEUMOC VAC/ADMIN/RCVD: CPT | Performed by: FAMILY MEDICINE

## 2022-01-01 PROCEDURE — 99231 SBSQ HOSP IP/OBS SF/LOW 25: CPT | Performed by: NURSE PRACTITIONER

## 2022-01-01 PROCEDURE — 84145 PROCALCITONIN (PCT): CPT

## 2022-01-01 PROCEDURE — 84100 ASSAY OF PHOSPHORUS: CPT

## 2022-01-01 PROCEDURE — 85025 COMPLETE CBC W/AUTO DIFF WBC: CPT

## 2022-01-01 PROCEDURE — 2580000003 HC RX 258: Performed by: PHYSICIAN ASSISTANT

## 2022-01-01 PROCEDURE — 6360000002 HC RX W HCPCS: Performed by: STUDENT IN AN ORGANIZED HEALTH CARE EDUCATION/TRAINING PROGRAM

## 2022-01-01 PROCEDURE — 93010 ELECTROCARDIOGRAM REPORT: CPT | Performed by: INTERNAL MEDICINE

## 2022-01-01 PROCEDURE — 71045 X-RAY EXAM CHEST 1 VIEW: CPT

## 2022-01-01 PROCEDURE — 1123F ACP DISCUSS/DSCN MKR DOCD: CPT | Performed by: FAMILY MEDICINE

## 2022-01-01 PROCEDURE — 99233 SBSQ HOSP IP/OBS HIGH 50: CPT | Performed by: INTERNAL MEDICINE

## 2022-01-01 PROCEDURE — 99232 SBSQ HOSP IP/OBS MODERATE 35: CPT | Performed by: INTERNAL MEDICINE

## 2022-01-01 PROCEDURE — 93970 EXTREMITY STUDY: CPT

## 2022-01-01 PROCEDURE — 97530 THERAPEUTIC ACTIVITIES: CPT

## 2022-01-01 PROCEDURE — 96374 THER/PROPH/DIAG INJ IV PUSH: CPT

## 2022-01-01 PROCEDURE — 83690 ASSAY OF LIPASE: CPT

## 2022-01-01 PROCEDURE — G8484 FLU IMMUNIZE NO ADMIN: HCPCS | Performed by: FAMILY MEDICINE

## 2022-01-01 PROCEDURE — 2580000003 HC RX 258: Performed by: NURSE PRACTITIONER

## 2022-01-01 PROCEDURE — 83735 ASSAY OF MAGNESIUM: CPT

## 2022-01-01 PROCEDURE — 97161 PT EVAL LOW COMPLEX 20 MIN: CPT

## 2022-01-01 PROCEDURE — 85610 PROTHROMBIN TIME: CPT

## 2022-01-01 PROCEDURE — 6370000000 HC RX 637 (ALT 250 FOR IP): Performed by: FAMILY MEDICINE

## 2022-01-01 PROCEDURE — APPSS30 APP SPLIT SHARED TIME 16-30 MINUTES: Performed by: PHYSICIAN ASSISTANT

## 2022-01-01 PROCEDURE — 2060000000 HC ICU INTERMEDIATE R&B

## 2022-01-01 PROCEDURE — 6360000004 HC RX CONTRAST MEDICATION: Performed by: RADIOLOGY

## 2022-01-01 PROCEDURE — 97165 OT EVAL LOW COMPLEX 30 MIN: CPT | Performed by: OCCUPATIONAL THERAPIST

## 2022-01-01 PROCEDURE — 93005 ELECTROCARDIOGRAM TRACING: CPT | Performed by: STUDENT IN AN ORGANIZED HEALTH CARE EDUCATION/TRAINING PROGRAM

## 2022-01-01 PROCEDURE — 71046 X-RAY EXAM CHEST 2 VIEWS: CPT

## 2022-01-01 PROCEDURE — 2500000003 HC RX 250 WO HCPCS: Performed by: NURSE PRACTITIONER

## 2022-01-01 PROCEDURE — 71275 CT ANGIOGRAPHY CHEST: CPT

## 2022-01-01 PROCEDURE — 2580000003 HC RX 258: Performed by: STUDENT IN AN ORGANIZED HEALTH CARE EDUCATION/TRAINING PROGRAM

## 2022-01-01 PROCEDURE — 83880 ASSAY OF NATRIURETIC PEPTIDE: CPT

## 2022-01-01 PROCEDURE — 97535 SELF CARE MNGMENT TRAINING: CPT

## 2022-01-01 PROCEDURE — 2580000003 HC RX 258: Performed by: RADIOLOGY

## 2022-01-01 PROCEDURE — G0439 PPPS, SUBSEQ VISIT: HCPCS | Performed by: FAMILY MEDICINE

## 2022-01-01 PROCEDURE — 85730 THROMBOPLASTIN TIME PARTIAL: CPT

## 2022-01-01 PROCEDURE — 99222 1ST HOSP IP/OBS MODERATE 55: CPT | Performed by: NURSE PRACTITIONER

## 2022-01-01 PROCEDURE — 85027 COMPLETE CBC AUTOMATED: CPT

## 2022-01-01 PROCEDURE — 87635 SARS-COV-2 COVID-19 AMP PRB: CPT

## 2022-01-01 PROCEDURE — 80048 BASIC METABOLIC PNL TOTAL CA: CPT

## 2022-01-01 PROCEDURE — 2500000003 HC RX 250 WO HCPCS: Performed by: INTERNAL MEDICINE

## 2022-01-01 PROCEDURE — 83550 IRON BINDING TEST: CPT

## 2022-01-01 PROCEDURE — 6370000000 HC RX 637 (ALT 250 FOR IP): Performed by: STUDENT IN AN ORGANIZED HEALTH CARE EDUCATION/TRAINING PROGRAM

## 2022-01-01 PROCEDURE — 83540 ASSAY OF IRON: CPT

## 2022-01-01 PROCEDURE — APPSS180 APP SPLIT SHARED TIME > 60 MINUTES: Performed by: NURSE PRACTITIONER

## 2022-01-01 PROCEDURE — 2700000000 HC OXYGEN THERAPY PER DAY

## 2022-01-01 PROCEDURE — 87040 BLOOD CULTURE FOR BACTERIA: CPT

## 2022-01-01 PROCEDURE — 82805 BLOOD GASES W/O2 SATURATION: CPT

## 2022-01-01 PROCEDURE — 82550 ASSAY OF CK (CPK): CPT

## 2022-01-01 PROCEDURE — 94664 DEMO&/EVAL PT USE INHALER: CPT

## 2022-01-01 PROCEDURE — 0202U NFCT DS 22 TRGT SARS-COV-2: CPT

## 2022-01-01 PROCEDURE — 99285 EMERGENCY DEPT VISIT HI MDM: CPT

## 2022-01-01 PROCEDURE — 71260 CT THORAX DX C+: CPT

## 2022-01-01 RX ORDER — LEVOTHYROXINE SODIUM 0.05 MG/1
50 TABLET ORAL DAILY
Status: DISCONTINUED | OUTPATIENT
Start: 2022-01-01 | End: 2022-01-01 | Stop reason: HOSPADM

## 2022-01-01 RX ORDER — ATORVASTATIN CALCIUM 40 MG/1
80 TABLET, FILM COATED ORAL DAILY
Status: DISCONTINUED | OUTPATIENT
Start: 2022-01-01 | End: 2022-01-01 | Stop reason: HOSPADM

## 2022-01-01 RX ORDER — SODIUM CHLORIDE 9 MG/ML
25 INJECTION, SOLUTION INTRAVENOUS PRN
Status: DISCONTINUED | OUTPATIENT
Start: 2022-01-01 | End: 2022-01-01 | Stop reason: HOSPADM

## 2022-01-01 RX ORDER — FLUOXETINE HYDROCHLORIDE 20 MG/1
20 CAPSULE ORAL DAILY
Qty: 30 CAPSULE | Refills: 3 | Status: SHIPPED | OUTPATIENT
Start: 2022-01-01

## 2022-01-01 RX ORDER — ASPIRIN 81 MG/1
81 TABLET, CHEWABLE ORAL DAILY
Status: DISCONTINUED | OUTPATIENT
Start: 2022-01-01 | End: 2022-01-01 | Stop reason: HOSPADM

## 2022-01-01 RX ORDER — HYDROCORTISONE 5 MG/1
5 TABLET ORAL DAILY
Status: ON HOLD | COMMUNITY
End: 2022-01-01 | Stop reason: HOSPADM

## 2022-01-01 RX ORDER — IPRATROPIUM BROMIDE AND ALBUTEROL SULFATE 2.5; .5 MG/3ML; MG/3ML
3 SOLUTION RESPIRATORY (INHALATION)
Qty: 360 ML | DISCHARGE
Start: 2022-01-01

## 2022-01-01 RX ORDER — ASPIRIN 81 MG/1
324 TABLET, CHEWABLE ORAL DAILY
Status: DISCONTINUED | OUTPATIENT
Start: 2022-01-01 | End: 2022-01-01

## 2022-01-01 RX ORDER — DRONABINOL 2.5 MG/1
2.5 CAPSULE ORAL 2 TIMES DAILY
Status: DISCONTINUED | OUTPATIENT
Start: 2022-01-01 | End: 2022-01-01 | Stop reason: HOSPADM

## 2022-01-01 RX ORDER — ACETAMINOPHEN 325 MG/1
650 TABLET ORAL EVERY 4 HOURS PRN
Status: DISCONTINUED | OUTPATIENT
Start: 2022-01-01 | End: 2022-01-01 | Stop reason: HOSPADM

## 2022-01-01 RX ORDER — PANTOPRAZOLE SODIUM 40 MG/1
40 TABLET, DELAYED RELEASE ORAL DAILY
COMMUNITY

## 2022-01-01 RX ORDER — DEXTROSE MONOHYDRATE 25 G/50ML
12.5 INJECTION, SOLUTION INTRAVENOUS PRN
Status: DISCONTINUED | OUTPATIENT
Start: 2022-01-01 | End: 2022-01-01

## 2022-01-01 RX ORDER — SODIUM CHLORIDE 0.9 % (FLUSH) 0.9 %
5-40 SYRINGE (ML) INJECTION EVERY 12 HOURS SCHEDULED
Status: DISCONTINUED | OUTPATIENT
Start: 2022-01-01 | End: 2022-01-01 | Stop reason: HOSPADM

## 2022-01-01 RX ORDER — IPRATROPIUM BROMIDE AND ALBUTEROL SULFATE 2.5; .5 MG/3ML; MG/3ML
1 SOLUTION RESPIRATORY (INHALATION)
Status: DISCONTINUED | OUTPATIENT
Start: 2022-01-01 | End: 2022-01-01 | Stop reason: HOSPADM

## 2022-01-01 RX ORDER — NYSTATIN 100000 [USP'U]/G
POWDER TOPICAL 2 TIMES DAILY
Qty: 60 G | Refills: 1 | Status: SHIPPED | OUTPATIENT
Start: 2022-01-01

## 2022-01-01 RX ORDER — BACLOFEN 10 MG/1
5 TABLET ORAL EVERY 12 HOURS PRN
Status: DISCONTINUED | OUTPATIENT
Start: 2022-01-01 | End: 2022-01-01 | Stop reason: HOSPADM

## 2022-01-01 RX ORDER — LORAZEPAM 2 MG/ML
0.5 INJECTION INTRAMUSCULAR EVERY 6 HOURS
Status: DISCONTINUED | OUTPATIENT
Start: 2022-01-01 | End: 2022-01-01 | Stop reason: HOSPADM

## 2022-01-01 RX ORDER — HYDROXYZINE HYDROCHLORIDE 10 MG/1
10 TABLET, FILM COATED ORAL EVERY 4 HOURS PRN
Status: DISCONTINUED | OUTPATIENT
Start: 2022-01-01 | End: 2022-01-01 | Stop reason: HOSPADM

## 2022-01-01 RX ORDER — POLYVINYL ALCOHOL 14 MG/ML
2 SOLUTION/ DROPS OPHTHALMIC
Status: DISCONTINUED | OUTPATIENT
Start: 2022-01-01 | End: 2022-01-01 | Stop reason: HOSPADM

## 2022-01-01 RX ORDER — POTASSIUM CHLORIDE 20 MEQ/1
40 TABLET, EXTENDED RELEASE ORAL ONCE
Status: COMPLETED | OUTPATIENT
Start: 2022-01-01 | End: 2022-01-01

## 2022-01-01 RX ORDER — DEXAMETHASONE 4 MG/1
4 TABLET ORAL
Status: DISCONTINUED | OUTPATIENT
Start: 2022-01-01 | End: 2022-01-01 | Stop reason: SDUPTHER

## 2022-01-01 RX ORDER — INSULIN GLARGINE 100 [IU]/ML
20 INJECTION, SOLUTION SUBCUTANEOUS NIGHTLY
Qty: 10 ML | Refills: 3 | DISCHARGE
Start: 2022-01-01

## 2022-01-01 RX ORDER — ONDANSETRON 4 MG/1
4 TABLET, ORALLY DISINTEGRATING ORAL EVERY 8 HOURS PRN
Status: DISCONTINUED | OUTPATIENT
Start: 2022-01-01 | End: 2022-01-01 | Stop reason: HOSPADM

## 2022-01-01 RX ORDER — INSULIN GLARGINE 100 [IU]/ML
50 INJECTION, SOLUTION SUBCUTANEOUS NIGHTLY
Status: ON HOLD | COMMUNITY
End: 2022-01-01 | Stop reason: HOSPADM

## 2022-01-01 RX ORDER — GUAIFENESIN 100 MG/5ML
10 SOLUTION ORAL EVERY 4 HOURS PRN
Status: DISCONTINUED | OUTPATIENT
Start: 2022-01-01 | End: 2022-01-01 | Stop reason: HOSPADM

## 2022-01-01 RX ORDER — OXYCODONE HYDROCHLORIDE 5 MG/1
5 TABLET ORAL EVERY 6 HOURS PRN
Qty: 12 TABLET | Refills: 0 | Status: SHIPPED | DISCHARGE
Start: 2022-01-01 | End: 2022-01-01

## 2022-01-01 RX ORDER — OXYCODONE HYDROCHLORIDE 5 MG/1
5 TABLET ORAL EVERY 4 HOURS PRN
Status: DISCONTINUED | OUTPATIENT
Start: 2022-01-01 | End: 2022-01-01 | Stop reason: HOSPADM

## 2022-01-01 RX ORDER — ALBUTEROL SULFATE 2.5 MG/3ML
2.5 SOLUTION RESPIRATORY (INHALATION)
Status: DISCONTINUED | OUTPATIENT
Start: 2022-01-01 | End: 2022-01-01 | Stop reason: HOSPADM

## 2022-01-01 RX ORDER — SODIUM CHLORIDE 0.9 % (FLUSH) 0.9 %
10 SYRINGE (ML) INJECTION PRN
Status: DISCONTINUED | OUTPATIENT
Start: 2022-01-01 | End: 2022-01-01 | Stop reason: HOSPADM

## 2022-01-01 RX ORDER — GUAIFENESIN/DEXTROMETHORPHAN 100-10MG/5
10 SYRUP ORAL EVERY 4 HOURS PRN
Status: DISCONTINUED | OUTPATIENT
Start: 2022-01-01 | End: 2022-01-01 | Stop reason: HOSPADM

## 2022-01-01 RX ORDER — NICOTINE POLACRILEX 4 MG
15 LOZENGE BUCCAL PRN
Status: DISCONTINUED | OUTPATIENT
Start: 2022-01-01 | End: 2022-01-01 | Stop reason: HOSPADM

## 2022-01-01 RX ORDER — ONDANSETRON 4 MG/1
4 TABLET, ORALLY DISINTEGRATING ORAL EVERY 6 HOURS PRN
Status: DISCONTINUED | OUTPATIENT
Start: 2022-01-01 | End: 2022-01-01 | Stop reason: HOSPADM

## 2022-01-01 RX ORDER — FERROUS SULFATE 325(65) MG
325 TABLET ORAL DAILY
Status: DISCONTINUED | OUTPATIENT
Start: 2022-01-01 | End: 2022-01-01 | Stop reason: HOSPADM

## 2022-01-01 RX ORDER — BENZONATATE 100 MG/1
100 CAPSULE ORAL 3 TIMES DAILY PRN
Status: DISCONTINUED | OUTPATIENT
Start: 2022-01-01 | End: 2022-01-01 | Stop reason: HOSPADM

## 2022-01-01 RX ORDER — AMMONIUM LACTATE 12 G/100G
LOTION TOPICAL 2 TIMES DAILY PRN
Status: DISCONTINUED | OUTPATIENT
Start: 2022-01-01 | End: 2022-01-01 | Stop reason: HOSPADM

## 2022-01-01 RX ORDER — FUROSEMIDE 40 MG/1
20 TABLET ORAL DAILY
Qty: 60 TABLET | Refills: 3 | DISCHARGE
Start: 2022-01-01

## 2022-01-01 RX ORDER — NICOTINE POLACRILEX 4 MG
15 LOZENGE BUCCAL PRN
Status: DISCONTINUED | OUTPATIENT
Start: 2022-01-01 | End: 2022-01-01 | Stop reason: CLARIF

## 2022-01-01 RX ORDER — DEXTROSE MONOHYDRATE 50 MG/ML
100 INJECTION, SOLUTION INTRAVENOUS PRN
Status: DISCONTINUED | OUTPATIENT
Start: 2022-01-01 | End: 2022-01-01 | Stop reason: HOSPADM

## 2022-01-01 RX ORDER — DRONABINOL 2.5 MG/1
2.5 CAPSULE ORAL 2 TIMES DAILY
Qty: 4 CAPSULE | Refills: 0 | Status: SHIPPED | OUTPATIENT
Start: 2022-01-01 | End: 2022-01-01

## 2022-01-01 RX ORDER — LORAZEPAM 2 MG/ML
0.5 INJECTION INTRAMUSCULAR
Status: DISCONTINUED | OUTPATIENT
Start: 2022-01-01 | End: 2022-01-01 | Stop reason: HOSPADM

## 2022-01-01 RX ORDER — DEXAMETHASONE 4 MG/1
2 TABLET ORAL 2 TIMES DAILY
Qty: 14 TABLET | Refills: 0 | Status: SHIPPED | OUTPATIENT
Start: 2022-01-01 | End: 2022-01-01

## 2022-01-01 RX ORDER — MAGNESIUM HYDROXIDE/ALUMINUM HYDROXICE/SIMETHICONE 120; 1200; 1200 MG/30ML; MG/30ML; MG/30ML
30 SUSPENSION ORAL EVERY 4 HOURS PRN
Status: DISCONTINUED | OUTPATIENT
Start: 2022-01-01 | End: 2022-01-01 | Stop reason: HOSPADM

## 2022-01-01 RX ORDER — INSULIN GLARGINE 100 [IU]/ML
30 INJECTION, SOLUTION SUBCUTANEOUS NIGHTLY
Status: DISCONTINUED | OUTPATIENT
Start: 2022-01-01 | End: 2022-01-01 | Stop reason: HOSPADM

## 2022-01-01 RX ORDER — BISACODYL 10 MG
10 SUPPOSITORY, RECTAL RECTAL DAILY PRN
Status: DISCONTINUED | OUTPATIENT
Start: 2022-01-01 | End: 2022-01-01 | Stop reason: HOSPADM

## 2022-01-01 RX ORDER — FOLIC ACID 1 MG/1
1 TABLET ORAL DAILY
Status: DISCONTINUED | OUTPATIENT
Start: 2022-01-01 | End: 2022-01-01 | Stop reason: HOSPADM

## 2022-01-01 RX ORDER — MEGESTROL ACETATE 40 MG/ML
120 SUSPENSION ORAL 2 TIMES DAILY
Status: DISCONTINUED | OUTPATIENT
Start: 2022-01-01 | End: 2022-01-01

## 2022-01-01 RX ORDER — FUROSEMIDE 10 MG/ML
40 INJECTION INTRAMUSCULAR; INTRAVENOUS ONCE
Status: COMPLETED | OUTPATIENT
Start: 2022-01-01 | End: 2022-01-01

## 2022-01-01 RX ORDER — ACETAMINOPHEN 325 MG/1
650 TABLET ORAL EVERY 6 HOURS PRN
Status: DISCONTINUED | OUTPATIENT
Start: 2022-01-01 | End: 2022-01-01 | Stop reason: HOSPADM

## 2022-01-01 RX ORDER — ONDANSETRON 2 MG/ML
4 INJECTION INTRAMUSCULAR; INTRAVENOUS EVERY 6 HOURS PRN
Status: DISCONTINUED | OUTPATIENT
Start: 2022-01-01 | End: 2022-01-01 | Stop reason: HOSPADM

## 2022-01-01 RX ORDER — MEGESTROL ACETATE 40 MG/ML
120 SUSPENSION ORAL 2 TIMES DAILY
Status: ON HOLD | COMMUNITY
End: 2022-01-01 | Stop reason: HOSPADM

## 2022-01-01 RX ORDER — LORAZEPAM 0.5 MG/1
0.5 TABLET ORAL 2 TIMES DAILY PRN
Qty: 60 TABLET | Refills: 1 | Status: SHIPPED | OUTPATIENT
Start: 2022-01-01 | End: 2022-01-01

## 2022-01-01 RX ORDER — FLUOXETINE 10 MG/1
CAPSULE ORAL
Qty: 90 CAPSULE | Refills: 0 | Status: SHIPPED
Start: 2022-01-01 | End: 2022-01-01

## 2022-01-01 RX ORDER — ASPIRIN 81 MG/1
324 TABLET, CHEWABLE ORAL ONCE
Status: COMPLETED | OUTPATIENT
Start: 2022-01-01 | End: 2022-01-01

## 2022-01-01 RX ORDER — POLYETHYLENE GLYCOL 3350 17 G/17G
17 POWDER, FOR SOLUTION ORAL DAILY PRN
Status: DISCONTINUED | OUTPATIENT
Start: 2022-01-01 | End: 2022-01-01 | Stop reason: HOSPADM

## 2022-01-01 RX ORDER — FLUOXETINE HYDROCHLORIDE 20 MG/1
20 CAPSULE ORAL DAILY
Status: DISCONTINUED | OUTPATIENT
Start: 2022-01-01 | End: 2022-01-01 | Stop reason: HOSPADM

## 2022-01-01 RX ORDER — OLANZAPINE 5 MG/1
5 TABLET, ORALLY DISINTEGRATING ORAL DAILY PRN
Status: DISCONTINUED | OUTPATIENT
Start: 2022-01-01 | End: 2022-01-01 | Stop reason: HOSPADM

## 2022-01-01 RX ORDER — INSULIN GLARGINE 100 [IU]/ML
20 INJECTION, SOLUTION SUBCUTANEOUS NIGHTLY
Status: DISCONTINUED | OUTPATIENT
Start: 2022-01-01 | End: 2022-01-01 | Stop reason: HOSPADM

## 2022-01-01 RX ORDER — 0.9 % SODIUM CHLORIDE 0.9 %
1000 INTRAVENOUS SOLUTION INTRAVENOUS ONCE
Status: DISCONTINUED | OUTPATIENT
Start: 2022-01-01 | End: 2022-01-01 | Stop reason: HOSPADM

## 2022-01-01 RX ORDER — FLUOXETINE 10 MG/1
10 TABLET, FILM COATED ORAL DAILY
Status: DISCONTINUED | OUTPATIENT
Start: 2022-01-01 | End: 2022-01-01 | Stop reason: HOSPADM

## 2022-01-01 RX ORDER — SENNA PLUS 8.6 MG/1
1 TABLET ORAL NIGHTLY PRN
Status: DISCONTINUED | OUTPATIENT
Start: 2022-01-01 | End: 2022-01-01 | Stop reason: HOSPADM

## 2022-01-01 RX ORDER — ACETAMINOPHEN 650 MG/1
650 SUPPOSITORY RECTAL EVERY 6 HOURS PRN
Status: DISCONTINUED | OUTPATIENT
Start: 2022-01-01 | End: 2022-01-01 | Stop reason: HOSPADM

## 2022-01-01 RX ORDER — SODIUM CHLORIDE FOR INHALATION 0.9 %
3 VIAL, NEBULIZER (ML) INHALATION
Status: DISCONTINUED | OUTPATIENT
Start: 2022-01-01 | End: 2022-01-01 | Stop reason: HOSPADM

## 2022-01-01 RX ORDER — ONDANSETRON 4 MG/1
4 TABLET, FILM COATED ORAL EVERY 6 HOURS PRN
Status: DISCONTINUED | OUTPATIENT
Start: 2022-01-01 | End: 2022-01-01 | Stop reason: HOSPADM

## 2022-01-01 RX ORDER — DEXAMETHASONE 4 MG/1
4 TABLET ORAL 2 TIMES DAILY
Status: DISCONTINUED | OUTPATIENT
Start: 2022-01-01 | End: 2022-01-01 | Stop reason: HOSPADM

## 2022-01-01 RX ORDER — MORPHINE SULFATE 2 MG/ML
2 INJECTION, SOLUTION INTRAMUSCULAR; INTRAVENOUS
Status: DISCONTINUED | OUTPATIENT
Start: 2022-01-01 | End: 2022-01-01 | Stop reason: HOSPADM

## 2022-01-01 RX ORDER — DOCUSATE SODIUM 100 MG/1
200 CAPSULE, LIQUID FILLED ORAL DAILY PRN
Status: DISCONTINUED | OUTPATIENT
Start: 2022-01-01 | End: 2022-01-01 | Stop reason: HOSPADM

## 2022-01-01 RX ORDER — SENNA AND DOCUSATE SODIUM 50; 8.6 MG/1; MG/1
2 TABLET, FILM COATED ORAL NIGHTLY
Qty: 60 TABLET | Refills: 5 | Status: SHIPPED | OUTPATIENT
Start: 2022-01-01 | End: 2022-04-16

## 2022-01-01 RX ORDER — MORPHINE SULFATE 2 MG/ML
2 INJECTION, SOLUTION INTRAMUSCULAR; INTRAVENOUS EVERY 6 HOURS
Status: DISCONTINUED | OUTPATIENT
Start: 2022-01-01 | End: 2022-01-01 | Stop reason: HOSPADM

## 2022-01-01 RX ORDER — CEPHALEXIN 500 MG/1
500 CAPSULE ORAL 4 TIMES DAILY
Refills: 0 | DISCHARGE
Start: 2022-01-01 | End: 2022-01-01

## 2022-01-01 RX ORDER — SODIUM CHLORIDE 9 MG/ML
INJECTION, SOLUTION INTRAVENOUS PRN
Status: DISCONTINUED | OUTPATIENT
Start: 2022-01-01 | End: 2022-01-01 | Stop reason: HOSPADM

## 2022-01-01 RX ORDER — DEXTROSE MONOHYDRATE 25 G/50ML
12.5 INJECTION, SOLUTION INTRAVENOUS PRN
Status: DISCONTINUED | OUTPATIENT
Start: 2022-01-01 | End: 2022-01-01 | Stop reason: CLARIF

## 2022-01-01 RX ORDER — SENNA AND DOCUSATE SODIUM 50; 8.6 MG/1; MG/1
1 TABLET, FILM COATED ORAL 2 TIMES DAILY
Status: DISCONTINUED | OUTPATIENT
Start: 2022-01-01 | End: 2022-01-01 | Stop reason: HOSPADM

## 2022-01-01 RX ORDER — 0.9 % SODIUM CHLORIDE 0.9 %
1000 INTRAVENOUS SOLUTION INTRAVENOUS ONCE
Status: COMPLETED | OUTPATIENT
Start: 2022-01-01 | End: 2022-01-01

## 2022-01-01 RX ORDER — SODIUM CHLORIDE 0.9 % (FLUSH) 0.9 %
5-40 SYRINGE (ML) INJECTION PRN
Status: DISCONTINUED | OUTPATIENT
Start: 2022-01-01 | End: 2022-01-01 | Stop reason: HOSPADM

## 2022-01-01 RX ORDER — INSULIN GLARGINE 100 [IU]/ML
30 INJECTION, SOLUTION SUBCUTANEOUS NIGHTLY
Qty: 10 ML | Refills: 3 | Status: ON HOLD | DISCHARGE
Start: 2022-01-01 | End: 2022-01-01

## 2022-01-01 RX ORDER — SODIUM CHLORIDE 9 MG/ML
INJECTION, SOLUTION INTRAVENOUS CONTINUOUS
Status: DISCONTINUED | OUTPATIENT
Start: 2022-01-01 | End: 2022-01-01 | Stop reason: HOSPADM

## 2022-01-01 RX ORDER — LOPERAMIDE HYDROCHLORIDE 2 MG/1
2 CAPSULE ORAL PRN
Status: DISCONTINUED | OUTPATIENT
Start: 2022-01-01 | End: 2022-01-01 | Stop reason: HOSPADM

## 2022-01-01 RX ORDER — FERROUS SULFATE 325(65) MG
325 TABLET ORAL DAILY
COMMUNITY

## 2022-01-01 RX ORDER — PANTOPRAZOLE SODIUM 40 MG/1
40 TABLET, DELAYED RELEASE ORAL
Status: DISCONTINUED | OUTPATIENT
Start: 2022-01-01 | End: 2022-01-01 | Stop reason: HOSPADM

## 2022-01-01 RX ORDER — MORPHINE SULFATE 2 MG/ML
2 INJECTION, SOLUTION INTRAMUSCULAR; INTRAVENOUS
Qty: 24 EACH | Refills: 0 | Status: SHIPPED | OUTPATIENT
Start: 2022-01-01 | End: 2022-01-01

## 2022-01-01 RX ORDER — OXYCODONE HYDROCHLORIDE 5 MG/1
1 TABLET ORAL EVERY 4 HOURS PRN
Status: ON HOLD | COMMUNITY
Start: 2022-01-01 | End: 2022-01-01 | Stop reason: SDUPTHER

## 2022-01-01 RX ORDER — ACETAMINOPHEN 650 MG/1
650 SUPPOSITORY RECTAL EVERY 4 HOURS PRN
Status: DISCONTINUED | OUTPATIENT
Start: 2022-01-01 | End: 2022-01-01 | Stop reason: HOSPADM

## 2022-01-01 RX ORDER — GLYCOPYRROLATE 0.2 MG/ML
0.4 INJECTION INTRAMUSCULAR; INTRAVENOUS EVERY 4 HOURS PRN
Status: DISCONTINUED | OUTPATIENT
Start: 2022-01-01 | End: 2022-01-01 | Stop reason: HOSPADM

## 2022-01-01 RX ORDER — PANTOPRAZOLE SODIUM 40 MG/1
40 TABLET, DELAYED RELEASE ORAL DAILY
Status: DISCONTINUED | OUTPATIENT
Start: 2022-01-01 | End: 2022-01-01 | Stop reason: HOSPADM

## 2022-01-01 RX ORDER — LANOLIN ALCOHOL/MO/W.PET/CERES
6 CREAM (GRAM) TOPICAL NIGHTLY PRN
Status: DISCONTINUED | OUTPATIENT
Start: 2022-01-01 | End: 2022-01-01 | Stop reason: HOSPADM

## 2022-01-01 RX ORDER — FUROSEMIDE 40 MG/1
40 TABLET ORAL DAILY
Status: ON HOLD | COMMUNITY
End: 2022-01-01 | Stop reason: SDUPTHER

## 2022-01-01 RX ADMIN — NYSTATIN 500000 UNITS: 500000 SUSPENSION ORAL at 12:33

## 2022-01-01 RX ADMIN — ATORVASTATIN CALCIUM 80 MG: 40 TABLET, FILM COATED ORAL at 20:32

## 2022-01-01 RX ADMIN — ATORVASTATIN CALCIUM 80 MG: 40 TABLET, FILM COATED ORAL at 20:30

## 2022-01-01 RX ADMIN — Medication 2000 MG: at 20:20

## 2022-01-01 RX ADMIN — FOLIC ACID 1 MG: 1 TABLET ORAL at 08:21

## 2022-01-01 RX ADMIN — AZITHROMYCIN 500 MG: 500 INJECTION, POWDER, LYOPHILIZED, FOR SOLUTION INTRAVENOUS at 02:45

## 2022-01-01 RX ADMIN — DRONABINOL 2.5 MG: 2.5 CAPSULE ORAL at 20:30

## 2022-01-01 RX ADMIN — IPRATROPIUM BROMIDE AND ALBUTEROL SULFATE 1 AMPULE: .5; 2.5 SOLUTION RESPIRATORY (INHALATION) at 08:23

## 2022-01-01 RX ADMIN — DOCUSATE SODIUM 50 MG AND SENNOSIDES 8.6 MG 1 TABLET: 8.6; 5 TABLET, FILM COATED ORAL at 08:05

## 2022-01-01 RX ADMIN — INSULIN GLARGINE 20 UNITS: 100 INJECTION, SOLUTION SUBCUTANEOUS at 20:29

## 2022-01-01 RX ADMIN — ASPIRIN 81 MG: 81 TABLET, CHEWABLE ORAL at 08:22

## 2022-01-01 RX ADMIN — DRONABINOL 2.5 MG: 2.5 CAPSULE ORAL at 14:03

## 2022-01-01 RX ADMIN — IPRATROPIUM BROMIDE AND ALBUTEROL SULFATE 1 AMPULE: .5; 2.5 SOLUTION RESPIRATORY (INHALATION) at 08:31

## 2022-01-01 RX ADMIN — FLUOXETINE 20 MG: 20 CAPSULE ORAL at 08:59

## 2022-01-01 RX ADMIN — NYSTATIN 500000 UNITS: 500000 SUSPENSION ORAL at 17:04

## 2022-01-01 RX ADMIN — SODIUM CHLORIDE, PRESERVATIVE FREE 10 ML: 5 INJECTION INTRAVENOUS at 08:39

## 2022-01-01 RX ADMIN — FOLIC ACID 1 MG: 1 TABLET ORAL at 08:03

## 2022-01-01 RX ADMIN — LORAZEPAM 0.5 MG: 2 INJECTION INTRAMUSCULAR; INTRAVENOUS at 00:10

## 2022-01-01 RX ADMIN — INSULIN LISPRO 4 UNITS: 100 INJECTION, SOLUTION INTRAVENOUS; SUBCUTANEOUS at 06:20

## 2022-01-01 RX ADMIN — DOCUSATE SODIUM 50 MG AND SENNOSIDES 8.6 MG 1 TABLET: 8.6; 5 TABLET, FILM COATED ORAL at 08:59

## 2022-01-01 RX ADMIN — FOLIC ACID 1 MG: 1 TABLET ORAL at 08:05

## 2022-01-01 RX ADMIN — Medication 10 ML: at 20:44

## 2022-01-01 RX ADMIN — IPRATROPIUM BROMIDE AND ALBUTEROL SULFATE 1 AMPULE: .5; 2.5 SOLUTION RESPIRATORY (INHALATION) at 20:53

## 2022-01-01 RX ADMIN — VANCOMYCIN HYDROCHLORIDE 1500 MG: 10 INJECTION, POWDER, LYOPHILIZED, FOR SOLUTION INTRAVENOUS at 02:42

## 2022-01-01 RX ADMIN — DOCUSATE SODIUM 50 MG AND SENNOSIDES 8.6 MG 1 TABLET: 8.6; 5 TABLET, FILM COATED ORAL at 10:40

## 2022-01-01 RX ADMIN — IPRATROPIUM BROMIDE AND ALBUTEROL SULFATE 1 AMPULE: .5; 2.5 SOLUTION RESPIRATORY (INHALATION) at 16:54

## 2022-01-01 RX ADMIN — DEXAMETHASONE 4 MG: 4 TABLET ORAL at 08:21

## 2022-01-01 RX ADMIN — INSULIN LISPRO 5 UNITS: 100 INJECTION, SOLUTION INTRAVENOUS; SUBCUTANEOUS at 11:22

## 2022-01-01 RX ADMIN — FLUOXETINE 20 MG: 20 CAPSULE ORAL at 08:05

## 2022-01-01 RX ADMIN — ENOXAPARIN SODIUM 70 MG: 100 INJECTION SUBCUTANEOUS at 02:51

## 2022-01-01 RX ADMIN — FERROUS SULFATE TAB 325 MG (65 MG ELEMENTAL FE) 325 MG: 325 (65 FE) TAB at 08:02

## 2022-01-01 RX ADMIN — IOPAMIDOL 75 ML: 755 INJECTION, SOLUTION INTRAVENOUS at 15:28

## 2022-01-01 RX ADMIN — SODIUM CHLORIDE: 9 INJECTION, SOLUTION INTRAVENOUS at 02:40

## 2022-01-01 RX ADMIN — FUROSEMIDE 40 MG: 10 INJECTION, SOLUTION INTRAMUSCULAR; INTRAVENOUS at 12:38

## 2022-01-01 RX ADMIN — SODIUM CHLORIDE, PRESERVATIVE FREE 10 ML: 5 INJECTION INTRAVENOUS at 11:04

## 2022-01-01 RX ADMIN — POTASSIUM CHLORIDE 40 MEQ: 20 TABLET, EXTENDED RELEASE ORAL at 12:37

## 2022-01-01 RX ADMIN — ENOXAPARIN SODIUM 80 MG: 100 INJECTION SUBCUTANEOUS at 08:03

## 2022-01-01 RX ADMIN — DEXAMETHASONE 4 MG: 4 TABLET ORAL at 11:09

## 2022-01-01 RX ADMIN — IOHEXOL 50 ML: 240 INJECTION, SOLUTION INTRATHECAL; INTRAVASCULAR; INTRAVENOUS; ORAL at 15:28

## 2022-01-01 RX ADMIN — SODIUM CHLORIDE, PRESERVATIVE FREE 10 ML: 5 INJECTION INTRAVENOUS at 20:32

## 2022-01-01 RX ADMIN — SODIUM CHLORIDE, PRESERVATIVE FREE 10 ML: 5 INJECTION INTRAVENOUS at 08:06

## 2022-01-01 RX ADMIN — LORAZEPAM 0.5 MG: 2 INJECTION INTRAMUSCULAR; INTRAVENOUS at 20:50

## 2022-01-01 RX ADMIN — SODIUM CHLORIDE 100 MG: 9 INJECTION, SOLUTION INTRAVENOUS at 17:00

## 2022-01-01 RX ADMIN — SODIUM CHLORIDE, PRESERVATIVE FREE 10 ML: 5 INJECTION INTRAVENOUS at 15:28

## 2022-01-01 RX ADMIN — FUROSEMIDE 40 MG: 10 INJECTION, SOLUTION INTRAMUSCULAR; INTRAVENOUS at 18:47

## 2022-01-01 RX ADMIN — INSULIN LISPRO 5 UNITS: 100 INJECTION, SOLUTION INTRAVENOUS; SUBCUTANEOUS at 20:43

## 2022-01-01 RX ADMIN — FOLIC ACID 1 MG: 1 TABLET ORAL at 10:40

## 2022-01-01 RX ADMIN — LEVOTHYROXINE SODIUM 50 MCG: 50 TABLET ORAL at 05:21

## 2022-01-01 RX ADMIN — Medication 2000 MG: at 03:38

## 2022-01-01 RX ADMIN — Medication 10 ML: at 08:11

## 2022-01-01 RX ADMIN — ENOXAPARIN SODIUM 80 MG: 100 INJECTION SUBCUTANEOUS at 20:09

## 2022-01-01 RX ADMIN — LORAZEPAM 0.5 MG: 2 INJECTION INTRAMUSCULAR; INTRAVENOUS at 17:10

## 2022-01-01 RX ADMIN — Medication 2000 MG: at 20:18

## 2022-01-01 RX ADMIN — INSULIN LISPRO 6 UNITS: 100 INJECTION, SOLUTION INTRAVENOUS; SUBCUTANEOUS at 20:25

## 2022-01-01 RX ADMIN — ACETAMINOPHEN 650 MG: 325 TABLET ORAL at 08:59

## 2022-01-01 RX ADMIN — FOLIC ACID 1 MG: 1 TABLET ORAL at 08:24

## 2022-01-01 RX ADMIN — DRONABINOL 2.5 MG: 2.5 CAPSULE ORAL at 20:32

## 2022-01-01 RX ADMIN — FLUOXETINE 20 MG: 20 CAPSULE ORAL at 10:40

## 2022-01-01 RX ADMIN — ACETAMINOPHEN 650 MG: 325 TABLET ORAL at 17:04

## 2022-01-01 RX ADMIN — SODIUM CHLORIDE, PRESERVATIVE FREE 10 ML: 5 INJECTION INTRAVENOUS at 08:59

## 2022-01-01 RX ADMIN — INSULIN LISPRO 2 UNITS: 100 INJECTION, SOLUTION INTRAVENOUS; SUBCUTANEOUS at 05:23

## 2022-01-01 RX ADMIN — ATORVASTATIN CALCIUM 80 MG: 40 TABLET, FILM COATED ORAL at 08:09

## 2022-01-01 RX ADMIN — ENOXAPARIN SODIUM 70 MG: 100 INJECTION SUBCUTANEOUS at 08:04

## 2022-01-01 RX ADMIN — ENOXAPARIN SODIUM 40 MG: 100 INJECTION SUBCUTANEOUS at 17:37

## 2022-01-01 RX ADMIN — DEXAMETHASONE 4 MG: 4 TABLET ORAL at 11:43

## 2022-01-01 RX ADMIN — FOLIC ACID 1 MG: 1 TABLET ORAL at 09:00

## 2022-01-01 RX ADMIN — MEGESTROL ACETATE 120 MG: 40 SUSPENSION ORAL at 08:02

## 2022-01-01 RX ADMIN — MORPHINE SULFATE 2 MG: 2 INJECTION, SOLUTION INTRAMUSCULAR; INTRAVENOUS at 08:38

## 2022-01-01 RX ADMIN — FERROUS SULFATE TAB 325 MG (65 MG ELEMENTAL FE) 325 MG: 325 (65 FE) TAB at 08:23

## 2022-01-01 RX ADMIN — MORPHINE SULFATE 2 MG: 2 INJECTION, SOLUTION INTRAMUSCULAR; INTRAVENOUS at 00:10

## 2022-01-01 RX ADMIN — PANTOPRAZOLE SODIUM 40 MG: 40 TABLET, DELAYED RELEASE ORAL at 06:20

## 2022-01-01 RX ADMIN — PANTOPRAZOLE SODIUM 40 MG: 40 TABLET, DELAYED RELEASE ORAL at 06:06

## 2022-01-01 RX ADMIN — LORAZEPAM 0.5 MG: 2 INJECTION INTRAMUSCULAR; INTRAVENOUS at 18:16

## 2022-01-01 RX ADMIN — OXYCODONE 5 MG: 5 TABLET ORAL at 03:15

## 2022-01-01 RX ADMIN — MORPHINE SULFATE 2 MG: 2 INJECTION, SOLUTION INTRAMUSCULAR; INTRAVENOUS at 03:25

## 2022-01-01 RX ADMIN — INSULIN LISPRO 6 UNITS: 100 INJECTION, SOLUTION INTRAVENOUS; SUBCUTANEOUS at 05:13

## 2022-01-01 RX ADMIN — NYSTATIN 500000 UNITS: 500000 SUSPENSION ORAL at 15:25

## 2022-01-01 RX ADMIN — FOLIC ACID 1 MG: 1 TABLET ORAL at 08:02

## 2022-01-01 RX ADMIN — FLUOXETINE HYDROCHLORIDE 10 MG: 10 TABLET ORAL at 08:22

## 2022-01-01 RX ADMIN — PANTOPRAZOLE SODIUM 40 MG: 40 TABLET, DELAYED RELEASE ORAL at 08:24

## 2022-01-01 RX ADMIN — ENOXAPARIN SODIUM 70 MG: 100 INJECTION SUBCUTANEOUS at 14:03

## 2022-01-01 RX ADMIN — INSULIN LISPRO 2 UNITS: 100 INJECTION, SOLUTION INTRAVENOUS; SUBCUTANEOUS at 20:32

## 2022-01-01 RX ADMIN — ASPIRIN 81 MG: 81 TABLET, CHEWABLE ORAL at 08:03

## 2022-01-01 RX ADMIN — ASPIRIN 81 MG: 81 TABLET, CHEWABLE ORAL at 10:40

## 2022-01-01 RX ADMIN — SODIUM CHLORIDE, PRESERVATIVE FREE 10 ML: 5 INJECTION INTRAVENOUS at 11:50

## 2022-01-01 RX ADMIN — Medication 2000 MG: at 04:01

## 2022-01-01 RX ADMIN — IPRATROPIUM BROMIDE AND ALBUTEROL SULFATE 1 AMPULE: .5; 2.5 SOLUTION RESPIRATORY (INHALATION) at 11:48

## 2022-01-01 RX ADMIN — ASPIRIN 81 MG: 81 TABLET, CHEWABLE ORAL at 08:05

## 2022-01-01 RX ADMIN — FERROUS SULFATE TAB 325 MG (65 MG ELEMENTAL FE) 325 MG: 325 (65 FE) TAB at 08:03

## 2022-01-01 RX ADMIN — NYSTATIN 500000 UNITS: 500000 SUSPENSION ORAL at 17:37

## 2022-01-01 RX ADMIN — ATORVASTATIN CALCIUM 80 MG: 40 TABLET, FILM COATED ORAL at 20:09

## 2022-01-01 RX ADMIN — IOPAMIDOL 75 ML: 755 INJECTION, SOLUTION INTRAVENOUS at 11:44

## 2022-01-01 RX ADMIN — MORPHINE SULFATE 2 MG: 2 INJECTION, SOLUTION INTRAMUSCULAR; INTRAVENOUS at 17:10

## 2022-01-01 RX ADMIN — IPRATROPIUM BROMIDE AND ALBUTEROL SULFATE 1 AMPULE: .5; 2.5 SOLUTION RESPIRATORY (INHALATION) at 15:53

## 2022-01-01 RX ADMIN — INSULIN GLARGINE 30 UNITS: 100 INJECTION, SOLUTION SUBCUTANEOUS at 20:26

## 2022-01-01 RX ADMIN — SODIUM CHLORIDE, PRESERVATIVE FREE 10 ML: 5 INJECTION INTRAVENOUS at 10:43

## 2022-01-01 RX ADMIN — LORAZEPAM 0.5 MG: 2 INJECTION INTRAMUSCULAR; INTRAVENOUS at 00:09

## 2022-01-01 RX ADMIN — CEFEPIME HYDROCHLORIDE 2000 MG: 2 INJECTION, POWDER, FOR SOLUTION INTRAVENOUS at 01:09

## 2022-01-01 RX ADMIN — DEXAMETHASONE 4 MG: 4 TABLET ORAL at 12:23

## 2022-01-01 RX ADMIN — Medication 5 ML: at 08:23

## 2022-01-01 RX ADMIN — FLUOXETINE HYDROCHLORIDE 10 MG: 10 TABLET ORAL at 08:01

## 2022-01-01 RX ADMIN — BENZONATATE 100 MG: 100 CAPSULE ORAL at 11:21

## 2022-01-01 RX ADMIN — INSULIN LISPRO 1 UNITS: 100 INJECTION, SOLUTION INTRAVENOUS; SUBCUTANEOUS at 20:14

## 2022-01-01 RX ADMIN — MAGNESIUM HYDROXIDE 30 ML: 400 SUSPENSION ORAL at 11:23

## 2022-01-01 RX ADMIN — IPRATROPIUM BROMIDE AND ALBUTEROL SULFATE 1 AMPULE: .5; 2.5 SOLUTION RESPIRATORY (INHALATION) at 12:10

## 2022-01-01 RX ADMIN — IPRATROPIUM BROMIDE AND ALBUTEROL SULFATE 1 AMPULE: .5; 2.5 SOLUTION RESPIRATORY (INHALATION) at 11:39

## 2022-01-01 RX ADMIN — ATORVASTATIN CALCIUM 80 MG: 40 TABLET, FILM COATED ORAL at 08:22

## 2022-01-01 RX ADMIN — PANTOPRAZOLE SODIUM 40 MG: 40 TABLET, DELAYED RELEASE ORAL at 06:23

## 2022-01-01 RX ADMIN — FERROUS SULFATE TAB 325 MG (65 MG ELEMENTAL FE) 325 MG: 325 (65 FE) TAB at 08:59

## 2022-01-01 RX ADMIN — LORAZEPAM 0.5 MG: 2 INJECTION INTRAMUSCULAR; INTRAVENOUS at 05:52

## 2022-01-01 RX ADMIN — ENOXAPARIN SODIUM 40 MG: 100 INJECTION SUBCUTANEOUS at 18:07

## 2022-01-01 RX ADMIN — INSULIN LISPRO 1 UNITS: 100 INJECTION, SOLUTION INTRAVENOUS; SUBCUTANEOUS at 06:08

## 2022-01-01 RX ADMIN — INSULIN GLARGINE 20 UNITS: 100 INJECTION, SOLUTION SUBCUTANEOUS at 16:11

## 2022-01-01 RX ADMIN — PETROLATUM: 420 OINTMENT TOPICAL at 08:14

## 2022-01-01 RX ADMIN — INSULIN LISPRO 6 UNITS: 100 INJECTION, SOLUTION INTRAVENOUS; SUBCUTANEOUS at 20:29

## 2022-01-01 RX ADMIN — Medication 6 MG: at 20:09

## 2022-01-01 RX ADMIN — INSULIN LISPRO 2 UNITS: 100 INJECTION, SOLUTION INTRAVENOUS; SUBCUTANEOUS at 17:11

## 2022-01-01 RX ADMIN — Medication: at 21:08

## 2022-01-01 RX ADMIN — MEGESTROL ACETATE 120 MG: 40 SUSPENSION ORAL at 19:50

## 2022-01-01 RX ADMIN — NYSTATIN 500000 UNITS: 500000 SUSPENSION ORAL at 08:23

## 2022-01-01 RX ADMIN — INSULIN LISPRO 4 UNITS: 100 INJECTION, SOLUTION INTRAVENOUS; SUBCUTANEOUS at 16:16

## 2022-01-01 RX ADMIN — MORPHINE SULFATE 2 MG: 2 INJECTION, SOLUTION INTRAMUSCULAR; INTRAVENOUS at 08:10

## 2022-01-01 RX ADMIN — IPRATROPIUM BROMIDE AND ALBUTEROL SULFATE 1 AMPULE: .5; 2.5 SOLUTION RESPIRATORY (INHALATION) at 08:15

## 2022-01-01 RX ADMIN — LEVOTHYROXINE SODIUM 50 MCG: 0.05 TABLET ORAL at 06:23

## 2022-01-01 RX ADMIN — INSULIN LISPRO 4 UNITS: 100 INJECTION, SOLUTION INTRAVENOUS; SUBCUTANEOUS at 10:54

## 2022-01-01 RX ADMIN — DOCUSATE SODIUM 50 MG AND SENNOSIDES 8.6 MG 1 TABLET: 8.6; 5 TABLET, FILM COATED ORAL at 08:03

## 2022-01-01 RX ADMIN — FERROUS SULFATE TAB 325 MG (65 MG ELEMENTAL FE) 325 MG: 325 (65 FE) TAB at 10:40

## 2022-01-01 RX ADMIN — Medication 6 MG: at 20:32

## 2022-01-01 RX ADMIN — Medication 10 ML: at 08:24

## 2022-01-01 RX ADMIN — LORAZEPAM 0.5 MG: 2 INJECTION INTRAMUSCULAR; INTRAVENOUS at 21:14

## 2022-01-01 RX ADMIN — GLYCOPYRROLATE 0.4 MG: 0.2 INJECTION INTRAMUSCULAR; INTRAVENOUS at 08:38

## 2022-01-01 RX ADMIN — INSULIN LISPRO 6 UNITS: 100 INJECTION, SOLUTION INTRAVENOUS; SUBCUTANEOUS at 11:10

## 2022-01-01 RX ADMIN — PETROLATUM: 420 OINTMENT TOPICAL at 08:05

## 2022-01-01 RX ADMIN — GLYCOPYRROLATE 0.4 MG: 0.2 INJECTION INTRAMUSCULAR; INTRAVENOUS at 05:52

## 2022-01-01 RX ADMIN — ATORVASTATIN CALCIUM 80 MG: 40 TABLET, FILM COATED ORAL at 08:23

## 2022-01-01 RX ADMIN — MORPHINE SULFATE 2 MG: 2 INJECTION, SOLUTION INTRAMUSCULAR; INTRAVENOUS at 18:16

## 2022-01-01 RX ADMIN — FLUOXETINE HYDROCHLORIDE 10 MG: 10 TABLET ORAL at 08:23

## 2022-01-01 RX ADMIN — MORPHINE SULFATE 2 MG: 2 INJECTION, SOLUTION INTRAMUSCULAR; INTRAVENOUS at 19:55

## 2022-01-01 RX ADMIN — SODIUM CHLORIDE 25 MG: 9 INJECTION, SOLUTION INTRAVENOUS at 16:13

## 2022-01-01 RX ADMIN — MORPHINE SULFATE 2 MG: 2 INJECTION, SOLUTION INTRAMUSCULAR; INTRAVENOUS at 20:49

## 2022-01-01 RX ADMIN — ASPIRIN 243 MG: 81 TABLET, CHEWABLE ORAL at 12:37

## 2022-01-01 RX ADMIN — MORPHINE SULFATE 2 MG: 2 INJECTION, SOLUTION INTRAMUSCULAR; INTRAVENOUS at 21:14

## 2022-01-01 RX ADMIN — LEVOTHYROXINE SODIUM 50 MCG: 0.05 TABLET ORAL at 05:55

## 2022-01-01 RX ADMIN — MORPHINE SULFATE 2 MG: 2 INJECTION, SOLUTION INTRAMUSCULAR; INTRAVENOUS at 11:49

## 2022-01-01 RX ADMIN — Medication 2000 MG: at 20:46

## 2022-01-01 RX ADMIN — IOPAMIDOL 75 ML: 755 INJECTION, SOLUTION INTRAVENOUS at 22:52

## 2022-01-01 RX ADMIN — ENOXAPARIN SODIUM 70 MG: 100 INJECTION SUBCUTANEOUS at 02:31

## 2022-01-01 RX ADMIN — SODIUM CHLORIDE: 9 INJECTION, SOLUTION INTRAVENOUS at 20:13

## 2022-01-01 RX ADMIN — MAGNESIUM HYDROXIDE 30 ML: 400 SUSPENSION ORAL at 08:59

## 2022-01-01 RX ADMIN — DEXAMETHASONE 4 MG: 4 TABLET ORAL at 08:24

## 2022-01-01 RX ADMIN — INSULIN LISPRO 12 UNITS: 100 INJECTION, SOLUTION INTRAVENOUS; SUBCUTANEOUS at 17:15

## 2022-01-01 RX ADMIN — SODIUM CHLORIDE, PRESERVATIVE FREE 10 ML: 5 INJECTION INTRAVENOUS at 08:05

## 2022-01-01 RX ADMIN — Medication 6 MG: at 20:42

## 2022-01-01 RX ADMIN — MORPHINE SULFATE 2 MG: 2 INJECTION, SOLUTION INTRAMUSCULAR; INTRAVENOUS at 11:04

## 2022-01-01 RX ADMIN — GLYCOPYRROLATE 0.4 MG: 0.2 INJECTION INTRAMUSCULAR; INTRAVENOUS at 03:25

## 2022-01-01 RX ADMIN — LORAZEPAM 0.5 MG: 2 INJECTION INTRAMUSCULAR; INTRAVENOUS at 08:10

## 2022-01-01 RX ADMIN — LEVOTHYROXINE SODIUM 50 MCG: 0.05 TABLET ORAL at 06:06

## 2022-01-01 RX ADMIN — NYSTATIN 500000 UNITS: 500000 SUSPENSION ORAL at 08:22

## 2022-01-01 RX ADMIN — LORAZEPAM 0.5 MG: 2 INJECTION INTRAMUSCULAR; INTRAVENOUS at 08:38

## 2022-01-01 RX ADMIN — MORPHINE SULFATE 2 MG: 2 INJECTION, SOLUTION INTRAMUSCULAR; INTRAVENOUS at 00:09

## 2022-01-01 RX ADMIN — LEVOTHYROXINE SODIUM 50 MCG: 50 TABLET ORAL at 05:23

## 2022-01-01 RX ADMIN — SODIUM PHOSPHATE, MONOBASIC, MONOHYDRATE 10 MMOL: 276; 142 INJECTION, SOLUTION INTRAVENOUS at 14:15

## 2022-01-01 RX ADMIN — SODIUM CHLORIDE: 9 INJECTION, SOLUTION INTRAVENOUS at 05:57

## 2022-01-01 RX ADMIN — Medication 2000 MG: at 03:52

## 2022-01-01 RX ADMIN — ENOXAPARIN SODIUM 40 MG: 100 INJECTION SUBCUTANEOUS at 17:23

## 2022-01-01 RX ADMIN — NYSTATIN 500000 UNITS: 500000 SUSPENSION ORAL at 11:43

## 2022-01-01 RX ADMIN — DOCUSATE SODIUM 50 MG AND SENNOSIDES 8.6 MG 1 TABLET: 8.6; 5 TABLET, FILM COATED ORAL at 02:50

## 2022-01-01 RX ADMIN — PANTOPRAZOLE SODIUM 40 MG: 40 TABLET, DELAYED RELEASE ORAL at 08:22

## 2022-01-01 RX ADMIN — LORAZEPAM 0.5 MG: 2 INJECTION INTRAMUSCULAR; INTRAVENOUS at 11:04

## 2022-01-01 RX ADMIN — DRONABINOL 2.5 MG: 2.5 CAPSULE ORAL at 10:40

## 2022-01-01 RX ADMIN — DRONABINOL 2.5 MG: 2.5 CAPSULE ORAL at 08:03

## 2022-01-01 RX ADMIN — INSULIN LISPRO 2 UNITS: 100 INJECTION, SOLUTION INTRAVENOUS; SUBCUTANEOUS at 11:02

## 2022-01-01 RX ADMIN — SODIUM CHLORIDE, PRESERVATIVE FREE 10 ML: 5 INJECTION INTRAVENOUS at 08:10

## 2022-01-01 RX ADMIN — MORPHINE SULFATE 2 MG: 2 INJECTION, SOLUTION INTRAMUSCULAR; INTRAVENOUS at 09:08

## 2022-01-01 RX ADMIN — SODIUM CHLORIDE, PRESERVATIVE FREE 10 ML: 5 INJECTION INTRAVENOUS at 20:10

## 2022-01-01 RX ADMIN — INSULIN LISPRO 8 UNITS: 100 INJECTION, SOLUTION INTRAVENOUS; SUBCUTANEOUS at 11:46

## 2022-01-01 RX ADMIN — INSULIN GLARGINE 20 UNITS: 100 INJECTION, SOLUTION SUBCUTANEOUS at 20:14

## 2022-01-01 RX ADMIN — OXYCODONE 5 MG: 5 TABLET ORAL at 23:56

## 2022-01-01 RX ADMIN — LEVOTHYROXINE SODIUM 50 MCG: 0.05 TABLET ORAL at 06:20

## 2022-01-01 RX ADMIN — DRONABINOL 2.5 MG: 2.5 CAPSULE ORAL at 08:05

## 2022-01-01 RX ADMIN — MORPHINE SULFATE 2 MG: 2 INJECTION, SOLUTION INTRAMUSCULAR; INTRAVENOUS at 13:48

## 2022-01-01 RX ADMIN — Medication 2000 MG: at 11:44

## 2022-01-01 RX ADMIN — LORAZEPAM 0.5 MG: 2 INJECTION INTRAMUSCULAR; INTRAVENOUS at 11:49

## 2022-01-01 RX ADMIN — GLYCOPYRROLATE 0.4 MG: 0.2 INJECTION INTRAMUSCULAR; INTRAVENOUS at 21:14

## 2022-01-01 RX ADMIN — INSULIN LISPRO 4 UNITS: 100 INJECTION, SOLUTION INTRAVENOUS; SUBCUTANEOUS at 11:13

## 2022-01-01 RX ADMIN — INSULIN LISPRO 4 UNITS: 100 INJECTION, SOLUTION INTRAVENOUS; SUBCUTANEOUS at 15:42

## 2022-01-01 RX ADMIN — ASPIRIN 81 MG: 81 TABLET, CHEWABLE ORAL at 08:59

## 2022-01-01 RX ADMIN — Medication 2000 MG: at 15:26

## 2022-01-01 RX ADMIN — PETROLATUM: 420 OINTMENT TOPICAL at 20:09

## 2022-01-01 RX ADMIN — PETROLATUM: 420 OINTMENT TOPICAL at 10:39

## 2022-01-01 RX ADMIN — MORPHINE SULFATE 2 MG: 2 INJECTION, SOLUTION INTRAMUSCULAR; INTRAVENOUS at 16:05

## 2022-01-01 RX ADMIN — PETROLATUM: 420 OINTMENT TOPICAL at 20:43

## 2022-01-01 RX ADMIN — MORPHINE SULFATE 2 MG: 2 INJECTION, SOLUTION INTRAMUSCULAR; INTRAVENOUS at 05:52

## 2022-01-01 RX ADMIN — MAGNESIUM HYDROXIDE 30 ML: 400 SUSPENSION ORAL at 08:05

## 2022-01-01 RX ADMIN — MAGNESIUM HYDROXIDE 30 ML: 400 SUSPENSION ORAL at 10:40

## 2022-01-01 RX ADMIN — DOCUSATE SODIUM 50 MG AND SENNOSIDES 8.6 MG 1 TABLET: 8.6; 5 TABLET, FILM COATED ORAL at 20:32

## 2022-01-01 RX ADMIN — ENOXAPARIN SODIUM 40 MG: 100 INJECTION SUBCUTANEOUS at 17:04

## 2022-01-01 RX ADMIN — PANTOPRAZOLE SODIUM 40 MG: 40 TABLET, DELAYED RELEASE ORAL at 08:02

## 2022-01-01 RX ADMIN — ASPIRIN 81 MG: 81 TABLET, CHEWABLE ORAL at 08:02

## 2022-01-01 RX ADMIN — ASPIRIN 81 MG: 81 TABLET, CHEWABLE ORAL at 08:24

## 2022-01-01 RX ADMIN — FERROUS SULFATE TAB 325 MG (65 MG ELEMENTAL FE) 325 MG: 325 (65 FE) TAB at 08:05

## 2022-01-01 RX ADMIN — POTASSIUM CHLORIDE 40 MEQ: 20 TABLET, EXTENDED RELEASE ORAL at 11:21

## 2022-01-01 RX ADMIN — INSULIN GLARGINE 30 UNITS: 100 INJECTION, SOLUTION SUBCUTANEOUS at 20:42

## 2022-01-01 RX ADMIN — INSULIN LISPRO 4 UNITS: 100 INJECTION, SOLUTION INTRAVENOUS; SUBCUTANEOUS at 20:32

## 2022-01-01 RX ADMIN — BENZONATATE 100 MG: 100 CAPSULE ORAL at 10:40

## 2022-01-01 RX ADMIN — LORAZEPAM 0.5 MG: 2 INJECTION INTRAMUSCULAR; INTRAVENOUS at 03:25

## 2022-01-01 RX ADMIN — Medication 2000 MG: at 11:15

## 2022-01-01 RX ADMIN — INSULIN LISPRO 6 UNITS: 100 INJECTION, SOLUTION INTRAVENOUS; SUBCUTANEOUS at 06:37

## 2022-01-01 RX ADMIN — DOCUSATE SODIUM 50 MG AND SENNOSIDES 8.6 MG 1 TABLET: 8.6; 5 TABLET, FILM COATED ORAL at 20:10

## 2022-01-01 RX ADMIN — FERROUS SULFATE TAB 325 MG (65 MG ELEMENTAL FE) 325 MG: 325 (65 FE) TAB at 08:21

## 2022-01-01 RX ADMIN — INSULIN GLARGINE 30 UNITS: 100 INJECTION, SOLUTION SUBCUTANEOUS at 20:43

## 2022-01-01 RX ADMIN — ENOXAPARIN SODIUM 70 MG: 100 INJECTION SUBCUTANEOUS at 15:00

## 2022-01-01 RX ADMIN — NYSTATIN 500000 UNITS: 500000 SUSPENSION ORAL at 17:06

## 2022-01-01 RX ADMIN — Medication: at 20:22

## 2022-01-01 RX ADMIN — INSULIN LISPRO 12 UNITS: 100 INJECTION, SOLUTION INTRAVENOUS; SUBCUTANEOUS at 16:11

## 2022-01-01 RX ADMIN — INSULIN LISPRO 2 UNITS: 100 INJECTION, SOLUTION INTRAVENOUS; SUBCUTANEOUS at 16:25

## 2022-01-01 RX ADMIN — Medication 10 ML: at 19:52

## 2022-01-01 RX ADMIN — ACETAMINOPHEN 650 MG: 325 TABLET ORAL at 04:00

## 2022-01-01 RX ADMIN — SODIUM CHLORIDE 1000 ML: 9 INJECTION, SOLUTION INTRAVENOUS at 22:15

## 2022-01-01 RX ADMIN — DRONABINOL 2.5 MG: 2.5 CAPSULE ORAL at 20:09

## 2022-01-01 RX ADMIN — FLUOXETINE 20 MG: 20 CAPSULE ORAL at 08:15

## 2022-01-01 RX ADMIN — LEVOTHYROXINE SODIUM 50 MCG: 50 TABLET ORAL at 05:11

## 2022-01-01 RX ADMIN — NYSTATIN 500000 UNITS: 500000 SUSPENSION ORAL at 20:24

## 2022-01-01 RX ADMIN — MAGNESIUM HYDROXIDE 30 ML: 400 SUSPENSION ORAL at 08:03

## 2022-01-01 RX ADMIN — NYSTATIN 500000 UNITS: 500000 SUSPENSION ORAL at 20:43

## 2022-01-01 RX ADMIN — Medication 10 ML: at 20:20

## 2022-01-01 RX ADMIN — PANTOPRAZOLE SODIUM 40 MG: 40 TABLET, DELAYED RELEASE ORAL at 05:55

## 2022-01-01 ASSESSMENT — PAIN SCALES - GENERAL
PAINLEVEL_OUTOF10: 0
PAINLEVEL_OUTOF10: 7
PAINLEVEL_OUTOF10: 5
PAINLEVEL_OUTOF10: 0
PAINLEVEL_OUTOF10: 4
PAINLEVEL_OUTOF10: 4
PAINLEVEL_OUTOF10: 5
PAINLEVEL_OUTOF10: 0
PAINLEVEL_OUTOF10: 0
PAINLEVEL_OUTOF10: 6
PAINLEVEL_OUTOF10: 0
PAINLEVEL_OUTOF10: 4
PAINLEVEL_OUTOF10: 6
PAINLEVEL_OUTOF10: 4
PAINLEVEL_OUTOF10: 4
PAINLEVEL_OUTOF10: 2
PAINLEVEL_OUTOF10: 0
PAINLEVEL_OUTOF10: 4
PAINLEVEL_OUTOF10: 1
PAINLEVEL_OUTOF10: 0
PAINLEVEL_OUTOF10: 1
PAINLEVEL_OUTOF10: 4
PAINLEVEL_OUTOF10: 2
PAINLEVEL_OUTOF10: 3
PAINLEVEL_OUTOF10: 6
PAINLEVEL_OUTOF10: 3
PAINLEVEL_OUTOF10: 0
PAINLEVEL_OUTOF10: 0
PAINLEVEL_OUTOF10: 1
PAINLEVEL_OUTOF10: 0
PAINLEVEL_OUTOF10: 0
PAINLEVEL_OUTOF10: 8
PAINLEVEL_OUTOF10: 4
PAINLEVEL_OUTOF10: 5
PAINLEVEL_OUTOF10: 5

## 2022-01-01 ASSESSMENT — ENCOUNTER SYMPTOMS
RHINORRHEA: 0
SHORTNESS OF BREATH: 1
DIARRHEA: 0
BACK PAIN: 0
CONSTIPATION: 0
VOMITING: 0
APNEA: 0
SORE THROAT: 0
NAUSEA: 0
EYE REDNESS: 0
TROUBLE SWALLOWING: 0
COUGH: 1
WHEEZING: 0
COUGH: 0
GASTROINTESTINAL NEGATIVE: 1
ABDOMINAL PAIN: 0
PHOTOPHOBIA: 0
ALLERGIC/IMMUNOLOGIC NEGATIVE: 1
CHEST TIGHTNESS: 0
EYE PAIN: 0
SHORTNESS OF BREATH: 1

## 2022-01-01 ASSESSMENT — PATIENT HEALTH QUESTIONNAIRE - PHQ9
SUM OF ALL RESPONSES TO PHQ9 QUESTIONS 1 & 2: 2
SUM OF ALL RESPONSES TO PHQ QUESTIONS 1-9: 2
1. LITTLE INTEREST OR PLEASURE IN DOING THINGS: 1
SUM OF ALL RESPONSES TO PHQ QUESTIONS 1-9: 2
2. FEELING DOWN, DEPRESSED OR HOPELESS: 1

## 2022-01-01 ASSESSMENT — LIFESTYLE VARIABLES: HOW OFTEN DO YOU HAVE A DRINK CONTAINING ALCOHOL: MONTHLY OR LESS

## 2022-03-07 NOTE — PATIENT INSTRUCTIONS
Personalized Preventive Plan for Jose De Jesus  - 3/7/2022  Medicare offers a range of preventive health benefits. Some of the tests and screenings are paid in full while other may be subject to a deductible, co-insurance, and/or copay. Some of these benefits include a comprehensive review of your medical history including lifestyle, illnesses that may run in your family, and various assessments and screenings as appropriate. After reviewing your medical record and screening and assessments performed today your provider may have ordered immunizations, labs, imaging, and/or referrals for you. A list of these orders (if applicable) as well as your Preventive Care list are included within your After Visit Summary for your review. Other Preventive Recommendations:    · A preventive eye exam performed by an eye specialist is recommended every 1-2 years to screen for glaucoma; cataracts, macular degeneration, and other eye disorders. · A preventive dental visit is recommended every 6 months. · Try to get at least 150 minutes of exercise per week or 10,000 steps per day on a pedometer . · Order or download the FREE \"Exercise & Physical Activity: Your Everyday Guide\" from [employer]. Call 3-630.185.1384 or search [employer] online. · You need 2333-7234 mg of calcium and 1017-3963 IU of vitamin D per day. It is possible to meet your calcium requirement with diet alone, but a vitamin D supplement is usually necessary to meet this goal.  · When exposed to the sun, use a sunscreen that protects against both UVA and UVB radiation with an SPF of 30 or greater. Reapply every 2 to 3 hours or after sweating, drying off with a towel, or swimming. · Always wear a seat belt when traveling in a car. Always wear a helmet when riding a bicycle or motorcycle.

## 2022-03-07 NOTE — PROGRESS NOTES
Medicare Annual Wellness Visit    Riri Andrews is here for Medicare AWV, Hypertension, and Discuss Medications    Assessment & Plan   Ruby Krause was seen today for medicare awv, hypertension and discuss medications. Diagnoses and all orders for this visit:    Medicare annual wellness visit, subsequent    Anxiety  -     LORazepam (ATIVAN) 0.5 MG tablet; Take 1 tablet by mouth 2 times daily as needed for Anxiety for up to 30 days. Recommendations for Preventive Services Due: see orders and patient instructions/AVS.  Recommended screening schedule for the next 5-10 years is provided to the patient in written form: see Patient Instructions/AVS.     Return for Medicare Annual Wellness Visit in 1 year. Subjective   Going through chemo for lung cancer  C/o anxiety    Patient's complete Health Risk Assessment and screening values have been reviewed and are found in Flowsheets. The following problems were reviewed today and where indicated follow up appointments were made and/or referrals ordered.     Positive Risk Factor Screenings with Interventions:    Fall Risk:  Do you feel unsteady or are you worried about falling? : (!) yes  2 or more falls in past year?: (!) yes  Fall with injury in past year?: no     Fall Risk Interventions:    · Home safety tips provided              General Health and ACP:  General  In general, how would you say your health is?: Fair  In the past 7 days, have you experienced any of the following: New or Increased Pain, New or Increased Fatigue, Loneliness, Social Isolation, Stress or Anger?: (!) Yes  Select all that apply: (!) New or Increased Pain,New or Increased Fatigue  Do you get the social and emotional support that you need?: Yes  Do you have a Living Will?: Yes    Advance Directives     Power of  Living Will ACP-Advance Directive ACP-Power of     Not on File Filed on 02/14/19 Filed Not on File      General Health Risk Interventions:  · Fatigue: regular exercise recommended- 3-5 times per week, 30-45 minutes per session    Health Habits/Nutrition:     Physical Activity: Inactive    Days of Exercise per Week: 0 days    Minutes of Exercise per Session: 0 min     Have you lost any weight without trying in the past 3 months?: (!) Yes     Have you seen the dentist within the past year?: N/A - wear dentures    Health Habits/Nutrition Interventions:  · Inadequate physical activity:  patient agrees to exercise for at least 150 minutes/week      ADLs:  In the past 7 days, did you need help from others to perform any of the following everyday activities: Eating, dressing, grooming, bathing, toileting, or walking/balance?: (!) Yes  Select all that apply: (!) Dressing,Eating,Bathing,Grooming,Toileting,Walking/Balance  In the past 7 days, did you need help from others to take care of any of the following: Laundry, housekeeping, banking/finances, shopping, telephone use, food preparation, transportation, or taking medications?: (!) Yes  Select all that apply: (!) Laundry,Housekeeping,Banking/Finances,Shopping,Telephone Use,Food Preparation,Transportation,Taking Medications    ADL Interventions:  · Patient declines any further evaluation/treatment for this issue          Objective   Vitals:    03/07/22 1344   BP: 124/76   Pulse: 112   Resp: 18   Temp: 97.3 °F (36.3 °C)   SpO2: 93%   Height: 5' 10\" (1.778 m)      Body mass index is 26.83 kg/m².         General Appearance: alert and oriented to person, place and time, well developed and well- nourished, in no acute distress  Skin: warm and dry, no rash or erythema  Head: normocephalic and atraumatic  Eyes: pupils equal, round, and reactive to light, extraocular eye movements intact, conjunctivae normal  ENT: tympanic membrane, external ear and ear canal normal bilaterally, nose without deformity, nasal mucosa and turbinates normal without polyps  Neck: supple and non-tender without mass, no thyromegaly or thyroid nodules, no cervical lymphadenopathy  Pulmonary/Chest: clear to auscultation bilaterally- no wheezes, rales or rhonchi, normal air movement, no respiratory distress  Cardiovascular: normal rate, regular rhythm, normal S1 and S2, no murmurs, rubs, clicks, or gallops, distal pulses intact, no carotid bruits  Abdomen: soft, non-tender, non-distended, normal bowel sounds, no masses or organomegaly  Extremities: no cyanosis, clubbing rito lower ext edema  Musculoskeletal: normal range of motion, no joint swelling, deformity or tenderness  Neurologic: reflexes normal and symmetric, no cranial nerve deficit, gait, coordination and speech normal     anxious mood  No Known Allergies  Prior to Visit Medications    Medication Sig Taking? Authorizing Provider   LORazepam (ATIVAN) 0.5 MG tablet Take 1 tablet by mouth 2 times daily as needed for Anxiety for up to 30 days.  Yes Rohan Lam,    FLUoxetine (PROZAC) 10 MG capsule TAKE ONE CAPSULE BY MOUTH DAILY  Rohan Lam DO   folic acid (FOLVITE) 1 MG tablet Take 1 mg by mouth daily  Historical Provider, MD   diphenhydrAMINE-APAP, sleep, (TYLENOL PM EXTRA STRENGTH)  MG tablet Take 1 tablet by mouth nightly as needed for Sleep  Historical Provider, MD   Coenzyme Q10 (CO Q 10 PO) Take 200 mg by mouth 2 times daily Ld 10/21/2019  Historical Provider, MD   MICROLET LANCETS MISC TEST 4 [address] Provider, MD   CONTOUR NEXT TEST strip TEST FOUR TIMES DAILY  Historical Provider, MD   insulin lispro (HUMALOG KWIKPEN) 200 UNIT/ML SOPN pen Inject into the skin 3 times daily (with meals)  Historical Provider, MD   insulin glargine (BASAGLAR KWIKPEN) 100 UNIT/ML injection pen Inject 44 Units into the skin nightly Pt instructed to take 1/2 evening dose night before bronchoscopy  Historical Provider, MD   atorvastatin (LIPITOR) 80 MG tablet Take 80 mg by mouth nightly   Historical Provider, MD   ammonium lactate (AMLACTIN) 12 % cream Apply topically as needed (feet)   Historical Provider, MD   levothyroxine (SYNTHROID) 25 MCG tablet Take 25 mcg by mouth Daily   Historical Provider, MD   aspirin 81 MG tablet Take 81 mg by mouth daily 5 day hold per dr. Ck Leigh Provider, MD Kidd (Including outside providers/suppliers regularly involved in providing care):   Patient Care Team:  [employer], DO as PCP - General  [facility]  as PCP - [facility] Empaneled Provider  Silvina Zheng MD as Consulting Physician (Pulmonology)    Reviewed and updated this visit:  Tobacco  Allergies  Meds  Problems  Med Hx  Surg Hx  Soc Hx  Fam Hx

## 2022-03-14 PROBLEM — I21.4 NSTEMI (NON-ST ELEVATED MYOCARDIAL INFARCTION) (HCC): Status: ACTIVE | Noted: 2022-01-01

## 2022-03-14 PROBLEM — I21.4 NSTEMI, INITIAL EPISODE OF CARE (HCC): Status: ACTIVE | Noted: 2022-01-01

## 2022-03-14 NOTE — PROGRESS NOTES
Admission database completed to best of this RN's ability. Care plan and education initiated. Pt from home with wife. Needs assistance with most ADLs. Was using cane with ambulation at baseline, but d/t increasing weakness now requires WC. Being treated through the [facility] for stage 4 lung CA. Last chemo was 2 weeks ago and next chemo is 3/12/2022. Denies any [place] [id number] services prior to admission.

## 2022-03-14 NOTE — PROGRESS NOTES
Called Dr. Alice Bolanos regarding insulin orders, she states she will take a look at it and make changes if needed.

## 2022-03-14 NOTE — CONSULTS
Inpatient Cardiology Consultation      Reason for Consult:  NSTEMI     Consulting Physician: Dr. Jane Aguero    Requesting Physician:  Dr. Colette Calderon    Date of Consultation: 3/14/2022    HISTORY OF PRESENT ILLNESS:   Mr. Sully Hollis is an 79year old  male who is previously not known to [facility]) Cardiology Physicians in [facility]. His medical history as stated below. Mr. Sully Hollis presented to [facility] ED on 03/14/2022 with complaints of worsening edema to BLE over the course of the past month or greater. He denies accompanying chest pain, MENDEZ, orthopnea or PND. Upon arrival to the ED his VS were oral temperature 97.5-367-/74-96% on RA. EKG ST.  WBC 7.6. H&H 9.2/30. Rapid Covid test negative. BUN/SCR 20/0.9. Albumin 2.8. Troponin of 297 and 261. proBNP 354. He received  mg, Lasix 40 mg IV, and K-Dur 40 mEq. Cardiology was consulted for further management of NSTEMI. Currently, Mr. Sully Hollis is lying most completely flat in bed. Denies chest pain and dyspnea. He complains of fatigue and falls asleep easily during interview. His sister is present at the bedside. Please note: past medical records were reviewed per electronic medical record (EMR) - see detailed reports under Past Medical/ Surgical History. Past Medical and Surgical History:   1. HTN  2. HLD  3. DM  4. Peripheral neuropathy   5. Remote tobacco abuse   6. Depression   7. Hypothyroidism  8. Prostate CA s/p radiation   9. Stage IV Lung CA (diagnosed 28 months ago per patient sister and has been on several various chemo agents). S/p radiation. Due to to receive Gemzar next week  10. S/p Appendectomy, cataract removal with lens implant, vitrectomy, and full mouth dental extractions. Medications Prior to admit:  Prior to Admission medications    Medication Sig Start Date End Date Taking?  Authorizing Provider   LORazepam (ATIVAN) 0.5 MG tablet Take 1 tablet by mouth 2 times daily as needed for Anxiety for up to 30 days. 3/7/22 4/6/22  Gerardo Bolanos, DO   FLUoxetine (PROZAC) 10 MG capsule TAKE ONE CAPSULE BY MOUTH DAILY 1/31/22   Gerardo Bolanos, DO   folic acid (FOLVITE) 1 MG tablet Take 1 mg by mouth daily    Historical Provider, MD   diphenhydrAMINE-APAP, sleep, (TYLENOL PM EXTRA STRENGTH)  MG tablet Take 1 tablet by mouth nightly as needed for Sleep    Historical Provider, MD   Coenzyme Q10 (CO Q 10 PO) Take 200 mg by mouth 2 times daily Ld 10/21/2019    Historical Provider, MD   Parmova 112 TEST 4 TIMES DAILY 9/10/19   Historical Provider, MD   CONTOUR NEXT TEST strip TEST FOUR TIMES DAILY 9/10/19   Historical Provider, MD   insulin lispro (HUMALOG KWIKPEN) 200 UNIT/ML SOPN pen Inject into the skin 3 times daily (with meals)    Historical Provider, MD   insulin glargine (BASAGLAR KWIKPEN) 100 UNIT/ML injection pen Inject 44 Units into the skin nightly Pt instructed to take 1/2 evening dose night before bronchoscopy    Historical Provider, MD   atorvastatin (LIPITOR) 80 MG tablet Take 80 mg by mouth nightly     Historical Provider, MD   ammonium lactate (AMLACTIN) 12 % cream Apply topically as needed (feet)  8/29/16   Historical Provider, MD   levothyroxine (SYNTHROID) 25 MCG tablet Take 25 mcg by mouth Daily  9/15/16   Historical Provider, MD   aspirin 81 MG tablet Take 81 mg by mouth daily 5 day hold per dr. Zarco Shown Provider, MD       Current Medications:    No current facility-administered medications for this encounter. Allergies:  Patient has no known allergies. Social History:    Smokes socially as a teenager only. Drinks 1 shot of whiskey a day. Denies illicit drug use. Caffeine intake includes a cup of coffee daily. Family History:   Please note this information was not obtained at this time, as it is limited in nature due to the patient's advanced age. REVIEW OF SYSTEMS:     Please see HPI.   Further ROS not able to be obtained as the patient is lethargic and not answering further questions. PHYSICAL EXAM:   BP (!) 111/59   Pulse 90   Temp 97.8 °F (36.6 °C) (Oral)   Resp 20   Ht 5' 10\" (1.778 m)   Wt 180 lb (81.6 kg)   SpO2 92%   BMI 25.83 kg/m²   CONST:  Well developed, well nourished elderly  male who appears younger than his stated age. Awake, alert, cooperative, no apparent distress  HEENT:   Head- Normocephalic, atraumatic   Eyes- Conjunctivae pink, anicteric  Throat- Oral mucosa pink and moist  Neck-  No stridor, trachea midline, no jugular venous distention. No adenopathy   CHEST: Chest symmetrical and non-tender to palpation. No accessory muscle use or intercostal retractions  RESPIRATORY: Lung sounds - clear throughout anterior and lateral fields   CARDIOVASCULAR:     No carotid bruit  Heart Inspection- shows no noted pulsations  Heart Palpation- no heaves or thrills; PMI is non-displaced   Heart Ausculation- Regular rate and rhythm, no murmur. No s3, s4 or rub   PV: 2+ pitting bilateral lower extremity edema. No varicosities. Pedal pulses palpable, no clubbing or cyanosis. Erythema noted to BLE with right greater than left  ABDOMEN: Soft, non-tender to light palpation. Bowel sounds present. No palpable masses no organomegaly; no abdominal bruit  MS: Good muscle strength and tone. No atrophy or abnormal movements. : Deferred  SKIN: Warm and dry no statis dermatitis or ulcers   NEURO / PSYCH: Oriented to person, place and time. Speech clear and appropriate. Follows all commands. Flat affect.   Lethargic    DATA:    ECG: As above  Tele strips: SR  Diagnostic:  Labs:   CBC:   Recent Labs     03/14/22  1025   WBC 7.6   HGB 9.2*   HCT 30.0*        BMP:   Recent Labs     03/14/22  1025      K 3.4*   CO2 32*   BUN 20   CREATININE 0.9   LABGLOM >60   CALCIUM 8.3*     Mag:   Recent Labs     03/14/22  1025   MG 2.1   PT/INR:   Recent Labs     03/14/22  1225   PROTIME 15.3*   INR 1.4     APTT:  Recent Labs     03/14/22  1225   APTT 26.5 FASTING LIPID PANEL:  Lab Results   Component Value Date    CHOL 120 02/17/2020    HDL 41 02/17/2020    LDLCALC 63 02/17/2020    TRIG 77 02/17/2020     LIVER PROFILE:  Recent Labs     03/14/22  1025   AST 23   ALT 19   LABALBU 2.8*     Results for Peter Crowe (MRN [id number]) as of 3/14/2022 14:53   Ref. Range 3/14/2022 10:25 3/14/2022 12:25   Troponin, High Sensitivity Latest Ref Range: 0 - 11 ng/L 297 (H) 261 (H)     03/14/2022 CXR:   Left lung tumor mass with new left lower lobe collapse and possible pleural  Effusion. 03/14/2022 Pulmonary CTA:   1.  No evidence of pulmonary embolism  2.  Significant interval increase in size of left lung suprahilar and hilar  masses. Darrick Mention is a large confluent bilobed mass now measuring approximately 7.8 x 6.4 x 9.8 cm. 3.  Mild fusiform narrowing of the left pulmonary artery.  No pulmonary  artery occlusion or filling defect. 4.  Atelectasis of the left lower lobe with compressive occlusion of the left  lower lobe bronchus.  Small left effusion. 5.  Lobular metastasis involving the left adrenal gland measuring 3.2 x 3.1  cm on axial images.  Mildly increased in size compared to 01/21/2022.    03/14/2022 BLE Ultrasound: Pending     IMPRESSION and PLAN to follow per Dr. Nora Funez    Electronically signed by Tedd Gottron, APRN - CNP on 3/14/2022 at 2:48 PM     The above documentation has been prepared under my direction and personally reviewed by me in its entirety. I confirm that the note above accurately reflects all work, treatment, procedures, and medical decision making performed by me. The patient's history was independently obtained. The patient was independently examined. Electrocardiogram, prior and present cardiovascular assessment, and laboratory studies were reviewed. The patient is an 48-year-old white male with no association to [employer] and no prior documented structural heart disease.   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 He presents following the development of progressive lower extremity edema predominately of his right lower extremity within the past month associated with progressive weakness and debilitation. On occasion, he will noted a somewhat ill-defined discomfort of his left chest occasionally radiating to his jaw which he attributes to his carcinoma and most recently noted the symptoms within the past week following his most recent chemotherapy. On the basis of his progressive debilitation, he presented to the emergency room where a resting electrocardiogram reviewed at the time of evaluation demonstrated evidence of sinus tachycardia with a somewhat early precordial transition zone and minor nonspecific ST changes. A chest x-ray again reviewed demonstrated evidence of progressive left upper and lower lobe volume loss with a contrast CT angiogram excluding the presence of a pulmonary embolism with evidence of progressive left upper lobe and hilar masses with narrowing of the left pulmonary artery without evidence of pulmonary emboli as well as progressive atelectasis of the left lower lobe with occlusion of the left lower lobe bronchus and reported slightly increased sizes of adrenal metastasis. Additional laboratory studies demonstrated elevated elution of troponin levels in the face of a normal CK level and in the absence of the ability to obtain CK MB levels as well as the presence of mild prerenal azotemia and hypoalbuminemia a proBNP level of 355 pg/mL. At the time of evaluation, he specifically denies present chest discomfort or dyspnea.     At time of evaluation, the patient's medications and allergies were reviewed as well as that of his past medical history and review of systems as documented. On examination, the patient is awake and alert and appears chronically ill and somewhat debilitated. He is hemodynamically stable with vital signs as documented. Jugular venous pressure is normal with no identified carotid bruits. Lung fields are clear to auscultation with diminished breath sounds throughout the left lung. Cardiac examination still very regular rate and rhythm with no gallop rhythm or cardiac murmur. A benign abdominal examination is present and mild pretibial edema is present more prominently noted involving his right than left lower extremity. Diagnostic Assessment and Plan: On a clinical basis, the patient presents with progressive metastatic stage IV adenocarcinoma of the lung with most recent CT evidence of progressive disease in spite of his most recent chemotherapy. His high-sensitivity troponin levels while elevated in the absence of present clinical symptomatology nor that of electrocardiographic findings do not suggest the presence of an acute coronary syndrome. From review of his CT findings, no clear evidence of pericardial involvement is noted. Presently, conservative management from a cardiovascular standpoint is indicated with the majority of care dictated by the oncology service and further consideration of involvement of palliative care to assist family in additional advanced care directives. At the time of his present assessment, a preliminary discussion was held with he as well as his sister who was present time of assessment with the patient's expressed wishes of the absence of resuscitative efforts if decompensation were to occur. Continued nutritional support will be essential to fluid mobilization with nutritional deficiency likely contributing to his lower extremity edema.   No additional cardiovascular assessment is presently anticipated in view of his guarded prognosis. Thank you for allowing me to participate in your patient's care. Please feel free to contact me if you have any questions or concerns. Christian Menjivar.  Paris Flores, [id number] [facility]

## 2022-03-14 NOTE — H&P
[address] [facility]   History and Physical      CHIEF COMPLAINT: Generalized weakness/increased shortness of breath. History of Present Illness:  79 y.o. male with a history of DM, depression, HLD, HTN, hypothyroidism, peripheral neuropathy, prostate CA s/p radiation, and stage IV lung CA  presents to [facility] ED with increased shortness of breath, generalized weakness, and worsening BLE edema. Patient complaint is moderate in severity, persistent, worsened with exertion. Patient states he is compliant to prescribed furosemide 40 mg twice daily. Patient having significant fatigue and generalized weakness noted difficulty getting out of bathroom 2/2 SOB and fatigue. EMS called. Sister at bedside states attributing prednisone use to BLE swelling. Developed redness to RLE about 2 weeks ago and has frequently worsened. Per sister patient received chemotherapy approximately 3 weeks ago. States he will be due about Wednesday this week. Does drink some whiskey has a history of smoking. Patient denies fevers, chills, N/V/D, and CP. K+ 3.4. BUN/Crt 20/0.9 calcium 8.3 total CK 48 proBNP 354 HS troponin 297>261. WBC 7.6 H/H 9.2/30.0. Albumin 2.8 HS troponin 297>261. CTA of chest no PE. Significant interval increase in left lung suprahilar and hilar masses. Large confluent bilobed mass measuring 7.8 x 6.4 x 9.8 cm. Atelectasis LLL compressive occlusion of the left lower lobe bronchus. Lobular metastasis involving left adrenal gland 3.2 x 3.1 cm. Mildly increased from previous exam.  Patient received ASA/furosemide/potassium in ED course. Patient will be admitted for further evaluation and treatment. Informant(s) for H&P: Patient, sister, and EMR    REVIEW OF SYSTEMS:  Increase shortness of breath, generalized weakness, and worsening BLE edema.   Denies  fevers, chills, cp, n/v, ha, vision/hearing changes, wt changes, hot/cold flashes, other open skin lesions, diarrhea, constipation, dysuria/hematuria unless noted in HPI. Complete ROS performed with the patient and is otherwise negative. PMH:  Past Medical History:   Diagnosis Date    Diabetes mellitus without complication ([address]     Epiretinal membrane, left eye 10/2019    Full dentures     History of depression     Hyperlipidemia     Hypertension     Hypothyroidism     Peripheral neuropathy     feet, affects balance at times    Prostate cancer [facility])     treated with radiation    Use of cane as ambulatory aid        Surgical History:  Past Surgical History:   Procedure Laterality Date    APPENDECTOMY      BRONCHOSCOPY N/A 10/24/2019    BRONCHOSCOPY WITH ANESTHESIA (CYTOLOGY NOTIFIED) performed by Silvina Zheng MD at [address] [place] [address] Bilateral     CIRCUMCISION      COLONOSCOPY      EYE SURGERY      eyelids    TOOTH EXTRACTION      full mouth    VITRECTOMY Left 2/14/2019    PARS PLANA VITRECTOMY 25 GAUGE MACULAR PUCKER REPAIR AIR FLUID EXCHANGE LEFT EYE performed by Brad Almanzar MD at [facility] VITRECTOMY Left 10/10/2019    PARS PLANA VITRECTOMY 25 GAUGE INTERNAL LIMITING MEMBRANE PEEL OZURDEX INJECTION  LEFT YE E performed by Brad Almanzar MD at [facility] OR       Medications Prior to Admission:    Prior to Admission medications    Medication Sig Start Date End Date Taking? Authorizing Provider   LORazepam (ATIVAN) 0.5 MG tablet Take 1 tablet by mouth 2 times daily as needed for Anxiety for up to 30 days.  3/7/22 4/6/22  Bianca Alon, DO   FLUoxetine (PROZAC) 10 MG capsule TAKE ONE CAPSULE BY MOUTH DAILY 1/31/22   Bianca Chi, DO   folic acid (FOLVITE) 1 MG tablet Take 1 mg by mouth daily    Historical Provider, MD   diphenhydrAMINE-APAP, sleep, (TYLENOL PM EXTRA STRENGTH)  MG tablet Take 1 tablet by mouth nightly as needed for Sleep    Historical Provider, MD   Coenzyme Q10 (CO Q 10 PO) Take 200 mg by mouth 2 times daily Ld 10/21/2019    Historical Provider, MD MICROLET LANCETS MISC TEST 4 TIMES DAILY 9/10/19   Historical Provider, MD   CONTOUR NEXT TEST strip TEST FOUR TIMES DAILY 9/10/19   Historical Provider, MD   insulin lispro (HUMALOG KWIKPEN) 200 UNIT/ML SOPN pen Inject into the skin 3 times daily (with meals)    Historical Provider, MD   insulin glargine (BASAGLAR KWIKPEN) 100 UNIT/ML injection pen Inject 44 Units into the skin nightly Pt instructed to take 1/2 evening dose night before bronchoscopy    Historical Provider, MD   atorvastatin (LIPITOR) 80 MG tablet Take 80 mg by mouth nightly     Historical Provider, MD   ammonium lactate (AMLACTIN) 12 % cream Apply topically as needed (feet)  8/29/16   Historical Provider, MD   levothyroxine (SYNTHROID) 25 MCG tablet Take 25 mcg by mouth Daily  9/15/16   Historical Provider, MD   aspirin 81 MG tablet Take 81 mg by mouth daily 5 day hold per dr. Porfirio Beltran Provider, MD       Allergies:    Patient has no known allergies. Social History:    reports that he quit smoking about 37 years ago. His smoking use included cigarettes. He has a 37.50 pack-year smoking history. He has never used smokeless tobacco. He reports current alcohol use. He reports that he does not use drugs. Family History:   family history is not on file.   Reviewed and updated with patient and sister    PHYSICAL EXAM:  Vitals:  BP (!) 111/59   Pulse 90   Temp 97.8 °F (36.6 °C) (Oral)   Resp 20   Ht 5' 10\" (1.778 m)   Wt 180 lb (81.6 kg)   SpO2 92%   BMI 25.83 kg/m²     General Appearance: alert and oriented to person, place and time and in no acute distress  Skin: warm and dry  Head: normocephalic and atraumatic  Eyes: pupils equal, round, and reactive to light, extraocular eye movements intact, conjunctivae normal  Neck: neck supple and non tender without mass   Pulmonary/Chest: Diminished Left to auscultation - no wheezes, rales or rhonchi, normal air movement, no respiratory distress  Cardiovascular: normal rate, normal S1 and S2 and no RGM  Abdomen: soft, non-tender, non-distended, normal bowel sounds  Extremities: no cyanosis, no clubbing and 2-3+ edema. Erythema warmth to RLE. Neurologic: no cranial nerve deficit and speech normal    LABS:  Recent Labs     03/14/22  1025      K 3.4*   CL 94*   CO2 32*   BUN 20   CREATININE 0.9   GLUCOSE 110*   CALCIUM 8.3*       Recent Labs     03/14/22  1025   WBC 7.6   RBC 3.33*   HGB 9.2*   HCT 30.0*   MCV 90.1   MCH 27.6   MCHC 30.7*   RDW 19.6*      MPV 9.4       No results for input(s): POCGLU in the last 72 hours. Radiology: XR CHEST PORTABLE    Result Date: 3/14/2022  EXAMINATION: ONE XRAY VIEW OF THE CHEST 3/14/2022 9:16 am COMPARISON: 12 January 2022 HISTORY: ORDERING SYSTEM PROVIDED HISTORY: shortness of breath TECHNOLOGIST PROVIDED HISTORY: Reason for exam:->shortness of breath FINDINGS: Implanted central venous catheter on the left as before. There is redemonstrated tumor mass in the left upper lung which has been previously demonstrated to extend into the mediastinum. There is now volume loss on the left compatible with substantial left lower lobe collapse in there may be pleural effusion as well. Left lung tumor mass with new left lower lobe collapse and possible pleural effusion. CTA PULMONARY W CONTRAST    Result Date: 3/14/2022  EXAMINATION: CTA OF THE CHEST 3/14/2022 11:48 am TECHNIQUE: CTA of the chest was performed after the administration of intravenous contrast.  Multiplanar reformatted images are provided for review. MIP images are provided for review. Dose modulation, iterative reconstruction, and/or weight based adjustment of the mA/kV was utilized to reduce the radiation dose to as low as reasonably achievable.  COMPARISON: 10/15/2021 HISTORY: ORDERING SYSTEM PROVIDED HISTORY: eval for pulmonary embolism; left pleural effusion; hx of lung cancer TECHNOLOGIST PROVIDED HISTORY: Reason for exam:->eval for pulmonary embolism; left pleural effusion; hx of lung cancer Decision Support Exception - unselect if not a suspected or confirmed emergency medical condition->Emergency Medical Condition (MA) FINDINGS: Pulmonary Arteries: Right pulmonary arteries are adequately opacified. No filling defects to suggest pulmonary embolism. The left main pulmonary artery is mildly attenuated by a left suprahilar and posterior hilar bilobed soft tissue mass which is increased significantly in size since the prior study. There are no filling defects in the left pulmonary artery and no evidence of pulmonary artery occlusion. Mediastinum: There is a large left suprahilar bilobed soft tissue mass but no evidence of focal mediastinal lymphadenopathy. No axillary adenopathy detected. Thyroid gland is normal.  Heart appears normal in size. Aorta is nonaneurysmal. Lungs/pleura: There is bronchial occlusion and fluid-filled bronchi with atelectasis involving the left lower lobe. There is a small left effusion. Elevation of the left hemidiaphragm is present. Right lung appears generally clear. No right-sided effusion. Minimal right medial middle lobe atelectasis. The left suprahilar mass measures 7.8 x 6.4 cm on axial image 69 series 302. The mass is contiguous with a posterior hilar soft tissue mass measuring 3.6 x 3.3 cm on image 116 series 302. Total vertical extent is 9.8 cm on coronal views. Upper Abdomen: New adrenal metastasis on the left measuring 3.2 x 3.1 cm on axial image 237 series 302. Unchanged left renal cortical cyst. Soft Tissues/Bones: No acute bone or soft tissue abnormality. 1.  No evidence of pulmonary embolism 2. Significant interval increasein  size of left lung suprahilar and hilar masses. There is a large confluent bilobed mass now measuring approximately 7.8 x 6.4 x 9.8 cm. 3.  Mild fusiform narrowing of the left pulmonary artery. No pulmonary artery occlusion or filling defect.  4.  Atelectasis of the left lower lobe with compressive occlusion of the left lower lobe bronchus. Small left effusion. 5.  Lobular metastasis involving the left adrenal gland measuring 3.2 x 3.1 cm on axial images. Mildly increased in size compared to 01/21/2022. EKG: Interpretation per ED physician: ST    ASSESSMENT:      Principal Problem:    NSTEMI (non-ST elevated myocardial infarction) ([facility] [address]  Resolved Problems:    * No resolved hospital problems. *      PLAN:    1. NSTEMI HS troponin [id number]. Total CK 48. EKG ST.  Denies CP. Cardiology input appreciated. 2. Increased SOB-2/2 #3? Increase shortness of breath over the last couple of weeks. Currently not requiring oxygen supplementation follows with . Consider consultation  3. BLE swelling. US negative DVT. 4. Recurrent stage IV poorly differentiated adenocarcinoma of ODALSI- follows with . Last seen 1/26/22. CT 1/21 with recurrent/progressive malignancy involving medial RUL, right suprahilar with 6.4 x 4.4 x 5 cm infrahilar region mass measuring 4.1 x 3.7 x 4.7 cm. Enhancing metastasis left adrenal gland 2.1 x 2.9 cm. CTA chest significant increase in left lung and suprahilar and hilar masses. Large confluent bilobed mass measuring 7.8 x 6.4 x 9.8 cm. Lobular metastasis involving left adrenal gland 3.7 x 3.1 cm. Atelectasis of LLL with compressive occlusion of LLL bronchus. Palliative medicine/hematology/oncology input appreciated. 5. HLDcontinue atorvastatin  6. DMA1c in a.m. Lantus/SSI/hypoglycemic protocol/carb controlled diet. 7. Peripheral neuropathy  8. Depressioncontinue fluoxetine/lorazepam as needed. 9. Hypothyroidismcontinue levothyroxine  10. HypoalbuminemiaAlbumin 2.8. Dietary supplementation. Dietitian for recommendation. 11. AnemiaH/H 9.2/30.0. No overt signs of bleeding noted. Check iron panel. Continue to follow closely transfuse as needed. Code Status: Full  DVT prophylaxis: Lovenox.      NOTE: This report was transcribed using voice recognition software. Every effort was made to ensure accuracy; however, inadvertent computerized transcription errors may be present.     Electronically signed by Lorella Schilder. Charles Public - CNP on 3/14/2022 at 2:34 PM    Addendum: I have personally participated in the history, exam, medical decision making with Pilo Dumont on the date of service and I agree with all of the pertinent clinical information unless otherwise noted. I have also reviewed and agree with the past medical, family, and social history unless otherwise noted. Patient was admitted with LE swelling    PHYSICAL EXAM:  Vitals:  /66   Pulse 91   Temp 98.2 °F (36.8 °C) (Oral)   Resp 18   Ht 5' 10\" (1.778 m)   Wt 174 lb 9.7 oz (79.2 kg)   SpO2 91%   BMI 25.05 kg/m²   Gen: awake, alert, NAD  Lungs: clear to auscultation bilaterally no crackles no wheezing. Heart: RRR, no murmur   Abdomen: soft nontender nondistended positive bowel sounds. Extremities: full range of motion no peripheral edema. Impression:  Principal Problem:    NSTEMI (non-ST elevated myocardial infarction) [facility])  Active Problems:    NSTEMI, initial episode of care [facility])  Resolved Problems:    * No resolved hospital problems. *      My findings/plan include:    79year old male with PMH of DM, depression, HLD, HTN, hypothyroidism, peripheral neuropathy, prostate CA s/p radiation, and stage IV lung CA presents with shortness of breath and lower extremity swelling, R>L. ProBNP is low. Venous US negative for DVT. RLE appears to be erythematous. Will treat for cellulitis. Futhermore there could be a component of pulmonary hypertension causing some symptoms as CTA chest indicates narrowing of pulmonary artery. Will also diurese. Patient was seen by cardiology for NSTEMI. Will continue to follow up with their recommendations. Oncology has been consulted for new CT chest findings. NOTE: This report was transcribed using voice recognition software.  Every effort was made to ensure accuracy; however, inadvertent computerized transcription errors may be present.     Electronically signed by Kaden Calderon DO on 3/14/2022 at 5:48 PM

## 2022-03-14 NOTE — ED PROVIDER NOTES
[address]      Pt Name: Triston Blancas  MRN: [id number]  [place] 1940  Date of evaluation: 3/14/2022      CHIEF COMPLAINT       Chief Complaint   Patient presents with    Leg Swelling     pt arrives with ems c/o bilateral lower extremity edema. 4+pitting edema to bilateral legs. pt is stage 4 lung cancer hx     Other     pt and wife concerned for pt abd swelling.  Shortness of Breath     pt reports sob, spo2 95-97 % on room air but feels as thought he is sob.  does not wear o2 at home. HPI  Triston Blancas is a 79 y.o. male with history of hypertension, diabetes, hyperlipidemia who presents from home with history of stage IV lung cancer with worsening bilateral lower extremity edema and shortness of breath and generalized weakness. Patient states that for the last week or so has had increased bilateral lower extremity swelling. He states he continues to take Lasix 40 mg twice daily and has not missed any doses. He denies any chest pain. He states that his wife called the ambulance today after he was having trouble even getting out of the bathroom due to fatigue and shortness of breath. He denies orthopnea. He is not on any blood thinners. He denies any chest pain. He last got chemotherapy 1 week ago at the [facility]. He denies any his head or any injury or fall. Review of Systems   Constitutional: Positive for fatigue. Negative for activity change, chills, diaphoresis and fever. HENT: Negative for rhinorrhea, sore throat and trouble swallowing. Eyes: Negative for photophobia, pain and redness. Respiratory: Positive for shortness of breath. Negative for apnea, cough, chest tightness and wheezing. Cardiovascular: Positive for leg swelling. Negative for chest pain and palpitations. Gastrointestinal: Negative for abdominal pain, constipation, diarrhea, nausea and vomiting. Endocrine: Negative for polyuria.    Genitourinary: Negative for difficulty urinating and dysuria. Musculoskeletal: Negative for back pain, neck pain and neck stiffness. Skin: Negative for pallor and rash. Neurological: Negative for dizziness, syncope, weakness, light-headedness and numbness. Psychiatric/Behavioral: Negative for confusion. The patient is not nervous/anxious. Physical Exam  Vitals and nursing note reviewed. Constitutional:       General: He is not in acute distress. Appearance: He is well-developed. Comments: Awake and alert. Sitting in the gurney in no obvious distress. HENT:      Head: Normocephalic and atraumatic. Mouth/Throat:      Mouth: Mucous membranes are moist.      Pharynx: No oropharyngeal exudate. Eyes:      General: No scleral icterus. Pupils: Pupils are equal, round, and reactive to light. Cardiovascular:      Rate and Rhythm: Normal rate and regular rhythm. Heart sounds: Normal heart sounds. No murmur heard. Comments: 2+ radial and dorsal pedis pulses bilaterally  Pulmonary:      Effort: Pulmonary effort is normal. No respiratory distress. Breath sounds: Normal breath sounds. No wheezing. Abdominal:      Palpations: Abdomen is soft. Tenderness: There is no abdominal tenderness. There is no guarding or rebound. Musculoskeletal:         General: No tenderness or deformity. Normal range of motion. Cervical back: Normal range of motion and neck supple. Right lower leg: Edema present. Left lower leg: Edema present. Comments: Extensive bilateral lower extremity swelling above the knees   Skin:     General: Skin is warm and dry. Capillary Refill: Capillary refill takes less than 2 seconds. Findings: No rash. Neurological:      General: No focal deficit present. Mental Status: He is alert and oriented to person, place, and time. Cranial Nerves: No cranial nerve deficit. Sensory: No sensory deficit.       Motor: No weakness or abnormal muscle tone. Psychiatric:         Mood and Affect: Mood normal.         Behavior: Behavior normal.          Procedures     MDM     This is a 79-year-old male with a history of stage IV lung cancer, hypertension, on chemotherapy who presents to the emergency department with complaints of shortness of breath and bilateral lower extremity swelling. In the emergency department the patient is awake and alert, hemodynamic stable, afebrile. Placed on nasal count oxygen as he had increased work of breathing even at rest.  Extensive bilateral lower extremity edema. No notable erythema no induration or fluctuance no signs of ongoing infection. Metabolic panel showed potassium 3.4. Renal function was normal.  Ministered potassium supplement as well as Lasix per diuresis. Troponin elevated at 297 with repeat of 261. Discussed this with cardiology who agreed with plan for aspirin given patient's multiple comorbidities not a candidate for heart catheterization, particularly patient is not having any chest pain and EKG showed no ischemic changes. They will consult on the patient while in the hospital.  No recent echocardiogram.  Patient with anemia 9.2 not seen on labs from 2 years ago. No signs of active bleeding. Discussed with hospitalist who accepts patient for further evaluation.                --------------------------------------------- PAST HISTORY ---------------------------------------------  Past Medical History:  has a past medical history of Diabetes mellitus without complication ([facility] [address] Epiretinal membrane, left eye, Full dentures, History of depression, Hyperlipidemia, Hypertension, Hypothyroidism, Peripheral neuropathy, Prostate cancer ([address] and Use of cane as ambulatory aid. Past Surgical History:  has a past surgical history that includes eye surgery; Appendectomy; Circumcision; vitrectomy (Left, 2/14/2019); Cataract removal with implant (Bilateral);  Colonoscopy; vitrectomy (Left, 10/10/2019); Tooth Extraction; and bronchoscopy (N/A, 10/24/2019). Social History:  reports that he quit smoking about 37 years ago. His smoking use included cigarettes. He has a 37.50 pack-year smoking history. He has never used smokeless tobacco. He reports current alcohol use. He reports that he does not use drugs. Family History: family history includes Diabetes type 2  in his mother; Heart Attack in his father; Heart Failure in his mother. The patients home medications have been reviewed. Allergies: Patient has no known allergies.     -------------------------------------------------- RESULTS -------------------------------------------------    LABS:  Results for orders placed or performed during the hospital encounter of 03/14/22   COVID-19, Rapid    Specimen: Nasopharyngeal Swab   Result Value Ref Range    SARS-CoV-2, NAAT Not Detected Not Detected   CBC with Auto Differential   Result Value Ref Range    WBC 7.6 4.5 - 11.5 E9/L    RBC 3.33 (L) 3.80 - 5.80 E12/L    Hemoglobin 9.2 (L) 12.5 - 16.5 g/dL    Hematocrit 30.0 (L) 37.0 - 54.0 %    MCV 90.1 80.0 - 99.9 fL    MCH 27.6 26.0 - 35.0 pg    MCHC 30.7 (L) 32.0 - 34.5 %    RDW 19.6 (H) 11.5 - 15.0 fL    Platelets 191 866 - 365 E9/L    MPV 9.4 7.0 - 12.0 fL    Neutrophils % 85.3 (H) 43.0 - 80.0 %    Immature Granulocytes % 0.9 0.0 - 5.0 %    Lymphocytes % 10.0 (L) 20.0 - 42.0 %    Monocytes % 3.2 2.0 - 12.0 %    Eosinophils % 0.3 0.0 - 6.0 %    Basophils % 0.3 0.0 - 2.0 %    Neutrophils Absolute 6.50 1.80 - 7.30 E9/L    Immature Granulocytes # 0.07 E9/L    Lymphocytes Absolute 0.76 (L) 1.50 - 4.00 E9/L    Monocytes Absolute 0.24 0.10 - 0.95 E9/L    Eosinophils Absolute 0.02 (L) 0.05 - 0.50 E9/L    Basophils Absolute 0.02 0.00 - 0.20 E9/L   Comprehensive Metabolic Panel w/ Reflex to MG   Result Value Ref Range    Sodium 135 132 - 146 mmol/L    Potassium reflex Magnesium 3.4 (L) 3.5 - 5.0 mmol/L    Chloride 94 (L) 98 - 107 mmol/L    CO2 32 (H) 22 - 29 mmol/L    Anion Gap 9 7 - 16 mmol/L    Glucose 110 (H) 74 - 99 mg/dL    BUN 20 6 - 23 mg/dL    CREATININE 0.9 0.7 - 1.2 mg/dL    GFR Non-African American >60 >=60 mL/min/1.73    GFR African American >60     Calcium 8.3 (L) 8.6 - 10.2 mg/dL    Total Protein 6.3 (L) 6.4 - 8.3 g/dL    Albumin 2.8 (L) 3.5 - 5.2 g/dL    Total Bilirubin 0.2 0.0 - 1.2 mg/dL    Alkaline Phosphatase 71 40 - 129 U/L    ALT 19 0 - 40 U/L    AST 23 0 - 39 U/L   Lipase   Result Value Ref Range    Lipase 13 13 - 60 U/L   Troponin   Result Value Ref Range    Troponin, High Sensitivity 297 (H) 0 - 11 ng/L   Brain Natriuretic Peptide   Result Value Ref Range    Pro- 0 - 450 pg/mL   SPECIMEN REJECTION   Result Value Ref Range    Rejected Test PT PTT     Reason for Rejection see below    APTT   Result Value Ref Range    aPTT 26.5 24.5 - 35.1 sec   Protime-INR   Result Value Ref Range    Protime 15.3 (H) 9.3 - 12.4 sec    INR 1.4    Magnesium   Result Value Ref Range    Magnesium 2.1 1.6 - 2.6 mg/dL   Troponin   Result Value Ref Range    Troponin, High Sensitivity 261 (H) 0 - 11 ng/L   CK   Result Value Ref Range    Total CK 48 20 - 200 U/L   POCT glucose   Result Value Ref Range    Glucose 104 mg/dL    QC OK? EKG 12 Lead   Result Value Ref Range    Ventricular Rate 104 BPM    Atrial Rate 104 BPM    P-R Interval 132 ms    QRS Duration 80 ms    Q-T Interval 336 ms    QTc Calculation (Bazett) 441 ms    P Axis 42 degrees    R Axis 54 degrees    T Axis 81 degrees       RADIOLOGY:  US DUP LOWER EXTREMITIES BILATERAL VENOUS   Final Result   1. No evidence of DVT in either lower extremity. 2. The peroneal veins could not be visualized on either side. RECOMMENDATIONS:   Unavailable         CTA PULMONARY W CONTRAST   Final Result   1. No evidence of pulmonary embolism      2. Significant interval increase in size of left lung suprahilar and hilar   masses.   There is a large confluent bilobed mass now measuring approximately   7.8 x 6.4 x 9. 8 cm. 3.  Mild fusiform narrowing of the left pulmonary artery. No pulmonary   artery occlusion or filling defect. 4.  Atelectasis of the left lower lobe with compressive occlusion of the left   lower lobe bronchus. Small left effusion. 5.  Lobular metastasis involving the left adrenal gland measuring 3.2 x 3.1   cm on axial images. Mildly increased in size compared to 01/21/2022. XR CHEST PORTABLE   Final Result   Left lung tumor mass with new left lower lobe collapse and possible pleural   effusion. EKG:  This EKG is signed and interpreted by me. Rate: 104bpm  Rhythm: sinus tachycardia  Interpretation: Normal axis. No ST segment elevation or depression. Comparison: stable as compared to patient's most recent EKG      ------------------------- NURSING NOTES AND VITALS REVIEWED ---------------------------  Date / Time Roomed:  3/14/2022  9:50 AM  ED Bed Assignment:  [id number]    The nursing notes within the ED encounter and vital signs as below have been reviewed. Patient Vitals for the past 24 hrs:   BP Temp Temp src Pulse Resp SpO2 Height Weight   03/14/22 1557 122/66 98.2 °F (36.8 °C) Oral 91 18 91 % 5' 10\" (1.778 m) 174 lb 9.7 oz (79.2 kg)   03/14/22 1515  97.9 °F (36.6 °C) Oral 89       03/14/22 1512 133/67   89 20 93 %     03/14/22 1426 (!) 111/59   90 20 92 %     03/14/22 1200 123/64   96  93 %     03/14/22 1100 (!) 105/56   97  92 %     03/14/22 0959 (!) 144/74 97.8 °F (36.6 °C) Oral 104 18 96 % 5' 10\" (1.778 m) 180 lb (81.6 kg)       Oxygen Saturation Interpretation: Normal    ------------------------------------------ PROGRESS NOTES ------------------------------------------    Counseling:  I have spoken with the patient and discussed todays results, in addition to providing specific details for the plan of care and counseling regarding the diagnosis and prognosis.   Their questions are answered at this time and they are agreeable with the plan of admission.    --------------------------------- ADDITIONAL PROVIDER NOTES ---------------------------------  Consultations:  Spoke with Dr. Julianne Hussein. Discussed case. They will admit the patient. This patient's ED course included: a personal history and physicial examination, re-evaluation prior to disposition, multiple bedside re-evaluations, IV medications, cardiac monitoring and continuous pulse oximetry    This patient has remained hemodynamically stable during their ED course. Diagnosis:  1. NSTEMI (non-ST elevated myocardial infarction) ([facility] [address]    2. Bilateral lower extremity edema    3. Shortness of breath    4. Lung mass    5. Atelectasis of left lung        Disposition:  Patient's disposition: Admit to telemetry  Patient's condition is stable.          Latanya Chamorro DO  Resident  03/14/22 0015

## 2022-03-15 NOTE — PROGRESS NOTES
[facility] Quality Flow/Interdisciplinary Rounds Progress Note        Quality Flow Rounds held on March 15, 2022    Disciplines Attending:  Bedside Nurse, ,  and Nursing Unit Jenni was admitted on 3/14/2022  9:50 AM    Anticipated Discharge Date:  Expected Discharge Date: 03/25/22    Disposition:    Eliecer Score:  Eliecer Scale Score: 12    Readmission Risk              Risk of Unplanned Readmission:  20           Discussed patient goal for the day, patient clinical progression, and barriers to discharge. The following Goal(s) of the Day/Commitment(s) have been identified:  Check palliative plan-discussion today re: plan of care, may opt for hospice services.       Samantha Veliz RN  March 15, 2022

## 2022-03-15 NOTE — PROGRESS NOTES
03/15/2022    ALKPHOS 67 03/15/2022    AST 18 03/15/2022    ALT 15 03/15/2022          Current Facility-Administered Medications:     ammonium lactate (LAC-HYDRIN) 12 % lotion, , Topical, BID PRN, Fantasma Pedraza, APRN - CNP, Given at 03/14/22 2108    aspirin chewable tablet 81 mg, 81 mg, Oral, Daily, Fantasma Hernandeze, APRN - CNP, 81 mg at 03/15/22 0802    atorvastatin (LIPITOR) tablet 80 mg, 80 mg, Oral, Daily, Fantasma Hernandeze, APRN - CNP, 80 mg at 03/15/22 0809    ferrous sulfate (IRON 325) tablet 325 mg, 325 mg, Oral, Daily, Fantasma Pedraza, APRN - CNP, 325 mg at 03/15/22 0802    FLUoxetine (PROZAC) tablet 10 mg, 10 mg, Oral, Daily, Fantasma Pedraza, APRN - CNP, 10 mg at 88/55/36 9586    folic acid (FOLVITE) tablet 1 mg, 1 mg, Oral, Daily, Fantasma Pedraza, APRN - CNP, 1 mg at 03/15/22 0802    levothyroxine (SYNTHROID) tablet 50 mcg, 50 mcg, Oral, Daily, Fantasma Pedraza, APRN - CNP, 50 mcg at 03/15/22 0511    megestrol acetate (MEGACE) 400 MG/10ML suspension 120 mg, 120 mg, Oral, BID, Fantasma Hernandeze, APRN - CNP, 120 mg at 03/15/22 0802    oxyCODONE (ROXICODONE) immediate release tablet 5 mg, 5 mg, Oral, Q4H PRN, Fantasma Pedraza, APRN - CNP, 5 mg at 03/14/22 1056    pantoprazole (PROTONIX) tablet 40 mg, 40 mg, Oral, Daily, Fantasma Pedraza, APRN - CNP, 40 mg at 03/15/22 0802    sodium chloride flush 0.9 % injection 5-40 mL, 5-40 mL, IntraVENous, 2 times per day, Fantasma Pedraza, APRN - CNP, 10 mL at 03/15/22 0811    sodium chloride flush 0.9 % injection 5-40 mL, 5-40 mL, IntraVENous, PRN, Fantasma Pedraza, APRN - CNP    0.9 % sodium chloride infusion, 25 mL, IntraVENous, PRN, ISHMAEL Patrick - CNP    enoxaparin (LOVENOX) injection 40 mg, 40 mg, SubCUTAneous, Daily, ISHMAEL Patrick - CNP, 40 mg at 03/14/22 1723    ondansetron (ZOFRAN-ODT) disintegrating tablet 4 mg, 4 mg, Oral, Q8H PRN **OR** ondansetron (ZOFRAN) injection 4 mg, 4 mg, IntraVENous, Q6H PRN, ISHMAEL Patrick - CNP    polyethylene glycol (GLYCOLAX) packet 17 g, 17 g, Oral, Daily PRN, Ck Bare, APRN - CNP    acetaminophen (TYLENOL) tablet 650 mg, 650 mg, Oral, Q6H PRN **OR** acetaminophen (TYLENOL) suppository 650 mg, 650 mg, Rectal, Q6H PRN, Ck Hatfield, APRN - CNP    insulin lispro (HUMALOG) injection vial 0-12 Units, 0-12 Units, SubCUTAneous, TID Alexandra PADILLA, DO, 6 Units at 03/15/22 0513    insulin lispro (HUMALOG) injection vial 0-6 Units, 0-6 Units, SubCUTAneous, Nightly, Mone J Geri, DO, 4 Units at 03/14/22 2032    glucose (GLUTOSE) 40 % oral gel 15 g, 15 g, Oral, PRN, Mone J Plymouth, DO    glucagon (rDNA) injection 1 mg, 1 mg, IntraMUSCular, PRN, Mone J Plymouth, DO    dextrose 5 % solution, 100 mL/hr, IntraVENous, PRN, Mone J Plymouth, DO    dextrose bolus (hypoglycemia) 10% 125 mL, 125 mL, IntraVENous, PRN **OR** dextrose bolus (hypoglycemia) 10% 250 mL, 250 mL, IntraVENous, PRN, Mone J Plymouth, DO    ceFAZolin (ANCEF) 2000 mg in sterile water 20 mL IV syringe, 2,000 mg, IntraVENous, Q8H, Mone J Geri, DO, 2,000 mg at 03/15/22 0352    insulin glargine (LANTUS) injection vial 30 Units, 30 Units, SubCUTAneous, Nightly, ISHMAEL Goodman - CNP, 30 Units at 03/14/22 2042    US DUP LOWER EXTREMITIES BILATERAL VENOUS   Final Result   1. No evidence of DVT in either lower extremity. 2. The peroneal veins could not be visualized on either side. RECOMMENDATIONS:   Unavailable         CTA PULMONARY W CONTRAST   Final Result   1. No evidence of pulmonary embolism      2. Significant interval increase in size of left lung suprahilar and hilar   masses. There is a large confluent bilobed mass now measuring approximately   7.8 x 6.4 x 9.8 cm. 3.  Mild fusiform narrowing of the left pulmonary artery. No pulmonary   artery occlusion or filling defect. 4.  Atelectasis of the left lower lobe with compressive occlusion of the left   lower lobe bronchus. Small left effusion. 5.  Lobular metastasis involving the left adrenal gland measuring 3.2 x 3.1   cm on axial images. Mildly increased in size compared to 01/21/2022. XR CHEST PORTABLE   Final Result   Left lung tumor mass with new left lower lobe collapse and possible pleural   effusion. 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 PDL1 expressed TPS 2%, negative for EGFR, BRAF, ALK, ROS1. Treated with Carbo/Taxol from 11/20/19 to 1/2/20 and last RT was on 1/6/20 followed by consolidation immunotherapy Imfinzi (anti-PDL1) from 2/13/20 to 6/4/20. Evidence of disease progression on 6/9/20 consistent with stage IV disease. Treated with 4 cycles of Carbo and Alimta on 6/24/2020 to 8/24/20. Maintenance Alimta from 9/16/20. Had progression. Treated with combination Ipi/Nivo from 6/2/21 with partial response. Now with progression. Started Gemzar on 1/26/2022. Last treatment 3/9/2022 with dose reduction. A/P:  Metastatic adenocarcinoma of the lung - CTA lungs completed and comparison of tumor burden made to CT chest from 10/2021, most recent in 1/2022. I will review further with Dr. Yessy Valle. If patient experienced progression of disease his gemzar may be discontinued. Candidiasis - mild, nystatin orally  Dysgeusia - treat candidiasis  Appetite - decadron 4 mg with breakfast and lunch  Constipation - restart home bowel regimen  Anemia - follow likely secondary to chemotherapy and chronic disease    Thank you for allowing us to participate in the care of Tarun Hester. Phil Tucker PA-C  [phone]    Electronically signed by KANDY Carlton on 3/15/2022 at 9:22 AM    Note: This report was completed using [employer] voiced recognition software. Every effort has been made to ensure accuracy; however, inadvertent computerized transcription errors may be present.     Attending Addendum: Patient seen and examined personally. All pertinent labs and imaging reviewed. Case discussed with PA. Agree with the progress note as above which has been updated to reflect my changes. He has recent change on CT but comparison was to older CT from Oct.  He treatmetn was recently changed due to progression in Jan.  Not much change compared to recent CT images. Continue supportive treatment for now. Will follow him as outpatient.      Alecia Garcia, [address] for [address]

## 2022-03-15 NOTE — PROGRESS NOTES
INPATIENT CARDIOLOGY FOLLOW-UP    Name: Jose De Jesus Thompson    Age: 79 y.o. Date of Admission: 3/14/2022  9:50 AM    Date of Service: 3/15/2022    Chief Complaint: Follow-up for metastatic adenocarcinoma of the lung, abnormal troponin, hypertension, chronic obstructive lung disease    Interim History: The patient remains debilitated with no new symptoms. He relates a persistent intermittently productive cough with somewhat tenacious and mildly purulent sputum while remaining hemodynamically stable and afebrile with no needs of supplemental oxygen. Assessment of the oncology service is pending. Review of Systems: The remainder of a complete multisystem review including consitutional, central nervous, respiratory, circulatory, gastrointestinal, genitourinary, endocrinologic, hematologic, musculoskeletal and psychiatric are negative.     Problem List:  Patient Active Problem List   Diagnosis    Essential hypertension, benign    [facility] mellitus ([facility]    Hyperlipidemia    Malignant neoplasm of upper lobe of left lung ([facility]    NSTEMI (non-ST elevated myocardial infarction) ([facility]    NSTEMI, initial episode of care ([facility]       Allergies:  No Known Allergies    Current Medications:  Current Facility-Administered Medications   Medication Dose Route Frequency Provider Last Rate Last Admin    ammonium lactate (LAC-HYDRIN) 12 % lotion   Topical BID PRN ISHMAEL Law CNP   Given at 03/14/22 2108    aspirin chewable tablet 81 mg  81 mg Oral Daily ISHMAEL Law - CNP        atorvastatin (LIPITOR) tablet 80 mg  80 mg Oral Daily ISHMAEL Law - CNP        ferrous sulfate (IRON 325) tablet 325 mg  325 mg Oral Daily ISHMAEL Law CNP        FLUoxetine (PROZAC) tablet 10 mg  10 mg Oral Daily Pierre Bullard APRN - CNP        folic acid (FOLVITE) tablet 1 mg  1 mg Oral Daily Pierre Bullrad APRN - CNP        levothyroxine (SYNTHROID) tablet 50 mcg  50 mcg Oral Daily Sherman Dennis APRN - CNP   50 mcg at 03/15/22 0511    megestrol acetate (MEGACE) 400 MG/10ML suspension 120 mg  120 mg Oral BID Sherman Dennis, APRN - CNP   120 mg at 03/14/22 1950    oxyCODONE (ROXICODONE) immediate release tablet 5 mg  5 mg Oral Q4H PRN Marcamilo Rafaelaayaz, APRN - CNP   5 mg at 03/14/22 2356    pantoprazole (PROTONIX) tablet 40 mg  40 mg Oral Daily Marcamilo Rafaelaayaz APRN - CNP        sodium chloride flush 0.9 % injection 5-40 mL  5-40 mL IntraVENous 2 times per day Marcarmelitasammy Dennis, APRN - CNP   10 mL at 03/14/22 1952    sodium chloride flush 0.9 % injection 5-40 mL  5-40 mL IntraVENous PRN Marcamilo Rafaelaayaz, APRN - CNP        0.9 % sodium chloride infusion  25 mL IntraVENous PRN Sherman Dennis APRN - CNP        enoxaparin (LOVENOX) injection 40 mg  40 mg SubCUTAneous Daily Marcarmelitasammy Dennis APRN - CNP   40 mg at 03/14/22 1723    ondansetron (ZOFRAN-ODT) disintegrating tablet 4 mg  4 mg Oral Q8H PRN Sherman Dennis APRN - CNP        Or    [facility]ron [facility]) injection 4 mg  4 mg IntraVENous Q6H PRN Sherman Dennis, APRN - CNP        polyethylene glycol (GLYCOLAX) packet 17 g  17 g Oral Daily PRN Sherman Dennis, APRN - CNP        acetaminophen (TYLENOL) tablet 650 mg  650 mg Oral Q6H PRN Sherman Dennis, APRN - CNP        Or    acetaminophen (TYLENOL) suppository 650 mg  650 mg Rectal Q6H PRN Sherman Dennis, APRN - CNP        insulin lispro (HUMALOG) injection vial 0-12 Units  0-12 Units SubCUTAneous TID WC Hulan Backers, DO   6 Units at 03/15/22 0513    insulin lispro (HUMALOG) injection vial 0-6 Units  0-6 Units SubCUTAneous Nightly Hulan Backers, DO   4 Units at 03/14/22 2032    glucose (GLUTOSE) 40 % oral gel 15 g  15 g Oral PRN Dennis Backers, DO        glucagon (rDNA) injection 1 mg  1 mg IntraMUSCular PRN Mone JACKSON Geri, DO        dextrose 5 % solution  100 mL/hr IntraVENous PRN Mone JACKSON Geri, DO        dextrose bolus (hypoglycemia) 10% 125 mL  125 mL IntraVENous PRN Buster Carota, DO        Or    dextrose bolus (hypoglycemia) 10% 250 mL  250 mL IntraVENous PRN Mone Nevarez DO        ceFAZolin (ANCEF) 2000 mg in sterile water 20 mL IV syringe  2,000 mg IntraVENous Q8H Mone Nevarez DO   2,000 mg at 03/15/22 0352    insulin glargine (LANTUS) injection vial 30 Units  30 Units SubCUTAneous Nightly Kendra Hill APRIVETTE - CNP   30 Units at 03/14/22 2042      sodium chloride      dextrose         Physical Exam:  BP (!) 101/53   Pulse 103   Temp 97.9 °F (36.6 °C) (Oral)   Resp 18   Ht 5' 10\" (1.778 m)   Wt 179 lb 7.3 oz (81.4 kg)   SpO2 95%   BMI 25.75 kg/m²   Weight change: Wt Readings from Last 3 Encounters:   03/15/22 179 lb 7.3 oz (81.4 kg)   11/16/21 187 lb (84.8 kg)   11/12/20 187 lb (84.8 kg)     The patient is awake, alert and in no discomfort or distress. No gross musculoskeletal deformity is present. No significant skin or nail changes are present. Gross examination of head, eyes, nose and throat are negative. Jugular venous pressure is normal and no carotid bruits are present. Normal respiratory effort is noted with no accessory muscle usage present. Lung fields demonstrate slightly coarse breath sounds with diminished breath sounds within both bases to ascultation. Cardiac examination is notable for a regular rate and rhythm with no palpable thrill. No gallop rhythm or cardiac murmur are identified. A benign abdominal examination is present with no masses or organomegaly. Intact pulses are present throughout all extremities and mild pretibial edema somewhat prominently noted in his right as opposed to left lower extremity edema is present. No focal neurologic deficits are present. Intake/Output:    Intake/Output Summary (Last 24 hours) at 3/15/2022 0753  Last data filed at 3/14/2022 2320  Gross per 24 hour   Intake    Output 1450 ml   Net -1450 ml     No intake/output data recorded.     Laboratory Tests:  Lab Results   Component Value Date    CREATININE 0.9 03/14/2022    BUN 20 03/14/2022     03/14/2022    K 3.4 (L) 03/14/2022    CL 94 (L) 03/14/2022    CO2 32 (H) 03/14/2022     Recent Labs     03/14/22  1225   CKTOTAL 48     No results found for: BNP  Lab Results   Component Value Date    WBC 7.0 03/15/2022    RBC 3.09 03/15/2022    HGB 8.6 03/15/2022    HCT 27.8 03/15/2022    MCV 90.0 03/15/2022    MCH 27.8 03/15/2022    MCHC 30.9 03/15/2022    RDW 19.7 03/15/2022     03/15/2022    MPV 9.7 03/15/2022     Recent Labs     03/14/22  1025   ALKPHOS 71   ALT 19   AST 23   PROT 6.3*   BILITOT 0.2   LABALBU 2.8*     Lab Results   Component Value Date    MG 2.1 03/14/2022     Lab Results   Component Value Date    PROTIME 15.3 03/14/2022    INR 1.4 03/14/2022     Lab Results   Component Value Date    TSH 1.75 08/22/2018     No components found for: CHLPL  Lab Results   Component Value Date    TRIG 77 02/17/2020    TRIG 67 02/26/2019    TRIG 60 08/22/2018     Lab Results   Component Value Date    HDL 41 02/17/2020    HDL 42 02/26/2019    HDL 50 08/22/2018     Lab Results   Component Value Date    LDLCALC 63 02/17/2020    LDLCALC 60 02/26/2019    LDLCALC 64 08/22/2018       Cardiac Tests:  Telemetry findings reviewed: srhythm, no new tachy/bradyarrhythmias overnight      ASSESSMENT / PLAN: On a clinical basis, the patient remains compensated from a cardiovascular standpoint with no active cardiovascular symptoms. He continues to report significant weakness and debilitation as well as that of a cough intermittently productive of somewhat tenacious and mildly purulent sputum in the face of his progressive carcinoma with potential developing concerns of a postobstructive pneumonia. Additional assessment of the oncology service and recommendations are pending as well as that of assessment of palliative care to provide additional support and assistance with advanced directives.   No additional cardiovascular assessment presently appears indicated in the face of his existing guarded prognosis with additional potential benefit of the involvement of the pulmonary service deferred to primary care. As previously outlined, continue nutritional support will be essential to fluid mobilization contributing to his lower extremity edema as well as reducing risk of progressive debilitation. We will further evaluate him should additional cardiovascular difficulties or concerns arise. Note: This report was completed utilizing computer voice recognition software. Every effort has been made to ensure accuracy, however; inadvertent computerized transcription errors may be present. Bennett Guy.  Alda Vickers, [address] [place] [address]

## 2022-03-15 NOTE — CONSULTS
Palliative Care Department  [phone]  Palliative Care Initial Consult  Provider ISHMAEL Harrell CNP     Valente Uriostegui  [id number]  Hospital Day: 2  Date of Initial Consult: 3/15/2022  Referring Provider: ISHMAEL Alvarez CNP  Palliative Medicine was consulted for assistance with: Goals of Care, Code Status Discussion, Family Support, Symptom Management    HPI:   Valente Uriostegui is a 79 y.o. with a medical history of DM, depression, HLD, HTN, hypothyroidism, peripheral neuropathy, prostate CA s/p radiation, stage IV lung CA who was admitted on 3/14/2022 from home with a CHIEF COMPLAINT of shortness of breath and BLE edema. ED work-up significant for: BUN 20, creatinine 0.9, calcium 8.3, proBNP 354, troponin 297-261, WBC 7.6, hemoglobin 9.2, hematocrit 30.0, albumin 2.8. Imaging significant for significant interval increase in left lung suprahilar and hilar masses, large confluent bilobed mass, atelectasis of the left lower lobe with compressive occlusion of the left lower lobe bronchus, small left effusion, and lobular metastasis involving the left adrenal gland. Patient was admitted for further management. Palliative medicine consulted for goals of care, CODE STATUS discussion, family support, and symptom management. ASSESSMENT/PLAN:     Pertinent Hospital Diagnoses      Metastatic adenocarcinoma of the lung      Palliative Care Encounter / Counseling Regarding Goals of Care  Please see detailed goals of care discussion as below   At this time, Valente Uriostegui, Does have capacity for medical decision-making.   Capacity is time limited and situation/question specific   Outcome of goals of care meeting: Change code status to limited code, add home bowel regimen, continue with current management   Code status Limited no intubation or compressions only; okay for defibrillation, okay for resuscitative medications    Advanced Directives: no POA or living will in epic   Surrogate/Legal NOK:  Victorino Hall, spouse, [phone], [phone]    Bowel regimen:  -Patient requests home regimen be restarted: colace 2 capsules + milk of magnesia daily PRN    Spiritual assessment: no spiritual distress identified  Bereavement and grief: to be determined  Referrals to: none today  SUBJECTIVE:     Current medical issues leading to Palliative Medicine involvement include   Active Hospital Problems    Diagnosis Date Noted    NSTEMI (non-ST elevated myocardial infarction) ([facility] [I21.4] 03/14/2022    NSTEMI, initial episode of care [facility]) [I21.4] 03/14/2022       Details of Conversation:  Chart reviewed and patient seen. Patient is resting in bed, spouse at bedside. Role of palliative medicine explained. Patient states he has a living will and healthcare power of . Identifies his spouse, Melissa Velazquez, is healthcare power of . Discussion regarding goals of care and CODE STATUS. Patient and Melissa Velazquez both state goal is to continue with current treatment including chemotherapy. Melissa Velazquez states that goal is to continue with Gem[facility] if it is an option. Patient wishes to change CODE STATUS to limited code: no intubation and no compressions only, okay for defibrillation and okay for resuscitative medications Patient denies symptoms other than constipation which he reports is not new. Patient is requesting his home bowel regimen be restarted: milk of magnesia with 2 colace capsules. Patient denies pain, shortness of breath, or change in appetite. Further goals to be determined after patient speaks to oncology, wife is hopeful patient can return home with therapy or be discharged to rehab facility. Support offered. Will follow.      OBJECTIVE:   Prognosis: unknown    Physical Exam:  BP (!) 101/53   Pulse 103   Temp 97.9 °F (36.6 °C) (Oral)   Resp 18   Ht 5' 10\" (1.778 m)   Wt 179 lb 7.3 oz (81.4 kg)   SpO2 95%   BMI 25.75 kg/m²   Constitutional:  Elderly,  NAD, awake, alert  Eyes: no scleral icterus, normal lids, no discharge  ENMT:  Normocephalic, atraumatic, mucosa moist, EOMI  Neck:  trachea midline, no JVD  Lungs:  RA, easy and unlabored respirations   Heart: RRR  Abd:  Soft, non tender, non distended, bowel sounds present  Ext:  Moving all extremities, + edema, pulses present  Skin:  Warm and dry  Neuro:   Alert, grossly nonfocal; following commands    Objective data reviewed: labs, images, records, medication use, vitals and chart    Discussed patient and the plan of care with the other IDT members: Palliative Medicine IDT Team    Time/Communication  Greater than 50% of time spent, total 50 minutes in counseling and coordination of care at the bedside regarding goals of care, symptom management, diagnosis and prognosis and see above. Thank you for allowing Palliative Medicine to participate in the care of Estefania Ingram.

## 2022-03-15 NOTE — PROGRESS NOTES
commands      Balance:  Max assist in stand , high fall risk due to weakness and poor balance  Endurance: poor   Bed/Chair alarm: Yes      ASSESSMENT  Pt displays functional ability as noted in the objective portion of this evaluation. Conditions Requiring Skilled Therapeutic Intervention:    [x]Decreased strength     []Decreased ROM  [x]Decreased functional mobility  [x]Decreased balance   [x]Decreased endurance   [x]Decreased posture  []Decreased sensation  [x]Decreased coordination   []Decreased vision  []Decreased safety awareness   []Increased pain         Treatment/Education:    Pt in bed upon arrival ; agreeable to PT. Pt's wife present. Mod assist x 2 with bed to [facility] transfer. Cues given for hand placement with transfers. Pt in flexed position in stand/transfer. Max assist x 2 BSC to recliner. Pt required assist with hygiene    Pt educated on fall risk,  Safe and proper technique with mobility        Patient response to education:   Pt verbalized understanding Pt demonstrated skill Pt requires further education in this area   x  with cues   x       Comments:  Pt left  In recliner after session, with call light in reach. Waffle cushion placed       Rehab potential is Good for reaching above PT goals. Pts/ family goals   1. None stated      Patient and or family understand(s) diagnosis, prognosis, and plan of care. - yes     PLAN  PT care will be provided in accordance with the objectives noted above. Whenever appropriate, clear delegation orders will be provided for nursing staff. Exercises and functional mobility practice will be used as well as appropriate assistive devices or modalities to obtain goals. Patient and family education will also be administered as needed.         PLAN OF CARE:    Current Treatment Recommendations     [x] Strengthening to improve independence with functional mobility   [x] ROM to improve independence with functional mobility   [x] Balance Training to improve static/dynamic balance and to reduce fall risk  [x] Endurance Training to improve activity tolerance during functional mobility   [x] Transfer Training to improve safety and independence with all functional transfers   [] Gait Training to improve gait mechanics, endurance and assess need for appropriate assistive device  [] Stair Training in preparation for safe discharge home and/or into the community   [x] Positioning to prevent skin breakdown and contractures  [x] Safety and Education Training   [x] Patient/Caregiver Education   [] HEP  [] Other     Frequency of treatments will be 2-5x/week x  7 -14 days. Time in: 1350   Time out:  1407       Evaluation Time includes thorough review of current medical information, gathering information on past medical history/social history and prior level of function, completion of standardized testing/informal observation of tasks, assessment of data and education on plan of care and goals.     CPT codes:  [x] Low Complexity PT evaluation 30651  [] Moderate Complexity PT evaluation 17427  [] High Complexity PT evaluation 97376  [] PT Re-evaluation 01527  [] Gait training 04767  minutes  [] Therapeutic activities 53554  minutes  [] Therapeutic exercises 19184  minutes  [] Neuromuscular reeducation 48603  minutes       Lan 18 number:  PT 6060

## 2022-03-15 NOTE — PROGRESS NOTES
Resident up in chair with assistance from PT/OT, states he is ready to go back to bed. Two person max assist back to bed.

## 2022-03-15 NOTE — PROGRESS NOTES
dry  Head: normocephalic and atraumatic  Eyes: pupils equal, round, and reactive to light, extraocular eye movements intact, conjunctivae normal  Neck: neck supple and non tender without mass   Pulmonary/Chest: clear to auscultation bilaterally- no wheezes, rales or rhonchi, normal air movement, no respiratory distress  Cardiovascular: normal rate, normal S1 and S2 and no carotid bruits  Abdomen: soft, non-tender, non-distended, normal bowel sounds, no masses or organomegaly  Extremities: no cyanosis, no clubbing and no edema, RLE erythema/warm to touch  Neurologic: no cranial nerve deficit and speech normal        Recent Labs     03/14/22  1025 03/14/22  1630 03/15/22  0650     --  136   K 3.4*  --  3.5   CL 94*  --  96*   CO2 32*  --  32*   BUN 20  --  18   CREATININE 0.9  --  0.8   GLUCOSE 110* 104 165*   CALCIUM 8.3*  --  8.2*       Recent Labs     03/14/22  1025 03/15/22  0650   WBC 7.6 7.0   RBC 3.33* 3.09*   HGB 9.2* 8.6*   HCT 30.0* 27.8*   MCV 90.1 90.0   MCH 27.6 27.8   MCHC 30.7* 30.9*   RDW 19.6* 19.7*    349   MPV 9.4 9.7       Radiology:None    Assessment:    Principal Problem:    NSTEMI (non-ST elevated myocardial infarction) (HCC)  Active Problems:    NSTEMI, initial episode of care [facility])  Resolved Problems:    * No resolved hospital problems. *      Plan:    1. RLE Cellulitis - Venous US is negative. BCx are negative. Continue ancef  2. Recurrent stage IV poorly differentiated adenocarcinoma of ODALIS - Left lung suprahilar and hilar mass with biloved mass 7.8*6.4*9.8 cm with mets to the left adrenal gland - Pending oncology consultation  3. NSTEMI II - No ekg changes. Patient has complaints of dyspnea although likely due to lung pathology  4. DM-II - Patient takes glargine 44 units qHS and lispro at home. Currently on medium dose insulin SS and lantus 30 u qHS. 5. HLD - Continue lipitor  6. Hypothyroidism - Continue synthroid  7. H/O Prostate CA S/P radiation  8.  DVT prophylaxis - Lovenox    PT/OT    NOTE: This report was transcribed using voice recognition software. Every effort was made to ensure accuracy; however, inadvertent computerized transcription errors may be present.     Electronically signed by Ashley Gotti DO on 3/15/2022 at 11:34 AM

## 2022-03-15 NOTE — CARE COORDINATION
Care coordination:  Met with patient and wife bedside re: care transitions/ discharge planning. Patient admitted from home where he resides with spouse. Patient is active chemo and sees Dr. Jennifer Plaza at the [facility] [place]. At baseline patient uses a walker, however admits has been weaker and has been using his wheelchair more frequently. Patient endorses he has all diabetic supplies. Patient does not wear supplemental oxygen. PT/OT has been ordered. Plan at discharge is tentatively home where patient can continue chemotherapy treatment for cancer. - [facility]. Will follow [facility] and adjust plan accordingly.      Holly Corbin RN

## 2022-03-16 NOTE — PROGRESS NOTES
Subjective:  Chart reviewed  Patient seen at bedside with wife  He feels better since admission and leg swelling continues to decrease  BM this am  The patient is awake and alert. No problems overnight  Denies chest pain, angina, dyspnea, abdominal discomfort, nausea/vomiting and diarrhea       Objective:    BP (!) 103/49   Pulse 98   Temp 98.1 °F (36.7 °C) (Oral)   Resp 16   Ht 5' 10\" (1.778 m)   Wt 180 lb 8.8 oz (81.9 kg)   SpO2 93%   BMI 25.91 kg/m²     General: NAD, awake and alert  HEENT: normocephalic/atraumatic, mucosa dry, sclera anicteric, conjuntiva pink  NECK: supple, trachea midline  Heart:  sinus rhythm, rapid, no murmurs, gallops, or rubs.   Lungs:  Diminished breath sounds on left, right CTA,, no wheeze, rales or rhonchi anteriorly  Abd: BS present, nontender, nondistended, no masses  Extrem:  Trace edema of LE remaining, overlying skin erythematous on lower legs right side worse than left  is, or edema  Skin: Intact, no petechia or purpura, per extremity exam    CBC with Differential:    Lab Results   Component Value Date    WBC 10.7 03/16/2022    RBC 3.19 03/16/2022    HGB 8.8 03/16/2022    HCT 28.8 03/16/2022     03/16/2022    MCV 90.3 03/16/2022    MCH 27.6 03/16/2022    MCHC 30.6 03/16/2022    RDW 19.5 03/16/2022    LYMPHOPCT 9.9 03/15/2022    MONOPCT 6.9 03/15/2022    EOSPCT 0.0 12/26/2019    BASOPCT 0.3 03/15/2022    MONOSABS 0.48 03/15/2022    LYMPHSABS 0.69 03/15/2022    EOSABS 0.01 03/15/2022    BASOSABS 0.02 03/15/2022     CMP:    Lab Results   Component Value Date     03/16/2022    K 3.8 03/16/2022    K 3.5 03/15/2022    CL 97 03/16/2022    CO2 32 03/16/2022    BUN 15 03/16/2022    CREATININE 0.7 03/16/2022    GFRAA >60 03/16/2022    LABGLOM >60 03/16/2022    GLUCOSE 135 03/16/2022    PROT 5.7 03/15/2022    LABALBU 2.2 03/15/2022    CALCIUM 8.6 03/16/2022    BILITOT <0.2 03/15/2022    ALKPHOS 67 03/15/2022    AST 18 03/15/2022    ALT 15 03/15/2022          Current Facility-Administered Medications:     magnesium hydroxide (MILK OF MAGNESIA) 400 MG/5ML suspension 30 mL, 30 mL, Oral, Daily PRN, 30 mL at 03/15/22 1123 **AND** docusate sodium (COLACE) capsule 200 mg, 200 mg, Oral, Daily PRN, Jessica Keller, APRN - CNP    nystatin (MYCOSTATIN) 975022 UNIT/ML suspension 500,000 Units, 5 mL, Oral, 4x Daily, Jewell Layton PA, 500,000 Units at 03/16/22 4441    dexamethasone (DECADRON) tablet 4 mg, 4 mg, Oral, BID, [facility] PA, 4 mg at 03/16/22 7123    ammonium lactate (LAC-HYDRIN) 12 % lotion, , Topical, BID PRN, Fantasma Pedraza APRN - CNP, Given at 03/14/22 2108    aspirin chewable tablet 81 mg, 81 mg, Oral, Daily, Fantasma Pedraza APRN - CNP, 81 mg at 03/16/22 8911    atorvastatin (LIPITOR) tablet 80 mg, 80 mg, Oral, Daily, Fantasma Pedraza, APRN - CNP, 80 mg at 03/16/22 8333    ferrous sulfate (IRON 325) tablet 325 mg, 325 mg, Oral, Daily, Fantasma Pedraza APRN - CNP, 325 mg at 03/16/22 2314    FLUoxetine (PROZAC) tablet 10 mg, 10 mg, Oral, Daily, Fantasma Pedraza, APRN - CNP, 10 mg at 92/23/97 7073    folic acid (FOLVITE) tablet 1 mg, 1 mg, Oral, Daily, Fantasma Pedraza APRN - CNP, 1 mg at 03/16/22 5262    levothyroxine (SYNTHROID) tablet 50 mcg, 50 mcg, Oral, Daily, Fantasma Pedraza, APRN - CNP, 50 mcg at 03/16/22 0521    oxyCODONE (ROXICODONE) immediate release tablet 5 mg, 5 mg, Oral, Q4H PRN, Fantasma Pedraza APRN - CNP, 5 mg at 03/16/22 0315    pantoprazole (PROTONIX) tablet 40 mg, 40 mg, Oral, Daily, Fantasma Pedraza APRN - CNP, 40 mg at 03/16/22 3567    sodium chloride flush 0.9 % injection 5-40 mL, 5-40 mL, IntraVENous, 2 times per day, Fantasma Pedraza, APRN - CNP, 5 mL at 03/16/22 0823    sodium chloride flush 0.9 % injection 5-40 mL, 5-40 mL, IntraVENous, PRN, Fantasma Pedraza, APRN - CNP    0.9 % sodium chloride infusion, 25 mL, IntraVENous, PRN, Fantasma Hernandeze, APRN - CNP    enoxaparin (LOVENOX) injection 40 mg, 40 mg, SubCUTAneous, Daily, Gerald Allis, APRN - CNP, 40 mg at 03/15/22 1737    ondansetron (ZOFRAN-ODT) disintegrating tablet 4 mg, 4 mg, Oral, Q8H PRN **OR** ondansetron (ZOFRAN) injection 4 mg, 4 mg, IntraVENous, Q6H PRN, Gerald Allis, APRN - CNP    polyethylene glycol (GLYCOLAX) packet 17 g, 17 g, Oral, Daily PRN, Gerald Allis, APRN - CNP    acetaminophen (TYLENOL) tablet 650 mg, 650 mg, Oral, Q6H PRN **OR** acetaminophen (TYLENOL) suppository 650 mg, 650 mg, Rectal, Q6H PRN, Gerald Allis, APRN - CNP    insulin lispro (HUMALOG) injection vial 0-12 Units, 0-12 Units, SubCUTAneous, TID VALERIE, Flavio Lomax, DO, 2 Units at 03/16/22 0523    insulin lispro (HUMALOG) injection vial 0-6 Units, 0-6 Units, SubCUTAneous, Nightly, Mone J Geri, DO, 5 Units at 03/15/22 2043    glucose (GLUTOSE) 40 % oral gel 15 g, 15 g, Oral, PRN, Mone J Geri, DO    glucagon (rDNA) injection 1 mg, 1 mg, IntraMUSCular, PRN, Mone J Geri, DO    dextrose 5 % solution, 100 mL/hr, IntraVENous, PRN, Mone J [place], DO    dextrose bolus (hypoglycemia) 10% 125 mL, 125 mL, IntraVENous, PRN **OR** dextrose bolus (hypoglycemia) 10% 250 mL, 250 mL, IntraVENous, PRN, Mone J [place], DO    ceFAZolin (ANCEF) 2000 mg in sterile water 20 mL IV syringe, 2,000 mg, IntraVENous, Q8H, Mone J [place], DO, 2,000 mg at 03/16/22 0338    insulin glargine (LANTUS) injection vial 30 Units, 30 Units, SubCUTAneous, Nightly, Ilean Rhyme, APRN - CNP, 30 Units at 03/15/22 2043    US DUP LOWER EXTREMITIES BILATERAL VENOUS   Final Result   1. No evidence of DVT in either lower extremity. 2. The peroneal veins could not be visualized on either side. RECOMMENDATIONS:   Unavailable         CTA PULMONARY W CONTRAST   Final Result   1. No evidence of pulmonary embolism      2. Significant interval increase in size of left lung suprahilar and hilar   masses. There is a large confluent bilobed mass now measuring approximately   7.8 x 6.4 x 9.8 cm.       3.  Mild EDNA  [phone]    Electronically signed by KANDY Dietrich on 3/16/2022 at 11:06 AM    Note: This report was completed using [employer] voiced recognition software. Every effort has been made to ensure accuracy; however, inadvertent computerized transcription errors may be present.

## 2022-03-16 NOTE — PROGRESS NOTES
INPATIENT CARDIOLOGY FOLLOW-UP    Name: Curtis Bautista    Age: 79 y.o. Date of Admission: 3/14/2022  9:50 AM    Date of Service: 3/16/2022    Chief Complaint: Follow-up for metastatic adenocarcinoma of the lung, abnormal troponin, hypertension, chronic obstructive lung disease    Interim History: The patient presently reports appearing stronger and denies any active cardiovascular symptoms. Interim assessment of his oncologist and recommendations have been reviewed as well as that of the assessment of the palliative care service. Review of Systems: The remainder of a complete multisystem review including consitutional, central nervous, respiratory, circulatory, gastrointestinal, genitourinary, endocrinologic, hematologic, musculoskeletal and psychiatric are negative.     Problem List:  Patient Active Problem List   Diagnosis    Essential hypertension, benign    [facility] [address]    Hyperlipidemia    Malignant neoplasm of upper lobe of left lung ([facility] [address]    NSTEMI (non-ST elevated myocardial infarction) ([facility] [address]    NSTEMI, initial episode of care ([facility] [address]       Allergies:  No Known Allergies    Current Medications:  Current Facility-Administered Medications   Medication Dose Route Frequency Provider Last Rate Last Admin    magnesium hydroxide (MILK OF MAGNESIA) 400 MG/5ML suspension 30 mL  30 mL Oral Daily PRN ISHMAEL Kang - CNP   30 mL at 03/15/22 1123    And    docusate sodium (COLACE) capsule 200 mg  200 mg Oral Daily PRN Yolanda Sanchez APRN - CNP        nystatin (MYCOSTATIN) 570739 UNIT/ML suspension 500,000 Units  5 mL Oral 4x Daily KANDY Baires   500,000 Units at 03/15/22 2043    dexamethasone (DECADRON) tablet 4 mg  4 mg Oral BID KANDY Baires   4 mg at 03/15/22 1223    ammonium lactate (LAC-HYDRIN) 12 % lotion   Topical BID PRN ISHMAEL Paul CNP   Given at 03/14/22 2108    aspirin chewable tablet 81 mg  81 mg Oral Daily ISHMAEL Paul CNP 81 mg at 03/15/22 0802    atorvastatin (LIPITOR) tablet 80 mg  80 mg Oral Daily Marijo Louder, APRN - CNP   80 mg at 03/15/22 0809    ferrous sulfate (IRON 325) tablet 325 mg  325 mg Oral Daily Marijo Louder, APRN - CNP   325 mg at 03/15/22 0802    FLUoxetine (PROZAC) tablet 10 mg  10 mg Oral Daily Marijo Louder, APRN - CNP   10 mg at 85/98/91 0550    folic acid (FOLVITE) tablet 1 mg  1 mg Oral Daily Marijo Louder, APRN - CNP   1 mg at 03/15/22 0802    levothyroxine (SYNTHROID) tablet 50 mcg  50 mcg Oral Daily Marijo Louder, APRN - CNP   50 mcg at 03/16/22 9473    oxyCODONE (ROXICODONE) immediate release tablet 5 mg  5 mg Oral Q4H PRN Marijo Louder, APRN - CNP   5 mg at 03/16/22 0315    pantoprazole (PROTONIX) tablet 40 mg  40 mg Oral Daily Marijo Louder, APRN - CNP   40 mg at 03/15/22 0802    sodium chloride flush 0.9 % injection 5-40 mL  5-40 mL IntraVENous 2 times per day Marijo Louder, APRN - CNP   10 mL at 03/15/22 2044    sodium chloride flush 0.9 % injection 5-40 mL  5-40 mL IntraVENous PRN Marijo Louder, APRN - CNP        0.9 % sodium chloride infusion  25 mL IntraVENous PRN Marijo Louder, APRN - CNP        enoxaparin (LOVENOX) injection 40 mg  40 mg SubCUTAneous Daily Marijo Louder, APRN - CNP   40 mg at 03/15/22 1737    ondansetron (ZOFRAN-ODT) disintegrating tablet 4 mg  4 mg Oral Q8H PRN Felisha Louder, APRN - CNP        Or    ondansetron [facility]) injection 4 mg  4 mg IntraVENous Q6H PRN Felisha Louder, APRN - CNP        polyethylene glycol (GLYCOLAX) packet 17 g  17 g Oral Daily PRN Marijo Louder, APRN - CNP        acetaminophen (TYLENOL) tablet 650 mg  650 mg Oral Q6H PRN Felisha Louder, APRN - CNP        Or    acetaminophen (TYLENOL) suppository 650 mg  650 mg Rectal Q6H PRN ISHMAEL Paul - CNP        insulin lispro (HUMALOG) injection vial 0-12 Units  0-12 Units SubCUTAneous TID  Zach Day DO   2 Units at 03/16/22 7261    insulin lispro (HUMALOG) injection vial 0-6 Units  0-6 Units SubCUTAneous Nightly Yesi Reynaldoing, DO   5 Units at 03/15/22 2043    glucose (GLUTOSE) 40 % oral gel 15 g  15 g Oral PRN Yesi Jacobsing, DO        glucagon (rDNA) injection 1 mg  1 mg IntraMUSCular PRN Mone J Geri, DO        dextrose 5 % solution  100 mL/hr IntraVENous PRN Mone J [place], DO        dextrose bolus (hypoglycemia) 10% 125 mL  125 mL IntraVENous PRN Mone J [place], DO        Or    dextrose bolus (hypoglycemia) 10% 250 mL  250 mL IntraVENous PRN Mone J Geri, DO        ceFAZolin (ANCEF) 2000 mg in sterile water 20 mL IV syringe  2,000 mg IntraVENous Q8H Mone J Geri, DO   2,000 mg at 03/16/22 0338    insulin glargine (LANTUS) injection vial 30 Units  30 Units SubCUTAneous Nightly Darrol Bernadette, APRN - CNP   30 Units at 03/15/22 2043      sodium chloride      dextrose         Physical Exam:  BP (!) 112/51   Pulse 99   Temp 97.6 °F (36.4 °C) (Oral)   Resp 16   Ht 5' 10\" (1.778 m)   Wt 180 lb 8.8 oz (81.9 kg)   SpO2 94%   BMI 25.91 kg/m²   Weight change: 8.8 oz (0.249 kg)  Wt Readings from Last 3 Encounters:   03/16/22 180 lb 8.8 oz (81.9 kg)   11/16/21 187 lb (84.8 kg)   11/12/20 187 lb (84.8 kg)     The patient is awake, alert and in no discomfort or distress. No gross musculoskeletal deformity is present. No significant skin or nail changes are present. Gross examination of head, eyes, nose and throat are negative. Jugular venous pressure is normal and no carotid bruits are present. Normal respiratory effort is noted with no accessory muscle usage present. Lung fields are clear to ascultation with diminished breath sounds present throughout left lung fields. Cardiac examination is notable for a regular rate and rhythm with no palpable thrill. No gallop rhythm or cardiac murmur are identified. A benign abdominal examination is present with no masses or organomegaly.  Intact pulses are present throughout all extremities and mild pretibial edema is present. No focal neurologic deficits are present. Intake/Output:    Intake/Output Summary (Last 24 hours) at 3/16/2022 0706  Last data filed at 3/16/2022 0301  Gross per 24 hour   Intake 120 ml   Output 1300 ml   Net -1180 ml     No intake/output data recorded. Laboratory Tests:  Lab Results   Component Value Date    CREATININE 0.7 03/16/2022    BUN 15 03/16/2022     03/16/2022    K 3.8 03/16/2022    CL 97 (L) 03/16/2022    CO2 32 (H) 03/16/2022     Recent Labs     03/14/22  1225   CKTOTAL 48     No results found for: BNP  Lab Results   Component Value Date    WBC 10.7 03/16/2022    RBC 3.19 03/16/2022    HGB 8.8 03/16/2022    HCT 28.8 03/16/2022    MCV 90.3 03/16/2022    MCH 27.6 03/16/2022    MCHC 30.6 03/16/2022    RDW 19.5 03/16/2022     03/16/2022    MPV 9.6 03/16/2022     Recent Labs     03/14/22  1025 03/15/22  0650   ALKPHOS 71 67   ALT 19 15   AST 23 18   PROT 6.3* 5.7*   BILITOT 0.2 <0.2   LABALBU 2.8* 2.2*     Lab Results   Component Value Date    MG 2.0 03/15/2022     Lab Results   Component Value Date    PROTIME 15.3 03/14/2022    INR 1.4 03/14/2022     Lab Results   Component Value Date    TSH 1.75 08/22/2018     No components found for: CHLPL  Lab Results   Component Value Date    TRIG 77 02/17/2020    TRIG 67 02/26/2019    TRIG 60 08/22/2018     Lab Results   Component Value Date    HDL 41 02/17/2020    HDL 42 02/26/2019    HDL 50 08/22/2018     Lab Results   Component Value Date    LDLCALC 63 02/17/2020    LDLCALC 60 02/26/2019    [address] 64 08/22/2018       Cardiac Tests:  Telemetry findings reviewed: sinus rhythm, no new tachy/bradyarrhythmias overnight      ASSESSMENT / PLAN: On clinical basis, the patient remains compensated from cardiovascular standpoint with interim evaluation by the oncology service reviewed and present plans of continued chemotherapy in the face of his metastatic carcinoma.   As outlined, no additional cardiovascular assessment is presently anticipated as results will not alter management in the face of his more significant oncology situation with nutrition essential to continued fluid mobilization and reducing risk of progressive debilitation. Continued appropriate monitoring of his volume status will be necessary as well as that of renal function and electrolytes. We will further evaluate him should additional cardiovascular difficulties or concerns arise. Note: This report was completed utilizing computer voice recognition software. Every effort has been made to ensure accuracy, however; inadvertent computerized transcription errors may be present. Haider Alcala.  Deena Washington, [address] [facility]

## 2022-03-16 NOTE — PROGRESS NOTES
[facility] Progress Note    Admitting Date and Time: 3/14/2022  9:50 AM  Admit Dx: NSTEMI (non-ST elevated myocardial infarction) ([facility] [address] [I21.4]  NSTEMI, initial episode of care [facility]) [I21.4]    Subjective:  Patient is being followed for NSTEMI (non-ST elevated myocardial infarction) ([facility] [address] [I21.4]  NSTEMI, initial episode of care [facility]) [I21.4]     Patient seen and examined 3/16/2022. No acute events overnight. Spouse at bedside. Denies pain, shortness of breath, chest pain or pressure.      nystatin  5 mL Oral 4x Daily    dexamethasone  4 mg Oral BID    aspirin  81 mg Oral Daily    atorvastatin  80 mg Oral Daily    ferrous sulfate  325 mg Oral Daily    FLUoxetine  10 mg Oral Daily    folic acid  1 mg Oral Daily    levothyroxine  50 mcg Oral Daily    pantoprazole  40 mg Oral Daily    sodium chloride flush  5-40 mL IntraVENous 2 times per day    enoxaparin  40 mg SubCUTAneous Daily    insulin lispro  0-12 Units SubCUTAneous TID WC    insulin lispro  0-6 Units SubCUTAneous Nightly    ceFAZolin 2000 mg in 20 mL SWFI IV Syringe  2,000 mg IntraVENous Q8H    insulin glargine  30 Units SubCUTAneous Nightly     magnesium hydroxide, 30 mL, Daily PRN   And  docusate sodium, 200 mg, Daily PRN  ammonium lactate, , BID PRN  oxyCODONE, 5 mg, Q4H PRN  sodium chloride flush, 5-40 mL, PRN  sodium chloride, 25 mL, PRN  ondansetron, 4 mg, Q8H PRN   Or  ondansetron, 4 mg, Q6H PRN  polyethylene glycol, 17 g, Daily PRN  acetaminophen, 650 mg, Q6H PRN   Or  acetaminophen, 650 mg, Q6H PRN  glucose, 15 g, PRN  glucagon (rDNA), 1 mg, PRN  dextrose, 100 mL/hr, PRN  dextrose bolus (hypoglycemia), 125 mL, PRN   Or  dextrose bolus (hypoglycemia), 250 mL, PRN       Objective:    BP (!) 103/49   Pulse 98   Temp 98.1 °F (36.7 °C) (Oral)   Resp 16   Ht 5' 10\" (1.778 m)   Wt 180 lb 8.8 oz (81.9 kg)   SpO2 93%   BMI 25.91 kg/m²     General Appearance: alert and oriented to person, place and time  Skin: warm and dry  Head: normocephalic and atraumatic  Eyes: pupils equal, round, and reactive to light, extraocular eye movements intact, conjunctivae normal  Neck: neck supple and non tender without mass   Pulmonary/Chest: clear to auscultation bilaterally- no wheezes, rales or rhonchi, normal air movement, no respiratory distress  Cardiovascular: normal rate, normal S1 and S2 and no carotid bruits  Abdomen: soft, non-tender, non-distended, normal bowel sounds  Extremities: no cyanosis, no clubbing   Neurologic: no cranial nerve deficit and speech normal    Recent Labs     03/14/22  1025 03/14/22  1025 03/14/22  1630 03/15/22  0650 03/16/22  0515     --   --  136 135   K 3.4*  --   --  3.5 3.8   CL 94*  --   --  96* 97*   CO2 32*  --   --  32* 32*   BUN 20  --   --  18 15   CREATININE 0.9  --   --  0.8 0.7   GLUCOSE 110*   < > 104 165* 135*   CALCIUM 8.3*  --   --  8.2* 8.6    < > = values in this interval not displayed. Recent Labs     03/14/22  1025 03/15/22  0650 03/16/22  0515   WBC 7.6 7.0 10.7   RBC 3.33* 3.09* 3.19*   HGB 9.2* 8.6* 8.8*   HCT 30.0* 27.8* 28.8*   MCV 90.1 90.0 90.3   MCH 27.6 27.8 27.6   MCHC 30.7* 30.9* 30.6*   RDW 19.6* 19.7* 19.5*    349 370   MPV 9.4 9.7 9.6     This is a pleasant 79year-old male who [facility] with a chief complaint of generalized weakness and increased shortness of breath. Patient was subsequently diagnosed with right lower extremity cellulitis, venous ultrasound negative for VTE, blood cultures negative. Patient was placed on IV Ancef. Still has significant erythema to the right lower extremity 3/16/2022. 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 Patient was seen and evaluated by oncology and next chemotherapy is 3/23/2022. Patient has been evaluated by hematology oncology during this admission.   Patient also had elevated troponins without EKG changes, therefore was seen and evaluated by cardiology, no plan for invasive work-up. Spouse is interested in skilled nursing facility placement. Assessment:    Principal Problem:  NSTEMI (non-ST elevated myocardial infarction) ([address] type II  Active Problems:  Physical deconditioning  Chronic anemia  Metabolic alkalosis  History of lung malignancy  History of chemotherapy  History of diabetes mellitus type 2  History of benign essential hypertension  History of hyperlipidemia  History of hypothyroidism  History of prostate cancer status post radiation    Plan:  1. Cardiology following. No plan for invasive work-up. Determined to be type II NSTEMI. Continue statin. 2.  Continue Ancef. Follow cultures. Lower extremity still with significant erythema. 3.  Oncology following. Next chemotherapy 3/23/2022 per patient. 4.  PT OT. Spouse desires skilled nursing facility placement. Disposition; case management for referral to skilled nursing facilities. NOTE: This report was transcribed using voice recognition software. Every effort was made to ensure accuracy; however, inadvertent computerized transcription errors may be present.   Electronically signed by Coreen Arita MD on 3/16/2022 at 10:58 AM

## 2022-03-16 NOTE — PROGRESS NOTES
[facility] Quality Flow/Interdisciplinary Rounds Progress Note        Quality Flow Rounds held on March 16, 2022    Disciplines Attending:  Bedside Nurse, ,  and Nursing Unit Jenni was admitted on 3/14/2022  9:50 AM    Anticipated Discharge Date:  Expected Discharge Date: 03/25/22    Disposition:    Eliecer Score:  Eliecer Scale Score: 17    Readmission Risk              Risk of Unplanned Readmission:  18           Discussed patient goal for the day, patient clinical progression, and barriers to discharge. The following Goal(s) of the Day/Commitment(s) have been identified:  Check Hem-Oc plan.       Aaron Vernon RN  March 16, 2022

## 2022-03-17 NOTE — DISCHARGE SUMMARY
[facility] Physician Discharge Summary       MD Hollie Buenrostro [address] [facility]  [phone]    Schedule an appointment as soon as possible for a visit      [place], [id number] [address] [facility] [address]  [facility]/ Hem/onc    Call in 1 week        Activity level: As tolerated     Dispo: dick- [address] [facility]    Condition on discharge: Stable     Patient ID:  Joseph Segura  [id number]  79 y.o.  1940    Admit date: 3/14/2022    Discharge date and time:  3/17/2022  4:11 PM    Admission Diagnoses:   Principal Problem:    NSTEMI (non-ST elevated myocardial infarction) [facility])  Active Problems:    NSTEMI, initial episode of care [facility])  Resolved Problems:    * No resolved hospital problems. *      Discharge Diagnoses:   Principal Problem:    NSTEMI (non-ST elevated myocardial infarction) [facility])  Active Problems:    NSTEMI, initial episode of care [facility])  Resolved Problems:    * No resolved hospital problems. *      Consults:  IP CONSULT TO CARDIOLOGY  IP CONSULT TO PALLIATIVE CARE  IP CONSULT TO ONCOLOGY    Hospital Course:   Patient Joseph Segura is a 79 y.o. presented with NSTEMI (non-ST elevated [facility]) ([facility] [I21.4]  NSTEMI, initial episode of care [facility]) [I21.4]   Patient presented to the ER with generalized weakness and increased sob of breath. He was followed by palliative medicine, hem/onc, and cardiology. He was treated for;    1.  NSTEMI:  Pt presented to the ER with generalized weakness, increased sob, lower ext edema. Difficulty getting out of bathroom. EMS called. H/o prostate cancer and stage IV lung cancer. Troponin elevated. 297--> 261. EKG with ST changes. Cardiology was consulted. Per cardio no plan for invasive work up.      2. RLE cellulitis. Ultrasound negative. BC negative. Pt started on ancef- continue for now- overall much improved per pt.  Mild erythema on exam. Aspen Natarajan can transition to po abx soon. Change to keflex on dc for a total of 7 days.      3. 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 PT currently receiving chemotherapy.      4. Prostate cancer: s/p radiation     5. DM II: continue basal insulin and ssi. Check hga1c. Blood sugars running high- on decadron for appetite.     6. HLD: statin     7. Hypothyroid disease: synthroid      8. Deconditioning: PT/OT- am pac 8- pt/ wife now interested in [employer] work able to set up.      9. Constipation: bowel regimen      10. oral candida: nystatin     11. Anemia; likely due to chemo/ chronic disease: monitor H& H         Pt was accepted at [facility]. He was then discharged in stable condition with the following medications, instructions and follow up. Discharge Exam:  General Appearance: alert and oriented to person, place and time and in no acute distress  Skin: warm and dry  Head: normocephalic and atraumatic  Neck: neck supple and non tender without mass   Pulmonary/Chest: clear to auscultation bilaterally-  Cardiovascular: normal rate, normal S1 and S2 and no carotid bruits  Abdomen: soft, non-tender, non-distended, normal bowel sounds  Extremities: no cyanosis, no clubbing and right leg mild erythema   Neurologic: speech normal   I/O last 3 completed shifts:  In: -   Out: 1450 [Urine:1450]  I/O this shift:  In: 250 [P.O.:240;  I.V.:10]  Out: -       LABS:  Recent Labs     03/15/22  0650 03/16/22  0515 03/17/22  0444    135 137   K 3.5 3.8 4.0   CL 96* 97* 100   CO2 32* 32* 28   BUN 18 15 17   CREATININE 0.8 0.7 0.7   GLUCOSE 165* 135* 278*   CALCIUM 8.2* 8.6 8.3*       Recent Labs     03/15/22  0650 03/16/22  0515 03/17/22  0444   WBC 7.0 10.7 10.8   RBC 3.09* 3.19* 3.20*   HGB 8.6* 8.8* 8.6*   HCT 27.8* 28.8* 28.7*   MCV 90.0 90.3 89.7   MCH 27.8 27.6 26.9   MCHC 30.9* 30.6* 30.0*   RDW 19.7* 19.5* 19.8*    370 391   MPV 9.7 9.6 9.7       No results for input(s): POCGLU in the last 72 hours. Imaging:  XR CHEST PORTABLE    Result Date: 3/14/2022  EXAMINATION: ONE XRAY VIEW OF THE CHEST 3/14/2022 9:16 am COMPARISON: 12 January 2022 HISTORY: ORDERING SYSTEM PROVIDED HISTORY: shortness of breath TECHNOLOGIST PROVIDED HISTORY: Reason for exam:->shortness of breath FINDINGS: Implanted central venous catheter on the left as before. There is redemonstrated tumor mass in the left upper lung which has been previously demonstrated to extend into the mediastinum. There is now volume loss on the left compatible with substantial left lower lobe collapse in there may be pleural effusion as well. Left lung tumor mass with new left lower lobe collapse and possible pleural effusion. CTA PULMONARY W CONTRAST    Result Date: 3/14/2022  EXAMINATION: CTA OF THE CHEST 3/14/2022 11:48 am TECHNIQUE: CTA of the chest was performed after the administration of intravenous contrast.  Multiplanar reformatted images are provided for review. MIP images are provided for review. Dose modulation, iterative reconstruction, and/or weight based adjustment of the mA/kV was utilized to reduce the radiation dose to as low as reasonably achievable. COMPARISON: 10/15/2021 HISTORY: ORDERING SYSTEM PROVIDED HISTORY: eval for pulmonary embolism; left pleural effusion; hx of lung cancer TECHNOLOGIST PROVIDED HISTORY: Reason for exam:->eval for pulmonary embolism; left pleural effusion; hx of lung cancer Decision Support Exception - unselect if not a suspected or confirmed emergency medical condition->Emergency Medical Condition (MA) FINDINGS: Pulmonary Arteries: Right pulmonary arteries are adequately opacified. No filling defects to suggest pulmonary embolism. The left main pulmonary artery is mildly attenuated by a left suprahilar and posterior hilar bilobed soft tissue mass which is increased significantly in size since the prior study.   There are no filling defects in the left pulmonary artery and no evidence of pulmonary artery occlusion. Mediastinum: There is a large left suprahilar bilobed soft tissue mass but no evidence of focal mediastinal lymphadenopathy. No axillary adenopathy detected. Thyroid gland is normal.  Heart appears normal in size. Aorta is nonaneurysmal. Lungs/pleura: There is bronchial occlusion and fluid-filled bronchi with atelectasis involving the left lower lobe. There is a small left effusion. Elevation of the left hemidiaphragm is present. Right lung appears generally clear. No right-sided effusion. Minimal right medial middle lobe atelectasis. The left suprahilar mass measures 7.8 x 6.4 cm on axial image 69 series 302. The mass is contiguous with a posterior hilar soft tissue mass measuring 3.6 x 3.3 cm on image 116 series 302. Total vertical extent is 9.8 cm on coronal views. Upper Abdomen: New adrenal metastasis on the left measuring 3.2 x 3.1 cm on axial image 237 series 302. Unchanged left renal cortical cyst. Soft Tissues/Bones: No acute bone or soft tissue abnormality. 1.  No evidence of pulmonary embolism 2. Significant interval increase in size of left lung suprahilar and hilar masses. There is a large confluent bilobed mass now measuring approximately 7.8 x 6.4 x 9.8 cm. 3.  Mild fusiform narrowing of the left pulmonary artery. No pulmonary artery occlusion or filling defect. 4.  Atelectasis of the left lower lobe with compressive occlusion of the left lower lobe bronchus. Small left effusion. 5.  Lobular metastasis involving the left adrenal gland measuring 3.2 x 3.1 cm on axial images. Mildly increased in size compared to 01/21/2022.      US DUP LOWER EXTREMITIES BILATERAL VENOUS    Result Date: 3/14/2022  EXAMINATION: DUPLEX VENOUS ULTRASOUND OF THE BILATERAL LOWER EXTREMITIES3/14/2022 2:40 pm TECHNIQUE: Duplex ultrasound using B-mode/gray scaled imaging, Doppler spectral analysis and color flow Doppler was obtained of the deep venous structures of the lower bilateral extremities. COMPARISON: Right lower extremity Doppler, 06/28/2019 HISTORY: ORDERING SYSTEM PROVIDED HISTORY: eval for dvt TECHNOLOGIST PROVIDED HISTORY: Reason for exam:->eval for dvt What reading provider will be dictating this exam?->CRC FINDINGS: Soft tissue edema is seen like bilaterally. The peroneal veins could not be visualized on either side. The visualized veins of the bilateral lower extremities are patent and free of echogenic thrombus. The veins demonstrate good compressibility with normal color flow study and spectral analysis. 1.  No evidence of DVT in either lower extremity. 2. The peroneal veins could not be visualized on either side.  RECOMMENDATIONS: Unavailable       Patient Instructions:      Medication List      START taking these medications    cephALEXin 500 MG capsule  Commonly known as: KEFLEX  Take 1 capsule by mouth 4 times daily for 4 days     dexamethasone 4 MG tablet  Commonly known as: DECADRON  Take 0.5 tablets by mouth in the morning and at bedtime for 14 days Taken for cancer associated anorexia  Start taking on: March 18, 2022     insulin glargine 100 UNIT/ML injection vial  Commonly known as: LANTUS  Inject 30 Units into the skin nightly  Replaces: Basaglar KwikPen 100 UNIT/ML injection pen     insulin lispro 100 UNIT/ML injection vial  Commonly known as: HUMALOG  Inject 0-12 Units into the skin 3 times daily (with meals)  Replaces: HumaLOG KwikPen 200 UNIT/ML Sopn pen     magnesium hydroxide 400 MG/5ML suspension  Commonly known as: Milk of Magnesia  Take 30 mLs by mouth daily     nystatin 256402 UNIT/ML suspension  Commonly known as: MYCOSTATIN  Take 2 mLs by mouth 4 times daily for 5 days     sennosides-docusate sodium 8.6-50 MG tablet  Commonly known as: SENOKOT-S  Take 2 tablets by mouth nightly        CHANGE how you take these medications    furosemide 40 MG tablet  Commonly known as: LASIX  Take 0.5 tablets by mouth daily  What changed: how much to take     oxyCODONE 5 MG immediate release tablet  Commonly known as: ROXICODONE  Take 1 tablet by mouth every 6 hours as needed for Pain for up to 4 days. What changed:   · when to take this  · reasons to take this        CONTINUE taking these medications    ammonium lactate 12 % cream  Commonly known as: AMLACTIN     aspirin 81 MG tablet     CO Q 10 PO     Contour Next Test strip  Generic drug: blood glucose test strips     ferrous sulfate 325 (65 Fe) MG tablet  Commonly known as: IRON 325     FLUoxetine 10 MG capsule  Commonly known as: PROZAC  TAKE ONE CAPSULE BY MOUTH DAILY     folic acid 1 MG tablet  Commonly known as: FOLVITE     levothyroxine 25 MCG tablet  Commonly known as: SYNTHROID     Lipitor 80 MG tablet  Generic drug: atorvastatin     LORazepam 0.5 MG tablet  Commonly known as: Ativan  Take 1 tablet by mouth 2 times daily as needed for Anxiety for up to 30 days.      Microlet Lancets Misc     pantoprazole 40 MG tablet  Commonly known as: PROTONIX     Tylenol PM Extra Strength  MG tablet  Generic drug: diphenhydrAMINE-APAP (sleep)        STOP taking these medications    Basaglar KwikPen 100 UNIT/ML injection pen  Generic drug: insulin glargine  Replaced by: insulin glargine 100 UNIT/ML injection vial     HumaLOG KwikPen 200 UNIT/ML Sopn pen  Generic drug: insulin lispro  Replaced by: insulin lispro 100 UNIT/ML injection vial     hydrocortisone 5 MG tablet  Commonly known as: CORTEF     megestrol 40 MG/ML suspension  Commonly known as: MEGACE           Where to Get Your Medications      These medications were sent to [address] [place] [address]    Phone: [phone]   · dexamethasone 4 MG tablet  · magnesium hydroxide 400 MG/5ML suspension  · nystatin 309747 UNIT/ML suspension  · sennosides-docusate sodium 8.6-50 MG tablet     You can get these medications from

## 2022-03-17 NOTE — PROGRESS NOTES
Palliative Care Department  [phone]  Palliative Care Progress Note  Provider ISHMAEL Augustin CNP     Tiffanie Fernández  [id number]  Hospital Day: 4  Date of Initial Consult: 3/15/2022  Referring Provider: ISHMAEL Agosto CNP  Palliative Medicine was consulted for assistance with: Goals of Care, Code Status Discussion, Family Support, Symptom Management    HPI:   Tiffanie Fernández is a 79 y.o. with a medical history of DM, depression, HLD, HTN, hypothyroidism, peripheral neuropathy, prostate CA s/p radiation, stage IV lung CA who was admitted on 3/14/2022 from home with a CHIEF COMPLAINT of shortness of breath and BLE edema. ED work-up significant for: BUN 20, creatinine 0.9, calcium 8.3, proBNP 354, troponin 297-261, WBC 7.6, hemoglobin 9.2, hematocrit 30.0, albumin 2.8. Imaging significant for significant interval increase in left lung suprahilar and hilar masses, large confluent bilobed mass, atelectasis of the left lower lobe with compressive occlusion of the left lower lobe bronchus, small left effusion, and lobular metastasis involving the left adrenal gland. Patient was admitted for further management. Palliative medicine consulted for goals of care, CODE STATUS discussion, family support, and symptom management. ASSESSMENT/PLAN:     Pertinent Hospital Diagnoses      Metastatic adenocarcinoma of the lung      Palliative Care Encounter / Counseling Regarding Goals of Care  Please see detailed goals of care discussion as below   At this time, Tiffanie Fernández, Does have capacity for medical decision-making.   Capacity is time limited and situation/question specific   Outcome of goals of care meeting: Change code status to limited code, continue current management   Code status Limited no intubation or compressions only; okay for defibrillation, okay for resuscitative medications    Advanced Directives: no POA or living will in [facility]   Surrogate/Legal NOK:  o Emily Hernandez spouse, [phone], [phone]    Bowel regimen:  -Patient requests home regimen be restarted: colace 2 capsules + milk of magnesia daily PRN    Spiritual assessment: no spiritual distress identified  Bereavement and grief: to be determined  Referrals to: none today  SUBJECTIVE:     Current medical issues leading to Palliative Medicine involvement include   Active Hospital Problems    Diagnosis Date Noted    NSTEMI (non-ST elevated myocardial infarction) ([facility] [I21.4] 03/14/2022    NSTEMI, initial episode of care [facility]) [I21.4] 03/14/2022       Details of Conversation:  Chart reviewed and patient seen. Patient is resting in bed, spouse at bedside. Patient states his last bowel movement was 2 days ago. He is requesting to have milk of magnesia and Colace. Patient's wife states discharge plan is to facility for rehab and continue with chemotherapy. Patient and spouse deny needs from palliative medicine at this time. Will follow.     OBJECTIVE:   Prognosis: unknown    Physical Exam:  /67   Pulse 96   Temp 98.3 °F (36.8 °C) (Oral)   Resp 22   Ht 5' 10\" (1.778 m)   Wt 179 lb 3.7 oz (81.3 kg)   SpO2 94%   BMI 25.72 kg/m²   Constitutional:  Elderly,  NAD, awake, alert  Eyes: no scleral icterus, normal lids, no discharge  ENMT:  Normocephalic, atraumatic, mucosa moist, EOMI  Neck:  trachea midline, no JVD  Lungs:  RA, easy and unlabored respirations   Heart: RRR  Abd:  Soft, non tender, non distended, bowel sounds present  Ext:  Moving all extremities, + edema, pulses present  Skin:  Warm and dry  Neuro:   Alert, grossly nonfocal; following commands    Objective data reviewed: labs, images, records, medication use, vitals and chart    Discussed patient and the plan of care with the other IDT members: Palliative Medicine IDT Team    Time/Communication  Greater than 50% of time spent, total 15 minutes in counseling and coordination of care at the bedside regarding goals of care, symptom management, diagnosis and prognosis and see above. Thank you for allowing Palliative Medicine to participate in the care of Kobe Browne.

## 2022-03-17 NOTE — PLAN OF CARE
Problem: Falls - Risk of:  Goal: Will remain free from falls  Description: Will remain free from falls  3/16/2022 2335 by Reymundo Saldana RN  Outcome: Met This Shift     Problem: Falls - Risk of:  Goal: Absence of physical injury  Description: Absence of physical injury  3/16/2022 2335 by Reymundo Saldana RN  Outcome: Met This Shift     Problem: Skin Integrity:  Goal: Will show no infection signs and symptoms  Description: Will show no infection signs and symptoms  3/16/2022 2335 by Reymundo Saldana RN  Outcome: Met This Shift     Problem: Skin Integrity:  Goal: Absence of new skin breakdown  Description: Absence of new skin breakdown  3/16/2022 2335 by Reymundo Saldana RN  Outcome: Met This Shift

## 2022-03-17 NOTE — PROGRESS NOTES
Subjective:  Chart reviewed  Patient seen at bedside with wife  He feels better since admission and leg swelling continues to decrease  The patient is awake and alert. No problems overnight  Denies chest pain, angina, dyspnea, abdominal discomfort, nausea/vomiting and diarrhea       Objective:    /67   Pulse 96   Temp 98.3 °F (36.8 °C) (Oral)   Resp 22   Ht 5' 10\" (1.778 m)   Wt 179 lb 3.7 oz (81.3 kg)   SpO2 94%   BMI 25.72 kg/m²     General: NAD, awake and alert  HEENT: normocephalic/atraumatic, mucosa dry, sclera anicteric, conjuntiva pink  NECK: supple, trachea midline  Heart:  sinus rhythm, rapid, no murmurs, gallops, or rubs.   Lungs:  Diminished breath sounds on left, right CTA,, no wheeze, rales or rhonchi anteriorly  Abd: BS present, nontender, nondistended, no masses  Extrem:  Trace edema of LE remaining, overlying skin erythematous on lower legs right side worse than left  is, or edema  Skin: Intact, no petechia or purpura, per extremity exam    CBC with Differential:    Lab Results   Component Value Date    WBC 10.8 03/17/2022    RBC 3.20 03/17/2022    HGB 8.6 03/17/2022    HCT 28.7 03/17/2022     03/17/2022    MCV 89.7 03/17/2022    MCH 26.9 03/17/2022    MCHC 30.0 03/17/2022    RDW 19.8 03/17/2022    LYMPHOPCT 9.9 03/15/2022    MONOPCT 6.9 03/15/2022    EOSPCT 0.0 12/26/2019    BASOPCT 0.3 03/15/2022    MONOSABS 0.48 03/15/2022    LYMPHSABS 0.69 03/15/2022    EOSABS 0.01 03/15/2022    BASOSABS 0.02 03/15/2022     CMP:    Lab Results   Component Value Date     03/17/2022    K 4.0 03/17/2022    K 3.5 03/15/2022     03/17/2022    CO2 28 03/17/2022    BUN 17 03/17/2022    CREATININE 0.7 03/17/2022    GFRAA >60 03/17/2022    LABGLOM >60 03/17/2022    GLUCOSE 278 03/17/2022    PROT 5.6 03/17/2022    LABALBU 2.5 03/17/2022    CALCIUM 8.3 03/17/2022    BILITOT <0.2 03/17/2022    ALKPHOS 74 03/17/2022    AST 18 03/17/2022    ALT 8 03/17/2022          Current Facility-Administered Medications:     magnesium hydroxide (MILK OF MAGNESIA) 400 MG/5ML suspension 30 mL, 30 mL, Oral, Daily PRN, 30 mL at 03/15/22 1123 **AND** docusate sodium (COLACE) capsule 200 mg, 200 mg, Oral, Daily PRN, ISHMAEL Wilcox - CNP    nystatin (MYCOSTATIN) 487533 UNIT/ML suspension 500,000 Units, 5 mL, Oral, 4x Daily, KANDY Carlton, 500,000 Units at 03/17/22 0823    dexamethasone (DECADRON) tablet 4 mg, 4 mg, Oral, BID, KANDY Carlton, 4 mg at 03/17/22 1109    ammonium lactate (LAC-HYDRIN) 12 % lotion, , Topical, BID PRN, ISHMAEL Winters CNP, Given at 03/16/22 2022    aspirin chewable tablet 81 mg, 81 mg, Oral, Daily, ISHMAEL Winters - CNP, 81 mg at 03/17/22 0824    atorvastatin (LIPITOR) tablet 80 mg, 80 mg, Oral, Daily, ISHMAEL Winters - CNP, 80 mg at 03/17/22 8699    ferrous sulfate (IRON 325) tablet 325 mg, 325 mg, Oral, Daily, ISHMAEL Winters - CNP, 325 mg at 03/17/22 7024    FLUoxetine (PROZAC) tablet 10 mg, 10 mg, Oral, Daily, ISHMAEL Winters - CNP, 10 mg at 79/66/31 1382    folic acid (FOLVITE) tablet 1 mg, 1 mg, Oral, Daily, ISHMAEL Winters - CNP, 1 mg at 03/17/22 0927    levothyroxine (SYNTHROID) tablet 50 mcg, 50 mcg, Oral, Daily, ISHMAEL Winters - CNP, 50 mcg at 03/17/22 0523    oxyCODONE (ROXICODONE) immediate release tablet 5 mg, 5 mg, Oral, Q4H PRN, ISHMAEL Winters - CNP, 5 mg at 03/16/22 0315    pantoprazole (PROTONIX) tablet 40 mg, 40 mg, Oral, Daily, ISHMAEL Winters - CNP, 40 mg at 03/17/22 0824    sodium chloride flush 0.9 % injection 5-40 mL, 5-40 mL, IntraVENous, 2 times per day, ISHMAEL Winters - CNP, 10 mL at 03/17/22 0824    sodium chloride flush 0.9 % injection 5-40 mL, 5-40 mL, IntraVENous, PRN, ISHMAEL Winters - CNP    0.9 % sodium chloride infusion, 25 mL, IntraVENous, PRN, ISHMAEL Winters - CNP    enoxaparin (LOVENOX) injection 40 mg, 40 mg, SubCUTAneous, Daily, ISHMAEL Gilliland - CNP, 40 mg at 03/16/22 1704    ondansetron (ZOFRAN-ODT) disintegrating tablet 4 mg, 4 mg, Oral, Q8H PRN **OR** ondansetron (ZOFRAN) injection 4 mg, 4 mg, IntraVENous, Q6H PRN, Diana Cifuentes APRN - CNP    polyethylene glycol (GLYCOLAX) packet 17 g, 17 g, Oral, Daily PRN, Diana Cifuentes APRN - CNP    acetaminophen (TYLENOL) tablet 650 mg, 650 mg, Oral, Q6H PRN, 650 mg at 03/17/22 0400 **OR** acetaminophen (TYLENOL) suppository 650 mg, 650 mg, Rectal, Q6H PRN, ISHMAEL Gilliland - CNP    insulin lispro (HUMALOG) injection vial 0-12 Units, 0-12 Units, SubCUTAneous, TID WC, Mone JACKSON Tokio, DO, 6 Units at 03/17/22 1110    insulin lispro (HUMALOG) injection vial 0-6 Units, 0-6 Units, SubCUTAneous, Nightly, Mone J Tokio, DO, 6 Units at 03/16/22 2025    glucose (GLUTOSE) 40 % oral gel 15 g, 15 g, Oral, PRN, Mone J Tokio, DO    glucagon (rDNA) injection 1 mg, 1 mg, IntraMUSCular, PRN, Mone J Tokio, DO    dextrose 5 % solution, 100 mL/hr, IntraVENous, PRN, Mone J Tokio, DO    dextrose bolus (hypoglycemia) 10% 125 mL, 125 mL, IntraVENous, PRN **OR** dextrose bolus (hypoglycemia) 10% 250 mL, 250 mL, IntraVENous, PRN, Mone J Tokio, DO    ceFAZolin (ANCEF) 2000 mg in sterile water 20 mL IV syringe, 2,000 mg, IntraVENous, Q8H, Mone JACKSON Tokio, DO, 2,000 mg at 03/17/22 0401    insulin glargine (LANTUS) injection vial 30 Units, 30 Units, SubCUTAneous, Nightly, Sulma Millss, APRN - CNP, 30 Units at 03/16/22 2026    US DUP LOWER EXTREMITIES BILATERAL VENOUS   Final Result   1. No evidence of DVT in either lower extremity. 2. The peroneal veins could not be visualized on either side. RECOMMENDATIONS:   Unavailable         CTA PULMONARY W CONTRAST   Final Result   1. No evidence of pulmonary embolism      2. Significant interval increase in size of left lung suprahilar and hilar   masses.   There is a large confluent bilobed mass now measuring approximately   7.8 x 6.4 x 9.8 cm. 3.  Mild fusiform narrowing of the left pulmonary artery. No pulmonary   artery occlusion or filling defect. 4.  Atelectasis of the left lower lobe with compressive occlusion of the left   lower lobe bronchus. Small left effusion. 5.  Lobular metastasis involving the left adrenal gland measuring 3.2 x 3.1   cm on axial images. Mildly increased in size compared to 01/21/2022. XR CHEST PORTABLE   Final Result   Left lung tumor mass with new left lower lobe collapse and possible pleural   effusion. 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 PDL1 expressed TPS 2%, negative for EGFR, BRAF, ALK, ROS1. Treated with Carbo/Taxol from 11/20/19 to 1/2/20 and last RT was on 1/6/20 followed by consolidation immunotherapy Imfinzi (anti-PDL1) from 2/13/20 to 6/4/20. Evidence of disease progression on 6/9/20 consistent with stage IV disease. Treated with 4 cycles of Carbo and Alimta on 6/24/2020 to 8/24/20. Maintenance Alimta from 9/16/20. Had progression. Treated with combination Ipi/Nivo from 6/2/21 with partial response. Now with progression. Started Gemzar on 1/26/2022. Last treatment 3/9/2022 with dose reduction. A/P:  Metastatic adenocarcinoma of the lung - continue gemzar outpatient on discharge  Candidiasis - mild, nystatin orally  Dysgeusia - treat candidiasis  Appetite - decadron 4 mg with breakfast and lunch  Constipation - restart home bowel regimen  Anemia - follow likely secondary to chemotherapy and chronic disease  Iron deficiency - add IV iron today, continue oral iron and can continue parental replacement outpatient    Thank you for allowing us to participate in the care of Hernandez Monk.      Cindy Lion PA-C  [phone]    Electronically signed by KANDY Gan on 3/17/2022 at 11:56 AM    Note: This report was completed using computerize

## 2022-03-17 NOTE — PROGRESS NOTES
Infusion complete. INT flushed per protocol. SUB Q Lovenox administered prior to discharge. Tolerated well. INT removed. Pressure held. Bandaid applied with pressure dressing. Complete discharge instructions printed and given to wife for transport.

## 2022-03-17 NOTE — PROGRESS NOTES
[facility] Progress Note    Admitting Date and Time: 3/14/2022  9:50 AM  Admit Dx: NSTEMI (non-ST elevated myocardial infarction) ([facility] [I21.4]  NSTEMI, initial episode of care ([facility] [I21.4]    Subjective:  Patient is being followed for NSTEMI (non-ST elevated myocardial infarction) ([facility] [I21.4]  NSTEMI, initial episode of care ([facility] [I21.4]     Pt awake and alert resting in bed reading a book in no acute distress  Reporting he feels good  Wife at bedside-asking to talk to   Plans now to go to rehab for therapy/ to get stronger  Right leg feels much better       ROS: denies fever, chills, cp, sob, n/v, HA unless stated above.       nystatin  5 mL Oral 4x Daily    dexamethasone  4 mg Oral BID    aspirin  81 mg Oral Daily    atorvastatin  80 mg Oral Daily    ferrous sulfate  325 mg Oral Daily    FLUoxetine  10 mg Oral Daily    folic acid  1 mg Oral Daily    levothyroxine  50 mcg Oral Daily    pantoprazole  40 mg Oral Daily    sodium chloride flush  5-40 mL IntraVENous 2 times per day    enoxaparin  40 mg SubCUTAneous Daily    insulin lispro  0-12 Units SubCUTAneous TID WC    insulin lispro  0-6 Units SubCUTAneous Nightly    ceFAZolin 2000 mg in 20 mL SWFI IV Syringe  2,000 mg IntraVENous Q8H    insulin glargine  30 Units SubCUTAneous Nightly     magnesium hydroxide, 30 mL, Daily PRN   And  docusate sodium, 200 mg, Daily PRN  ammonium lactate, , BID PRN  oxyCODONE, 5 mg, Q4H PRN  sodium chloride flush, 5-40 mL, PRN  sodium chloride, 25 mL, PRN  ondansetron, 4 mg, Q8H PRN   Or  ondansetron, 4 mg, Q6H PRN  polyethylene glycol, 17 g, Daily PRN  acetaminophen, 650 mg, Q6H PRN   Or  acetaminophen, 650 mg, Q6H PRN  glucose, 15 g, PRN  glucagon (rDNA), 1 mg, PRN  dextrose, 100 mL/hr, PRN  dextrose bolus (hypoglycemia), 125 mL, PRN   Or  dextrose bolus (hypoglycemia), 250 mL, PRN         Objective:    /67   Pulse 96   Temp 98.3 °F (36.8 °C) (Oral)   Resp 22   Ht 5' 10\" (1.778 m)   Wt 179 lb 3.7 oz (81.3 kg)   SpO2 94%   BMI 25.72 kg/m²   General Appearance: alert and oriented to person, place and time and in no acute distress  Skin: warm and dry  Head: normocephalic and atraumatic  Neck: neck supple and non tender without mass   Pulmonary/Chest: clear to auscultation bilaterally-  Cardiovascular: normal rate, normal S1 and S2 and no carotid bruits  Abdomen: soft, non-tender, non-distended, normal bowel sounds  Extremities: no cyanosis, no clubbing and right leg mild erythema   Neurologic: speech normal         Recent Labs     03/15/22  0650 03/16/22  0515 03/17/22  0444    135 137   K 3.5 3.8 4.0   CL 96* 97* 100   CO2 32* 32* 28   BUN 18 15 17   CREATININE 0.8 0.7 0.7   GLUCOSE 165* 135* 278*   CALCIUM 8.2* 8.6 8.3*       Recent Labs     03/15/22  0650 03/16/22  0515 03/17/22  0444   WBC 7.0 10.7 10.8   RBC 3.09* 3.19* 3.20*   HGB 8.6* 8.8* 8.6*   HCT 27.8* 28.8* 28.7*   MCV 90.0 90.3 89.7   MCH 27.8 27.6 26.9   MCHC 30.9* 30.6* 30.0*   RDW 19.7* 19.5* 19.8*    370 391   MPV 9.7 9.6 9.7         Assessment:    Principal Problem:    NSTEMI (non-ST elevated myocardial infarction) ([facility])  Active Problems:    NSTEMI, initial episode of care [facility])  Resolved Problems:    * No resolved hospital problems. *      Plan:  1. NSTEMI:  Pt presented to the ER with generalized weakness, increased sob, lower ext edema. Difficulty getting out of bathroom. EMS called. H/o prostate cancer and stage IV lung cancer. Troponin elevated. 297--> 261. EKG with ST changes. Cardiology was consulted. Per cardio no plan for invasive work up. 2. RLE cellulitis. Ultrasound negative. BC negative. Pt started on ancef- continue for now- overall much improved per pt. Mild erythema on exam. Jorge A can transition to po abx soon. 3. 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 PT currently receiving chemotherapy.      4. Prostate cancer: s/p radiation    5. DM II: continue basal insulin and ssi. Check hga1c. Blood sugars running high- on decadron for appetite. 6. HLD: statin    7. Hypothyroid disease: synthroid     8. Deconditioning: PT/OT- am pac 8- pt/ wife now interested in [address]    9. Constipation: bowel regimen     10. oral candida: nystatin    11. Anemia; likely due to chemo/ chronic disease: monitor H& H        Dispo: Plan is now [facility] on discharge. Social work looking into. Pt being on active chemo does complicate [facility] stay. NOTE: This report was transcribed using voice recognition software. Every effort was made to ensure accuracy; however, inadvertent computerized transcription errors may be present.   Electronically signed by HERBER Campos Caro on 3/17/2022 at 9:55 AM

## 2022-03-17 NOTE — PROGRESS NOTES
Occupational Therapy  OT BEDSIDE TREATMENT NOTE      Date:3/17/2022  Patient Name: Anirudh Balderrama  MRN: [id number]  : 1940  Room: [address] [place], [place] [address]       Evaluating OT: Tyrone Byrd [address] [place] [address], OTR/L #[id number]     Referring Provider: ISHMAEL Madison CNP  Specific Provider Orders/Date: eval and treat 3/14/2022     Diagnosis: NSTEMI (non-ST elevated myocardial infarction) [facility]) [I21.4]  NSTEMI, initial episode of care [facility]) [I21.4]       Pertinent Medical History:   Past Medical History        Past Medical History:   Diagnosis Date    Diabetes mellitus without complication ([facility]      Epiretinal membrane, left eye 10/2019    Full dentures      History of depression      Hyperlipidemia      Hypertension      Hypothyroidism      Peripheral neuropathy       feet, affects balance at times    [facility] ([facility]       treated with radiation    Use of cane as ambulatory aid              Precautions:  fall risk, alarm     Assessment of current deficits   [x]? Functional mobility             [x]?ADLs           [x]? Strength                  []?Cognition   [x]? Functional transfers           [x]? IADLs         [x]? Safety Awareness   [x]? Endurance   []? Fine Coordination              [x]? Balance      []? Vision/perception   []? Sensation     []? Gross Motor Coordination  []? ROM           []?  Delirium                   []? Motor Control      OT PLAN OF CARE   OT POC based on physician orders, patient diagnosis and results of clinical assessment     Frequency/Duration 2-5 days/wk for 10-14 days PRN   Specific OT Treatment Interventions to include:   * Instruction/training on adapted ADL techniques and AE recommendations to increase functional independence within precautions       * Training on energy conservation strategies, correct breathing pattern and techniques to improve independence/tolerance for self-care routine  * Functional transfer/mobility training/DME recommendations for increased independence, safety, and fall prevention  * Patient/Family education to increase follow through with safety techniques and functional independence  * Recommendation of environmental modifications for increased safety with functional transfers/mobility and ADLs  * Therapeutic exercise to improve motor endurance, ROM, and functional strength for ADLs/functional transfers  * Therapeutic activities to facilitate/challenge dynamic balance, stand tolerance for increased safety and independence with ADLs     Recommended Adaptive Equipment: TBD     Home Living: Pt lives with wife in a 1 story home with 3 ERNESTINE. Bathroom setup: walk in shower   Equipment Owned: tub bench, wheel chair, walker cane     Prior Level of Function: assist with ADLs, assist with IADLs. Completed functional mobility with wheelchair propelled with assistance     Pain Level: Pt reports he has pain when sitting for even short periods of time.      Cognition: Awake and alert. Cues for safety. Functional Assessment: AM-PAC Daily Activity Raw Score: 15/24    Initial Eval Status  Date: 3/15/2022 Treatment session:   3/17/22  STGs=LTGS  Timeframe 10-14 days      Feeding  Min A  Wife assist with cup/straw management during feeding task       Grooming  Min A at seated level d/t decreased standing tolerance Min A    Mod I with use of AD as needed   UB Dressing  Min A    Mod I with use of AD as needed   LB Dressing Max A Max  A to don slipper socks. Max A to thread brief over feet. Mod A to arrange over hips when standing.      Min A with use of AD as needed   Bathing Max A   Min A     Toileting Max A  Assist with pericare from [facility], pt demonstrated decreased standing tolerance during pericare-unable to achieve full upright standing posture  max A   Incontinent of urine.   Min A   Bed Mobility  Supine to sit: Mod A    Mod A supine <> sit   SBA   Functional Transfers  Mod A x2   Stand pivot only   bed to [facility]     Max A x2   Stand pivot only  BSC to chair Mod A stand pivot from bed to chair. Pt simulated technique he does when transferring to and from w/c at home.   Min A with AD as needed    Functional Mobility NA  Pt reports he does not walk at home  Pt wife reports use of WC not self propelled       Balance Sitting:        Static: fair        Dynamic: fair     Standing: fair-   Sitting:        Static: good       Dynamic: good     Standing: good   Activity Tolerance fair  Pt limited by overall decreased endurance  Fair   good during ADL activity. Pt will verbalize and demonstrate good understanding of ECWS techniques       Comments:  Pt pleasant and cooperative. Min A stand balance during functional activity. Unsteady when standing however increased ability from initial evaluation. Pt initially agreeable to sit in the chair however changed his mind. States he is too uncomfortable in the chair. Returned to bed and positioned to comfort. Education/treatment:  ADL retraining with facilitation of movement to increase self care skills. Therapeutic activity to address balance and endurance for ADL and transfers. Pt education of transfer safety. · Pt has made  progress towards set goals.        Time In: 1:29   Time Out: 1:54     Min Units   Therapeutic Ex 32476     Therapeutic Activities 04151 17 1   ADL/Self Care 62435 14 1   Orthotic Management [id number]     Neuro Re-Ed [id number]     Non-Billable Time     TOTAL TIMED TREATMENT [facility] BLANE/L [id number]

## 2022-03-17 NOTE — PROGRESS NOTES
Physical Therapy  Facility/Department: [facility] SURG  Daily Treatment Note  NAME: Valente Uriostegui  : 1940  MRN: [id number]    Date of Service: 3/17/2022    Patient Diagnosis(es): The primary encounter diagnosis was NSTEMI (non-ST elevated myocardial infarction) ([facility] Diagnoses of Bilateral lower extremity edema, Shortness of breath, Lung mass, Atelectasis of left lung, and Cancer ([facility] were also pertinent to this visit. has a past medical history of Diabetes mellitus without complication ([facility] Epiretinal membrane, left eye, Full dentures, History of depression, Hyperlipidemia, Hypertension, Hypothyroidism, Peripheral neuropathy, Prostate cancer ([facility] and Use of cane as ambulatory aid. has a past surgical history that includes eye surgery; Appendectomy; Circumcision; vitrectomy (Left, 2019); Cataract removal with implant (Bilateral); Colonoscopy; vitrectomy (Left, 10/10/2019); Tooth Extraction; and bronchoscopy (N/A, 10/24/2019). Evaluating Therapist: Tyler Roberts PT      Referring Provider:  ISHMAEL Alvarez CNP     PT order : PT eval and treat      Room #:  616   DIAGNOSIS:  NSTEMI      PRECAUTIONS: falls     Social:  Pt lives with  Wife  in a  1  floor plan  3 steps and 1 rails to enter. Prior to admission: pt's wife reports she assists with transfers to a w/c and she pushes the w/c        Initial Evaluation  Date: 3/15/2022  Treatment     3/17 Short Term/ Long Term   Goals   Was pt agreeable to Eval/treatment? yes   yes     Does pt have pain?  none reported   none reported     Bed Mobility  Rolling:  NT   Supine to sit:  Max assist   Sit to supine:  NT   Scooting:  Max assist   rolling: Mod A  Supine to sit:  Mod A  Sit to supine: Mod A  Scooting: Mod A to sitting EOB  min assist     Transfers Sit to stand: mod assist x 2   Stand to sit:  Mod assist   Stand pivot:  Mod/max assist x 2   sit to stand: Mod A  Stand to sit:  Mod A  Stand pivot:   Mod A without device  mod assist    Ambulation   NT   NT N/A    LE ROM  AAROM WFL        LE strength  3- to 4-/ 5    Increase by 1-2/ 3 MMT grade    AM- PAC RAW score   8/ 24   10/24             Therapeutic Exercises:   LAQs while sitting EOB    Patient education  Pt educated on PT objectives during treatment session, posture while sitting EOB, safety during mobility. Patient response to education:   Pt verbalized understanding Pt demonstrated skill Pt requires further education in this area   yes With cueing yes     ASSESSMENT:    Comments:  Pt found and left in bed with call light in reach and bed alarm on. Treatment:  Patient practiced and was instructed in the following treatment:    Functional mobility and therapeutic exercises performed as documented above. No report of dizziness during functional mobility. Cueing required for upright posture when sitting EOB. Pt performed stand pivot transfers twice during treatment session. PLAN:    Patient is making good progress towards established goals. Will continue with current POC.      Time in  1330  Time out  1354    Total Treatment Time  14 minutes     CPT codes:    [x] Therapeutic activities 28471 10minutes  [] Therapeutic exercises [address] 4minutes      Antonio Fall, [address]

## 2022-03-17 NOTE — PROGRESS NOTES
Report called to Miguelito. Cornelia Rossi, nurse on unit received verbal report. All questions and concerns addressed. [phone]  Monitor removed. Transport via wife after infusion complete this evening. All reports and packet of information for discharge completed.

## 2022-03-17 NOTE — CARE COORDINATION
Social work / Discharge Planning:       Social work spoke to liaison for [facility] for update regarding referral.    They need to confirm the cost of chemo drug from the [facility] before they can accept patient. They are contacting the [facility] again to try to get an answer. Social work updated patient. Electronically signed by WHITNEY Lino on 3/17/2022 at 2:44 PM           Patient has been accepted by [facility]. No precert needed. Awaiting negative covid test.    AYAKA and [id number] completed. Patient's wife will transport. RN updated.    Electronically signed by WHITNEY Lino on 3/17/2022 at 3:59 PM

## 2022-03-17 NOTE — DISCHARGE INSTR - COC
Continuity of Care Form    Patient Name: Alexander Yanez   :  1940  MRN:  [id number]    Admit date:  3/14/2022  Discharge date:  ***    Code Status Order: Limited   Advance Directives:      Admitting Physician:  Kristi Gunderson DO  PCP: Bianca Chi DO    Discharging Nurse: [facility] Unit/Room#: 3291/7131-Q  Discharging Unit Phone Number: ***    Emergency Contact:   Extended Emergency Contact Information  Primary Emergency Contact: [facility]  Address: [address], [address] Phone: [phone]  Mobile Phone: [phone]  Relation: Spouse  Secondary Emergency Contact: Lainey Ross  [place] Phone: [phone]  Relation: Brother/Sister   needed?  No    Past Surgical History:  Past Surgical History:   Procedure Laterality Date    APPENDECTOMY      BRONCHOSCOPY N/A 10/24/2019    BRONCHOSCOPY WITH ANESTHESIA (CYTOLOGY NOTIFIED) performed by Silvina Zheng MD at [facility] Bilateral     CIRCUMCISION      COLONOSCOPY      EYE SURGERY      eyelids    TOOTH EXTRACTION      full mouth    VITRECTOMY Left 2019    PARS PLANA VITRECTOMY 25 GAUGE MACULAR PUCKER REPAIR AIR FLUID EXCHANGE LEFT EYE performed by Brad Almanzar MD at [facility] OR    VITRECTOMY Left 10/10/2019    PARS PLANA VITRECTOMY 25 GAUGE INTERNAL LIMITING MEMBRANE PEEL OZURDEX INJECTION  LEFT YE E performed by Brad Almanzar MD at [facility]       Immunization History:   Immunization History   Administered Date(s) Administered    Influenza Virus Vaccine 10/28/2007, 2015, 10/04/2016    Influenza, High Dose (Fluzone 65 yrs and older) 10/11/2017    Influenza, Triv, inactivated, subunit, adjuvanted, IM (Fluad 65 yrs and older) 10/03/2018, 2019    Pneumococcal Conjugate 13-valent ([id number]) 10/02/2018    Pneumococcal Polysaccharide (Hyrtlxqzw69) 2017    Tdap (Boostrix, Adacel) 2016       Active Problems:  Patient Active Problem List   Diagnosis Code    Essential hypertension, benign I10    Diabetes mellitus ([facility] E11.9    Hyperlipidemia E78.5    Malignant neoplasm of upper lobe of left lung (HCC) C34.12    NSTEMI (non-ST elevated myocardial infarction) ([facility] I21.4    NSTEMI, initial episode of care ([facility] I21.4       Isolation/Infection:   Isolation            No Isolation          Patient Infection Status       Infection Onset Added Last Indicated Last Indicated By Review Planned Expiration Resolved Resolved By    None active    Resolved    COVID-19 (Rule Out) 03/14/22 03/14/22 03/14/22 COVID-19, Rapid (Ordered)   03/14/22 Rule-Out Test Resulted            Nurse Assessment:  Last Vital Signs: /67   Pulse 96   Temp 98.3 °F (36.8 °C) (Oral)   Resp 22   Ht 5' 10\" (1.778 m)   Wt 179 lb 3.7 oz (81.3 kg)   SpO2 94%   BMI 25.72 kg/m²     Last documented pain score (0-10 scale): Pain Level: 0  Last Weight:   Wt Readings from Last 1 Encounters:   03/17/22 179 lb 3.7 oz (81.3 kg)     Mental Status:  oriented, alert, coherent, logical, thought processes intact, and able to concentrate and follow conversation    IV Access:  - None    Nursing Mobility/ADLs  Transfer  Dependent  Bathing  Dependent  Dressing  Assisted  Toileting  Assisted  Feeding  Independent  Med [address]  Independent  Med Delivery   whole    Wound Care Documentation and Therapy:  Wound 03/14/22 Buttocks Inner;Right;Upper (Active)   Dressing/Treatment Open to air 03/16/22 2135   Wound Length (cm) 1 cm 03/14/22 1557   Wound Width (cm) 0.5 cm 03/14/22 1557   Wound Depth (cm) 0 cm 03/14/22 1557   Wound Surface Area (cm^2) 0.5 cm^2 03/14/22 1557   Wound Volume (cm^3) 0 cm^3 03/14/22 1557   Wound Assessment Ruptured blister 03/16/22 2135   Drainage Amount Scant 03/16/22 2135   Drainage Description Serous 03/16/22 2135   Odor None 03/16/22 2135   Zoila-wound Assessment Blanchable erythema 03/16/22 2135   Number of days: 3 Elimination:  Continence: Bowel: Yes  Bladder: No  Urinary Catheter: None   Colostomy/Ileostomy/Ileal Conduit: No       Date of Last Bm:3/15/22      Intake/Output Summary (Last 24 hours) at 3/17/2022 1550  Last data filed at 3/17/2022 0824  Gross per 24 hour   Intake 250 ml   Output 650 ml   Net -400 ml     I/O last 3 completed shifts:  In: -   Out: 1450 [Urine:1450]    Safety Concerns: At Risk for Falls    Impairments/Disabilities:      Vision and Hearing    Nutrition Therapy:  Current Nutrition Therapy:   - Oral Diet:  *** General diet/60Grams Carb per meal    Routes of Feeding: Oral  Liquids: Thin Liquids  Daily Fluid Restriction: no  Last Modified Barium Swallow with Video (Video Swallowing Test): not done    Treatments at the Time of Hospital Discharge:   Respiratory Treatments: ***  Oxygen Therapy:  is not on home oxygen therapy.   Ventilator:    - No ventilator support    Rehab Therapies: Physical Therapy and Occupational Therapy  Weight Bearing Status/Restrictions: No weight bearing restrictions  Other Medical Equipment (for information only, NOT a DME order):  wheelchair, walker, bedside commode, and hospital bed  Other Treatments: ***    Patient's personal belongings (please select all that are sent with patient):  Glasses, Dentures upper and lower    RN SIGNATURE:  Electronically signed by Mk Vera on 3/17/22 at 4:52 PM EDT    CASE MANAGEMENT/SOCIAL WORK SECTION    Inpatient Status Date: ***    Readmission Risk Assessment Score:  Readmission Risk              Risk of Unplanned Readmission:  26           Discharging to Facility/ Agency   Name: [address] [facility]  Address:[address] [place] [address]    Dialysis Facility (if applicable)   Name:  Address:  Dialysis Schedule:  Phone:  Fax:    / signature: Electronically signed by WHITNEY Ramirez on 3/17/22 at 3:51 PM EDT    PHYSICIAN SECTION    Prognosis: Fair    Condition at Discharge: Stable    Rehab Potential (if transferring to Rehab): Fair    Recommended Labs or Other Treatments After Discharge: None    Physician Certification: I certify the above information and transfer of Jared Martins  is necessary for the continuing treatment of the diagnosis listed and that he requires [facility] for less 30 days.      Update Admission H&P: No change in H&P    PHYSICIAN SIGNATURE:  Electronically signed by Marjorie Mckeon MD on 3/17/22 at 4:02 PM EDT C/w anakinra  Completed 5 days of Plaquenil  C/w Solumedrol C/w anakinra  Completed 5 days of Plaquenil   s/p  Solumedrol x 5 days, will c/w for one more day as pt still on NRB mask, will try to titrate down oxygen as tolerated

## 2022-03-17 NOTE — PROGRESS NOTES
Transport at bedside for D/C SNF. In good condition, tolerated TX well. All questions/concerns addressed. Wife at beside for transport to SNF.

## 2022-03-17 NOTE — CARE COORDINATION
Social work / Discharge planning:       Social work met with patient and wife. They are interested in [facility] if possible. Options are limited due to active chemo. Referral made to [facility] and they cannot accept. Referral made to [address] [place] [address]. Awaiting response.     Electronically signed by WHITNEY Hayden on 3/17/2022 at 11:42 AM

## 2022-03-18 NOTE — PROGRESS NOTES
Physician Progress Note      Marielena Mitchell  CSN #:                  [id number]  :                       1940  ADMIT DATE:       3/14/2022 9:50 AM  DISCH DATE:        3/17/2022 6:49 PM  RESPONDING  PROVIDER #:        Ricardo Lucia TEXT:    Dr. Yoly Duran,    Patient admitted with right lower extremity cellulitis and noted to have DM. If possible, please document in progress notes and discharge summary the   relationship, if any, between cellulitis and DM. The medical record reflects the following:  Risk Factors: Hx of DM, stage IV lung cancer, BLE edema  Clinical Indicators: per the H&P \"Diabetes mellitus without   complication. ..will treat for cellulitis\"  Treatment: IV Ancef, IV Vanc, SC insulin    Thank you,  Emily Ricardo RN  Clinical Documentation Improvement  [phone]  Options provided:  -- RLE cellulitis associated with Diabetes  -- RLE cellulitis unrelated to Diabetes  -- Other - I will add my own diagnosis  -- Disagree - Not applicable / Not valid  -- Disagree - Clinically unable to determine / Unknown  -- Refer to Clinical Documentation Reviewer    PROVIDER RESPONSE TEXT:    RLE cellulitis associated with Diabetes.     Query created by: Shan Cordero on 3/15/2022 2:03 PM      Electronically signed by:  Kristi Amaral 3/18/2022 11:27 AM

## 2022-03-21 NOTE — TELEPHONE ENCOUNTER
Patient was in hospital for 4 days for lower extremity edema. They are suggesting outpatient to rehab for his legs for weakness in legs and feet.  They have previously used [facility] that would come to the house and would like to use them again if possible

## 2022-03-25 NOTE — TELEPHONE ENCOUNTER
Referral Notes  Number of Notes: 2    . Type Date User Summary Attachment   Referral Entry Note 03/21/2022  3:09 PM [facility] - -   Note    3/21/22 faxed referral,demographic,insurance to [facility] [place] [place] [phone].  They will call patient to schedule

## 2022-04-01 NOTE — TELEPHONE ENCOUNTER
The pt and his wife were at [facility] on Wednesday and Dr Briana Medina thinks the pt needs more Prozac and he thinks the Ativan is making him very tired.  His wife is calling to see if you could increase the Prozac and lower the Ativan

## 2022-04-06 NOTE — TELEPHONE ENCOUNTER
The nurse from [address] [place] [address] was with the pt yesterday and she noticed that he red excoriation on his right groin area that she thinks is a yeast infection.  She is asking if a nystatin powder can be sent over to pharmacy so the wife can put it on the area

## 2022-04-10 PROBLEM — J96.01 ACUTE RESPIRATORY FAILURE WITH HYPOXIA (HCC): Status: ACTIVE | Noted: 2022-01-01

## 2022-04-10 PROBLEM — J18.9 PNEUMONIA: Status: ACTIVE | Noted: 2022-01-01

## 2022-04-10 NOTE — CONSULTS
Palliative Care Department  [phone]  Palliative Care Initial Consult  Provider Blas Andreamikayla Addison Pool [facility] 112  [phone]  Hospital Day: 2  Date of Initial Consult: 4/10/2022  Referring Provider: Edmundo Lyons CNP  Palliative Medicine was consulted for assistance with: [address], famiily support, symptom management  Chief Complaint Today:  Cough, dry mouth    Brief Summary of Hospital Course:   Amber Carranza is a 79 y.o. with a medical history of stage 4 lung cancer who was admitted on 4/9/2022 from home with a CHIEF COMPLAINT of SOB. ASSESSMENT/PLAN:     Recommendations:     Cough :   -  Prn tessalon perles      Pertinent Hospital Problems      Stage 4 lung cancer with immunocompromise from chemotherapy now with right sided pneumonia      Palliative Care Encounter / Counseling Regarding Goals of Care  Please see detailed goals of care discussion as below   At this time, Amber Carranza, Does have capacity for medical decision-making. Capacity is time limited and situation/question specific   During encounter did not need surrogate medical decision-maker   Outcome of goals of care meeting: considering hospice, wants to find out if he could improve before making that decision   Code status Limited no to all but meds   Advanced Directives: no POA or living will in [facility]   Surrogate/Legal NOK:  o Wife Jen Orlando     Spiritual assessment: no spiritual distress identified  Bereavement and grief: to be determined  Referrals to: none today    SUBJECTIVE:     Details of Conversation:  Met with patient and sister at bedside. Patient complaining of cough and dry mouth. Asking about CT chest results. Discussed that mass is same size but now has right sided pneumonia vs drug reaction. Patient reports he feels awful, no taste therefore no appetite.   He and his wife have discussed hospice, he feels close to making that decision to transition but does want to find out more from oncologist about whether he might improve to do more chemo before deciding on hospice. History Of Present Illness:    Per H&P  \"Mr Cabrera presented to the ED with stage IV lung cancer followed with Dr. Palacio at [facility] complaints of dyspnea, loss of appetite with 9 lbs weight loss and generalized weakness that started about 2 weeks ago. Patient is wheelchair-bound and has been near syncopal when attempting to stand. He has had little to eat or drink in the past two weeks. Home pulse ox showed low 70s and is currently on 4L. He is on active chemo and radiation but has not received treatment due to the above symptoms for the past 3 weeks. On room air in the ED he was 82%, on the 4L he is 94-95%. Initially he was hypotensive 70/45 but improved to 124/61 with 2L crystalloid bolus. CTA chest was done and showed new right lung ground glass densities. He was treated in the ED with vanc, zithro, cefepime and will be admitted for pneumonia. \"    Past Medical History:          Diagnosis Date    Diabetes mellitus without complication ([address]     Epiretinal membrane, left eye 10/2019    Full dentures     History of depression     Hyperlipidemia     Hypertension     Hypothyroidism     Peripheral neuropathy     feet, affects balance at times    Prostate cancer [facility])     treated with radiation    Use of cane as ambulatory aid        Past Surgical History:          Procedure Laterality Date    APPENDECTOMY      BRONCHOSCOPY N/A 10/24/2019    BRONCHOSCOPY WITH ANESTHESIA (CYTOLOGY NOTIFIED) performed by Jesse Jefferson MD at [address] [place] [address] Bilateral     CIRCUMCISION      COLONOSCOPY      EYE SURGERY      eyelids    TOOTH EXTRACTION      full mouth    VITRECTOMY Left 2/14/2019    PARS PLANA VITRECTOMY 25 GAUGE MACULAR PUCKER REPAIR AIR FLUID EXCHANGE LEFT EYE performed by Negrita Rojas MD at [facility] VITRECTOMY Left 10/10/2019    PARS PLANA VITRECTOMY 25 GAUGE INTERNAL LIMITING MEMBRANE PEEL OZURDEX INJECTION  LEFT YE E performed by Ursula Dorman MD at [facility] OR       Medications Prior to Admission:      Prior to Admission medications    Medication Sig Start Date End Date Taking? Authorizing Provider   nystatin (MYCOSTATIN) 056228 UNIT/GM powder Apply topically 2 times daily Apply 3 times daily.  4/6/22   Ty Rausch DO   FLUoxetine (PROZAC) 20 MG capsule Take 1 capsule by mouth daily 4/4/22   Ty Rausch DO   sennosides-docusate sodium (SENOKOT-S) 8.6-50 MG tablet Take 2 tablets by mouth nightly 3/17/22 4/16/22  KANDY Dinh   magnesium hydroxide (MILK OF MAGNESIA) 400 MG/5ML suspension Take 30 mLs by mouth daily 3/17/22 4/16/22  KANDY Dinh   insulin glargine (LANTUS) 100 UNIT/ML injection vial Inject 30 Units into the skin nightly 3/17/22   [facility]   insulin lispro (HUMALOG) 100 UNIT/ML injection vial Inject 0-12 Units into the skin 3 times daily (with meals) 3/17/22   [facility] HERBER Ann   furosemide (LASIX) 40 MG tablet Take 0.5 tablets by mouth daily 3/17/22   [facility]   pantoprazole (PROTONIX) 40 MG tablet Take 40 mg by mouth daily    Historical Provider, MD   ferrous sulfate (IRON 325) 325 (65 Fe) MG tablet Take 325 mg by mouth daily    Historical Provider, MD   folic acid (FOLVITE) 1 MG tablet Take 1 mg by mouth daily    Historical Provider, MD   diphenhydrAMINE-APAP, sleep, (TYLENOL PM EXTRA STRENGTH)  MG tablet Take 1 tablet by mouth nightly as needed for Sleep    Historical Provider, MD   Coenzyme Q10 (CO Q 10 PO) Take 200 mg by mouth daily     Historical Provider, MD   P[facility] 112 TEST 4 TIMES DAILY 9/10/19   Historical Provider, MD   CONTOUR NEXT TEST strip TEST FOUR TIMES DAILY 9/10/19   Historical Provider, MD   atorvastatin (LIPITOR) 80 MG tablet Take 80 mg by mouth daily     Historical Provider, MD   levothyroxine (SYNTHROID) 25 MCG tablet Take 50 mcg by mouth Daily  9/15/16   Historical Provider, MD   aspirin 81 MG tablet Take 81 mg by mouth daily     Historical Provider, MD       Allergies:  Patient has no known allergies. Social History:      The patient currently lives at home    TOBACCO:   reports that he quit smoking about 37 years ago. His smoking use included cigarettes. He has a 37.50 pack-year smoking history. He has never used smokeless tobacco.  ETOH:   reports current alcohol use. Family History:       Reviewed in detail and positive as follows:        Problem Relation Age of Onset    Diabetes type 2  Mother     Heart Failure Mother     Heart Attack Father        REVIEW OF SYSTEMS:   Pertinent positives as noted in the HPI. All other systems reviewed and negative.     OBJECTIVE:   Prognosis: Poor and Guarded    Physical Exam:  BP (!) 101/53   Pulse 74   Temp 97.7 °F (36.5 °C) (Oral)   Resp 20   Ht 5' 10\" (1.778 m)   Wt 165 lb (74.8 kg)   SpO2 98%   BMI 23.68 kg/m²     Constitutional:  Elderly, awake, alert, appears uncomfortable, quiet/hoarse voice  Eyes: no scleral icterus, normal lids, no discharge  ENMT:  Normocephalic, atraumatic, mucosa dry, EOMI  Neck:  trachea midline, no JVD  Lungs:  audible rhonchi ,no wheezes noted, respirations unlabored but tachypneic, no retractions  Heart[de-identified]  RRR, distant heart tones, no murmur, rub, or gallop noted during exam  Abd:  Soft, non tender, non distended, bowel sounds present  :  deferred  MSK: sarcopenia present  Ext:  Moving all extremities, no edema, pulses present  Skin:  Warm and dry, no rashes on visible skin, pale  Psych: non-anxious affect  Neuro:  PERRL, Alert, grossly nonfocal; following commands    Objective data reviewed: labs, images, records, medication use, vitals and chart    Labs:     Recent Labs     04/09/22 2144   WBC 15.4*   HGB 9.5*   HCT 30.6*   *     Recent Labs     04/09/22 2144      K 3.7   CL 89*   CO2 39*   BUN 30*   CREATININE 1.0   CALCIUM 8.8     Recent Labs     04/09/22 2144   AST 47*   ALT 21   BILITOT 0.3   ALKPHOS 69     No results for input(s): INR in the last 72 hours. No results for input(s): Jacquelyn Belts in the last 72 hours. Urinalysis:      Lab Results   Component Value Date    NITRU Negative 07/01/2017    BLOODU Negative 07/01/2017    SPECGRAV 1.015 07/01/2017    GLUCOSEU Negative 07/01/2017         Discussed patient and the plan of care with the other IDT members: Palliative Medicine IDT Team, Floor Nurse, Patient and Family    Time/Communication  Greater than 50% of time spent, total 30 minutes in counseling and coordination of care at the bedside regarding goals of care, symptom management, diagnosis and prognosis and see above. Thank you for allowing Palliative Medicine to participate in the care of Jose Corbin.

## 2022-04-10 NOTE — H&P
[facility] History and Physical    CHIEF COMPLAINT:    Shortness of breath    History of Present Illness:    Mr Trudy Khan presented to the ED with stage IV lung cancer followed with Dr. Juan Pablo Sweet at [facility] with complaints of dyspnea, loss of appetite with 9 lbs weight loss and generalized weakness that started about 2 weeks ago. Patient is wheelchair-bound and has been near syncopal when attempting to stand. He has had little to eat or drink in the past two weeks. Home pulse ox showed low 70s and is currently on 4L. He is on active chemo and radiation but has not received treatment due to the above symptoms for the past 3 weeks. On room air in the ED he was 82%, on the 4L he is 94-95%. Initially he was hypotensive 70/45 but improved to 124/61 with 2L crystalloid bolus. CTA chest was done and showed new right lung ground glass densities. He was treated in the ED with vanc, zithro, cefepime and will be admitted for pneumonia. Informant(s) for H&P: Patient    REVIEW OF SYSTEMS:  Review of Systems   Constitutional: Positive for activity change, appetite change and unexpected weight change. 9lbs weight loss in last 2 weeks   HENT: Negative. Eyes: Positive for visual disturbance. Respiratory: Positive for cough and shortness of breath. Productive cough white sputum   Cardiovascular: Negative. Gastrointestinal: Negative. Endocrine: Negative. Genitourinary: Negative. Musculoskeletal: Negative. Skin: Negative. Allergic/Immunologic: Negative. Neurological: Negative. Hematological: Negative. Psychiatric/Behavioral: Negative.         PMH:  Past Medical History:   Diagnosis Date    Diabetes mellitus without complication ([address]     Epiretinal membrane, left eye 10/2019    Full dentures     History of depression     Hyperlipidemia     Hypertension     Hypothyroidism     Peripheral neuropathy     feet, affects balance at times    Prostate cancer [facility])     treated with radiation    Use of cane as ambulatory aid        Surgical History:  Past Surgical History:   Procedure Laterality Date    APPENDECTOMY      BRONCHOSCOPY N/A 10/24/2019    BRONCHOSCOPY WITH ANESTHESIA (CYTOLOGY NOTIFIED) performed by Deepa Holloway MD at [address] [place] [address] Bilateral     CIRCUMCISION      COLONOSCOPY      EYE SURGERY      eyelids    TOOTH EXTRACTION      full mouth    VITRECTOMY Left 2/14/2019    PARS PLANA VITRECTOMY 25 GAUGE MACULAR PUCKER REPAIR AIR FLUID EXCHANGE LEFT EYE performed by Daniella Connor MD at [place] [address] VITRECTOMY Left 10/10/2019    PARS PLANA VITRECTOMY 25 GAUGE INTERNAL LIMITING MEMBRANE PEEL OZURDEX INJECTION  LEFT YE E performed by Daniella Connor MD at [facility] OR       Medications Prior to Admission:    Prior to Admission medications    Medication Sig Start Date End Date Taking? Authorizing Provider   nystatin (MYCOSTATIN) 305168 UNIT/GM powder Apply topically 2 times daily Apply 3 times daily.  4/6/22   Metro Cea, DO   FLUoxetine (PROZAC) 20 MG capsule Take 1 capsule by mouth daily 4/4/22   Metro Cea, DO   sennosides-docusate sodium (SENOKOT-S) 8.6-50 MG tablet Take 2 tablets by mouth nightly 3/17/22 4/16/22  KANDY Avalos   magnesium hydroxide (MILK OF MAGNESIA) 400 MG/5ML suspension Take 30 mLs by mouth daily 3/17/22 4/16/22  KANDY Avalos   insulin glargine (LANTUS) 100 UNIT/ML injection vial Inject 30 Units into the skin nightly 3/17/22   HERBER Hoang   insulin lispro (HUMALOG) 100 UNIT/ML injection vial Inject 0-12 Units into the skin 3 times daily (with meals) 3/17/22   HERBER Hoang   furosemide (LASIX) 40 MG tablet Take 0.5 tablets by mouth daily 3/17/22   HERBER Hoang   pantoprazole (PROTONIX) 40 MG tablet Take 40 mg by mouth daily    Historical Provider, MD   ferrous sulfate (IRON 325) 325 (65 Fe) MG tablet Take 325 mg by mouth daily    Historical Provider, MD folic acid (FOLVITE) 1 MG tablet Take 1 mg by mouth daily    Historical Provider, MD   diphenhydrAMINE-APAP, sleep, (TYLENOL PM EXTRA STRENGTH)  MG tablet Take 1 tablet by mouth nightly as needed for Sleep    Historical Provider, MD   Coenzyme Q10 (CO Q 10 PO) Take 200 mg by mouth daily     Historical Provider, MD   [address] TEST 4 TIMES DAILY 9/10/19   Historical Provider, MD   CONTOUR NEXT TEST strip TEST FOUR TIMES DAILY 9/10/19   Historical Provider, MD   atorvastatin (LIPITOR) 80 MG tablet Take 80 mg by mouth daily     Historical Provider, MD   ammonium lactate (AMLACTIN) 12 % cream Apply topically as needed (feet)  8/29/16   Historical Provider, MD   levothyroxine (SYNTHROID) 25 MCG tablet Take 50 mcg by mouth Daily  9/15/16   Historical Provider, MD   aspirin 81 MG tablet Take 81 mg by mouth daily     Historical Provider, MD       Allergies:    Patient has no known allergies. Social History:    reports that he quit smoking about 37 years ago. His smoking use included cigarettes. He has a 37.50 pack-year smoking history. He has never used smokeless tobacco. He reports current alcohol use. He reports that he does not use drugs. Family History:   family history includes Diabetes type 2  in his mother; Heart Attack in his father; Heart Failure in his mother.        PHYSICAL EXAM:  Vitals:  /61   Pulse 84   Temp 98.6 °F (37 °C)   Resp 16   Ht 5' 10\" (1.778 m)   Wt 165 lb (74.8 kg)   SpO2 95%   BMI 23.68 kg/m²     General Appearance: alert and oriented to person, place and time and in no acute distress, weak gravelly voice  Skin: warm and dry  Head: normocephalic and atraumatic  Eyes: pupils equal, round, and reactive to light, extraocular eye movements intact, conjunctivae normal  Neck: neck supple and non tender without mass   Pulmonary/Chest:on 4L NC, rhonchi throughout the right lung, diminished left lung, loose rattling cough, no respiratory distress, 99% spo2  Cardiovascular: normal rate, normal S1 and S2 and no carotid bruits  Abdomen: soft, non-tender, non-distended, normal bowel sounds, no masses or organomegaly  Extremities: no cyanosis, no clubbing and no edema  Neurologic: no cranial nerve deficit and speech normal    LABS:  Recent Labs     04/09/22 2144 04/09/22 2213     --    K 3.7  --    CL 89*  --    CO2 39*  --    BUN 30*  --    CREATININE 1.0  --    GLUCOSE 128* 120   CALCIUM 8.8  --        Recent Labs     04/09/22 2144   WBC 15.4*   RBC 3.32*   HGB 9.5*   HCT 30.6*   MCV 92.2   MCH 28.6   MCHC 31.0*   RDW 20.0*   *   MPV 10.0       Radiology:   CTA PULMONARY W CONTRAST   Final Result   No evidence of pulmonary embolism. New patchy ground-glass densities in the right lung most suggestive of viral   pneumonia versus drug reaction. No significant change in the left suprahilar large mass with invasion into   the mediastinum and complete obstruction of the left bronchus. There is   stable complete collapse of the left lower lobe with small left pleural   effusion and left hemidiaphragm elevation. No significant change in left adrenal metastasis. XR CHEST PORTABLE   Final Result   No significant change compared to the prior exam on 03/14/2022 demonstrating   the left lung mass with left lower lobe collapse and pleural effusion. EKG: Sinus tachycardia  Otherwise normal ECG  No previous ECGs available    ASSESSMENT:      Active Problems:   Hypoxic respiratory failure   Pneumonia  Resolved Problems:    * No resolved hospital problems. *      PLAN:    1. Acute hypoxic respiratory failure 2/2 pneumonia  - CTA chest with right lung ground glass densities  - Started on vanc, cefepime, zithro in ED - Consult ID to guide abx  - Pending: blood culture, procal, RVP  - Send sputum, urine ags  - Pulmonary hygiene, scheduled nebs    2.  Troponin leak, recent NSTEMI  - Suspect type II with hypoxia and hypotension  - Troponin 366 up from 261 in March  - 324 mg asa, therapeutic lovenox  - Check a1c, lipids, trend trop  - Consult cardiology    3. Type 2 diabetes  - Start LDISS, hold home lantus with poor po    4. Hx essential htn  - Hold home meds with hypotension on presentation    5. Hx hlp  - Check lipids  - Start home statin    6. Hx hypothyroid  - Check tsh  - Start home synthroid    7. Stage IV lungs cancer  - Followed at [facility] has been discussed but not introduced  - Consult Palliative Care    Code Status: No CPR, No intubation, No defib  DVT prophylaxis: Lovenox  GI prophylaxis: pantoprazole  Bowel regimen: senna, prn glycolax  Nutrition: Cardiac diet, protein supplement  Activity: Up as tolerated, PTOT  NOTE: This report was transcribed using voice recognition software. Every effort was made to ensure accuracy; however, inadvertent computerized transcription errors may be present.   Electronically signed by ISHMAEL Lee CNP on 4/10/2022 at 12:02 AM

## 2022-04-10 NOTE — CONSULTS
[address] [place] [address] [facility]  Consultation Note     Admit Date: 4/9/2022  8:26 PM    Reason for Consult:   [facility] on call. Pneumonia. Lung cancer on chemo    Attending Physician:  Yvette iMchelle DO    HISTORY OF PRESENT ILLNESS:             The history is obtained from extensive review of available past medical records. The patient is a 79 y.o. male who is not known to the ID service. The patient was recently admitted to [facility] on 3/14/2022 with a non-STEMI. He was discharged on 3/70/22. He comes back to the ED at [facility] on 4/9/2022 with shortness of breath, hypoxia, weakness. He is currently being treated with Gemzar for lung cancer and has undergone chemotherapy and radiation therapy. Upon presentation he was afebrile but hypotensive. His white count was 15.4. Hemoglobin was 9.5. Creatinine and transaminases were normal.  Lactic acid was normal.  CT angiogram showed groundglass opacities, suggestive of viral pneumonia. A respiratory panel, however, was negative. The patient is already feeling better. He has a poor appetite and has not been eating or drinking well since he started Gemzar according to his wife. There is no history of fevers. He has a cough but it is minimally productive. He received a dose of Azithromycin, Vancomycin and Cefepime.     Past Medical History:        Diagnosis Date    Diabetes mellitus without complication ([address]     Epiretinal membrane, left eye 10/2019    Full dentures     History of depression     Hyperlipidemia     Hypertension     Hypothyroidism     Peripheral neuropathy     feet, affects balance at times    Prostate cancer [facility])     treated with radiation    Use of cane as ambulatory aid      Past Surgical History:        Procedure Laterality Date    APPENDECTOMY      BRONCHOSCOPY N/A 10/24/2019    BRONCHOSCOPY WITH ANESTHESIA (CYTOLOGY NOTIFIED) performed by Marjorie Robertson MD at [place] [address] REMOVAL WITH IMPLANT Bilateral     CIRCUMCISION      COLONOSCOPY      EYE SURGERY      eyelids    TOOTH EXTRACTION      full mouth    VITRECTOMY Left 2/14/2019    PARS PLANA VITRECTOMY 25 GAUGE MACULAR PUCKER REPAIR AIR FLUID EXCHANGE LEFT EYE performed by Velia Baca MD at [facility] 22 VITRECTOMY Left 10/10/2019    PARS PLANA VITRECTOMY 25 GAUGE INTERNAL LIMITING MEMBRANE PEEL OZURDEX INJECTION  LEFT YE E performed by Velia Baca MD at [facility] OR     Current Medications:   Scheduled Meds:   sodium chloride flush  5-40 mL IntraVENous 2 times per day    sennosides-docusate sodium  1 tablet Oral BID    ipratropium-albuterol  1 ampule Inhalation Q4H WA    enoxaparin  1 mg/kg SubCUTAneous BID    pantoprazole  40 mg Oral QAM AC    insulin lispro  0-6 Units SubCUTAneous TID WC    insulin lispro  0-3 Units SubCUTAneous Nightly    levothyroxine  50 mcg Oral Daily    atorvastatin  80 mg Oral Daily    ferrous sulfate  325 mg Oral Daily    FLUoxetine  20 mg Oral Daily    folic acid  1 mg Oral Daily    magnesium hydroxide  30 mL Oral Daily    aspirin  81 mg Oral Daily    sodium chloride  1,000 mL IntraVENous Once     Continuous Infusions:   sodium chloride      sodium chloride 100 mL/hr at 04/10/22 0240    dextrose       PRN Meds:sodium chloride flush, sodium chloride, ondansetron **OR** ondansetron, polyethylene glycol, acetaminophen **OR** acetaminophen, benzonatate, glucose, dextrose, glucagon (rDNA), dextrose    Allergies:  Patient has no known allergies.     Social History:   Social History     Socioeconomic History    Marital status:      Spouse name: Not on file    Number of children: Not on file    Years of education: Not on file    Highest education level: Not on file   Occupational History     Employer: RETIRED   Tobacco Use    Smoking status: Former Smoker     Packs/day: 1.50     Years: 25.00     Pack years: 37.50     Types: Cigarettes     Quit date: 1/1/1985     Years since quittin.2    Smokeless tobacco: Never Used   Vaping Use    Vaping Use: Never used   Substance and Sexual Activity    Alcohol use: Yes     Comment: Albert    Drug use: No    Sexual activity: Yes     Partners: Female   Other Topics Concern    Not on file   Social History Narrative    Not on file     Social Determinants of Health     Financial Resource Strain:     Difficulty of Paying Living Expenses: Not on file   Food Insecurity:     Worried About Running Out of Food in the Last Year: Not on file    Kvng of Food in the Last Year: Not on file   Transportation Needs:     Lack of Transportation (Medical): Not on file    Lack of Transportation (Non-Medical): Not on file   Physical Activity: Inactive    Days of Exercise per Week: 0 days    Minutes of Exercise per Session: 0 min   Stress:     Feeling of Stress : Not on file   Social Connections:     Frequency of Communication with Friends and Family: Not on file    Frequency of Social Gatherings with Friends and Family: Not on file    Attends Rastafari Services: Not on file    Active Member of [address] [place] [address] or Organizations: Not on file    Attends Club or Organization Meetings: Not on file    Marital Status: Not on file   Intimate Partner Violence:     Fear of Current or Ex-Partner: Not on file    Emotionally Abused: Not on file    Physically Abused: Not on file    Sexually Abused: Not on file   Housing Stability:     Unable to Pay for Housing in the Last Year: Not on file    Number of [place] in the Last Year: Not on file    Unstable Housing in the Last Year: Not on file      Pets: None  Travel: No  The patient lives at home with his wife. He retired as a     Family History:       Problem Relation Age of Onset    Diabetes type 2  Mother     Heart Failure Mother     Heart Attack Father    . Otherwise non-pertinent to the chief complaint.     REVIEW OF SYSTEMS:    Constitutional: Negative for fevers, chills, diaphoresis  Neurologic: Negative   Psychiatric: Negative  Rheumatologic: Negative   Endocrine: Negative  Hematologic: Negative  Immunologic: Negative  ENT: Negative  Respiratory: As in the HPI  Cardiovascular: Negative  GI: Negative  : Negative  Musculoskeletal: Negative  Skin: No rashes. PHYSICAL EXAM:    Vitals:   BP (!) 117/56   Pulse 93   Temp 97.5 °F (36.4 °C) (Axillary)   Resp 18   Ht 5' 10\" (1.778 m)   Wt 165 lb (74.8 kg)   SpO2 99%   BMI 23.68 kg/m²   Constitutional: The patient is awake, alert, and oriented. Lying in bed. He appears to be fatigued. Wife present. Skin: Warm and dry. No rashes were noted. HEENT: Eyes show round, and reactive pupils. No jaundice. Moist mucous membranes, no ulcerations, no thrush. Neck: Supple to movements. No lymphadenopathy. Chest: No use of accessory muscles to breathe. Symmetrical expansion. Auscultation reveals no wheezing, crackles, or rhonchi. Left Mediport not accessed. Cardiovascular: S1 and S2 are rhythmic and regular. No murmurs appreciated. Abdomen: Positive bowel sounds to auscultation. Benign to palpation. No masses felt. No hepatosplenomegaly. Extremities: No clubbing, no cyanosis, no edema. Couple scabs noted on the right leg.   Lines: Peripheral.      CBC+dif:  Recent Labs     04/09/22  2144   WBC 15.4*   HGB 9.5*   HCT 30.6*   MCV 92.2   *   NEUTROABS 13.31*     No results found for: CRP   No results found for: CRPHS  No results found for: SEDRATE  Lab Results   Component Value Date    ALT 21 04/10/2022    AST 44 (H) 04/10/2022    ALKPHOS 65 04/10/2022    BILITOT 0.3 04/10/2022     Lab Results   Component Value Date     04/10/2022    K 3.4 04/10/2022    CL 92 04/10/2022    CO2 37 04/10/2022    BUN 23 04/10/2022    CREATININE 0.8 04/10/2022    GFRAA >60 04/10/2022    LABGLOM >60 04/10/2022    GLUCOSE 173 04/10/2022    PROT 5.7 04/10/2022    LABALBU 2.4 04/10/2022    CALCIUM 8.1 04/10/2022    BILITOT 0.3 04/10/2022 ALKPHOS 65 04/10/2022    AST 44 04/10/2022    ALT 21 04/10/2022       Lab Results   Component Value Date    PROTIME 15.3 03/14/2022    INR 1.4 03/14/2022       Lab Results   Component Value Date    TSH 1.75 08/22/2018       Lab Results   Component Value Date    COLORU Yellow 07/01/2017    PHUR 7.5 07/01/2017    CLARITYU Clear 07/01/2017    SPECGRAV 1.015 07/01/2017    LEUKOCYTESUR Negative 07/01/2017    UROBILINOGEN 0.2 07/01/2017    BILIRUBINUR Negative 07/01/2017    BLOODU Negative 07/01/2017    GLUCOSEU Negative 07/01/2017       Lab Results   Component Value Date    HCO3 35.3 04/09/2022    BE 10.3 04/09/2022    O2SAT 95.0 04/09/2022    PH 7.468 04/09/2022    PCO2 49.8 04/09/2022    PO2 80.3 04/09/2022     Radiology:      Microbiology:  Pending  No results for input(s): BC in the last 72 hours. No results for input(s): ORG in the last 72 hours. No results for input(s): Winda Holbrook in the last 72 hours. No results for input(s): STREPNEUMAGU in the last 72 hours. No results for input(s): LP1UAG in the last 72 hours. No results for input(s): ASO in the last 72 hours. No results for input(s): CULTRESP in the last 72 hours. Recent Labs     04/09/22  2344   PROCAL 0.19*       Assessment:  · Lung cancer, undergoing chemotherapy with Gemzar  · Poor appetite and dehydration associated to chemotherapy  · Possible viral pneumonia. No evidence of lung consolidation consistent with bacterial pneumonia  · Leukocytosis    Plan:    · No further antibiotics are warranted  · Continue supportive treatment  · Check cultures, baseline ESR, CRP  · Will follow with you    Thank you for having us see this patient in consultation. I will be discussing this case with the treating physicians. Spoke with wife.     Lora Smith MD  12:34 PM  4/10/2022

## 2022-04-10 NOTE — PROGRESS NOTES
Dr. Miranda Simmons notified via [employer] of new consult for pneumonia, lung cancer on chemo.

## 2022-04-10 NOTE — PLAN OF CARE
Assessment:   1. Acute hypoxic failure secondary to PNA  2. Troponin, recent NSTEMI  3. DM2  4. Htn  5. Hyperlipidemia   6. Hypothyroidism   7. Stage IV lungs cancer   8. Hypokalemia     Plan:   1. Acute hypoxic failure secondary to PNA: CTA showing rigth lung ground glass opacities. Started on vanc, cefepime and azithromycin in ED. ID consulted for further recommendations. Procal 0. 19. Respiratory viral panel negative. Blood cultures pending. Urine ags pending. Pulmonary hygiene. Scheduled nebs. 2. Troponin, recent NSTEMI: Trending down. Likely type II with hypoxia and hypotension. Cardiac diet. Continue ASA and Statin. Lovenox 1 mg /kg BID for 48 horus. Cardiology consulted. 3. DM2L Low dose ssi. Hold home lantus due to poor intake. Hypoglycemic protocol. DM diet. 4. Htn: Holding home meds due to hypotension. Monitor. 5. Hyperlipidemia: Continue Lipitor. Lipid panel pending. 6. Hypothyroidism: Continue Synthroid. TSH pending. 7. Stage IV lungs cancer: Follows with the [facility] outpatient. Palliative care consulted. Patient has thought about hospice but would like to discuss options with his oncologist before deciding. 8. Hypokalemia: Supplement.  Monitor.

## 2022-04-10 NOTE — CONSULTS
Inpatient Cardiology Consultation      Reason for Consult:  Hx NSTEMI, Troponin leak 366    Consulting Physician: Dr. Gary Cobian    Requesting Physician:  ISHMAEL Vincent     Date of Consultation: 4/10/2022    HISTORY OF PRESENT ILLNESS:   Mr. Edgar Meehan is an 79year old  male who was most recently seen in initial consultation with Dr. Flakito Bravo on 03/14/2022. His medical history includes HTN, HLD, DM, peripheral neuropathy, remote tobacco abuse, depression, hypothyroidism, prostate CA s/p radiation, stage IV lung CA s/p radiation and chemotherapy. Mr. Edgar Meehan presented to [facility] ED on 04/09/2022 with complaints of hypoxia and dyspnea. Please note this information was obtained by the patient's sister, via telephone, as Mr. Edgar Meehan remains in [facility]- isolation, to conserve PPE and limit exposure. According to Mr. Juan Diego Curran sister via telephone, he was noted to be hypoxic and hypotensive with complaints of dyspnea and dizziness and lightheadedness over the course of the past several weeks. She states that he most recently underwent chemotherapy 3 weeks ago and has not been able to have further treatment as \" he has just been too weak, he sits up in a recliner, he does not walk, and needs help being transferred to a wheelchair\". She states over the course of the past several weeks he has been sitting upright in recliner without complaints of orthopnea, PND. She states that his edema to BLE is much improved with sitting up in the recliner since his most recent hospitalization in 03/2022. She states that since 03/2022 he has been having midsternal continuous chest pain that is unchanged with ROM, deep inspiration, or food ingestion. He was not complaining of fever or chills. She states he was complaining of aches to his chest area and also pain as described above. He states that he has had limited oral intake due to excessive weakness. She states that he is not on O2 at home.   She states compliance with his medications. EMS was summoned, according to ED documentation his SaO2 was in the 60s upon EMS arrival.  Upon arrival to the ED his VS were 98 point 6-87-22-84/50-82% on RA. EKG SR with PACs . rapid Covid test negative. WBC 15.4. H&H 9.5/30. 6. .  K3.7.  BUN/SCR 30/1. Troponin of 366. Lipase 15.  proBNP 584. ABGs on 4 L by nasal cannula with pH of 7.468, PCO2 49.8, and PO2 of 80.3. CTA of the chest was negative for pulmonary embolism and did show new groundglass opacity to right lower base with viral pneumonia versus drug reaction likely. Also noted large left suprahilar mass with invasion into mediastinum and complete obstruction of the left bronchus and complete collapse of the left lower lung with small left pleural effusion noted known left adrenal metastasis. He received 2 L NS bolus, Zithromax, cefepime, vancomycin. He was admitted to a telemetry monitored unit. Palliative care and ID were consulted. Cardiology was consulted for management of troponin leak of 366 with Hx NSTEMI. Currently, according to his sister he is not complaining of chest pain or dyspnea. Please note: past medical records were reviewed per electronic medical record (EMR) - see detailed reports under Past Medical/ Surgical History. Past Medical and Surgical History:    1. HTN  2. HLD  3. DM  4. Peripheral neuropathy   5. Remote tobacco abuse   6. Depression   7. Hypothyroidism  8. Prostate CA s/p radiation   9. Stage IV Lung CA (diagnosed 28 months ago per patient sister and has been on several various chemo agents). S/p radiation. Due to to receive Gemzar next week  10. S/p Appendectomy, cataract removal with lens implant, vitrectomy, and full mouth dental extractions. 11. Limited Code Status (No intubation, no defibrillation, no chest compressions, only resuscitative medications)      Medications Prior to admit:  Prior to Admission medications    Medication Sig Start Date End Date Taking?  Authorizing Provider   nystatin (MYCOSTATIN) 409848 UNIT/GM powder Apply topically 2 times daily Apply 3 times daily.  4/6/22   Metro Cea, DO   FLUoxetine (PROZAC) 20 MG capsule Take 1 capsule by mouth daily 4/4/22   Metro Cea, DO   sennosides-docusate sodium (SENOKOT-S) 8.6-50 MG tablet Take 2 tablets by mouth nightly 3/17/22 4/16/22  KANDY Avalos   magnesium hydroxide (MILK OF MAGNESIA) 400 MG/5ML suspension Take 30 mLs by mouth daily 3/17/22 4/16/22  KANDY Avalos   insulin glargine (LANTUS) 100 UNIT/ML injection vial Inject 30 Units into the skin nightly 3/17/22   HERBER Hoang   insulin lispro (HUMALOG) 100 UNIT/ML injection vial Inject 0-12 Units into the skin 3 times daily (with meals) 3/17/22   HERBER Hoang   furosemide (LASIX) 40 MG tablet Take 0.5 tablets by mouth daily 3/17/22   HERBER Hoang   pantoprazole (PROTONIX) 40 MG tablet Take 40 mg by mouth daily    Historical Provider, MD   ferrous sulfate (IRON 325) 325 (65 Fe) MG tablet Take 325 mg by mouth daily    Historical Provider, MD   folic acid (FOLVITE) 1 MG tablet Take 1 mg by mouth daily    Historical Provider, MD   diphenhydrAMINE-APAP, sleep, (TYLENOL PM EXTRA STRENGTH)  MG tablet Take 1 tablet by mouth nightly as needed for Sleep    Historical Provider, MD   Coenzyme Q10 (CO Q 10 PO) Take 200 mg by mouth daily     Historical Provider, MD   MICROLET LANCETS [address] TEST 4 TIMES DAILY 9/10/19   Historical Provider, MD   CONTOUR NEXT TEST strip TEST FOUR TIMES DAILY 9/10/19   Historical Provider, MD   atorvastatin (LIPITOR) 80 MG tablet Take 80 mg by mouth daily     Historical Provider, MD   levothyroxine (SYNTHROID) 25 MCG tablet Take 50 mcg by mouth Daily  9/15/16   Historical Provider, MD   aspirin 81 MG tablet Take 81 mg by mouth daily     Historical Provider, MD       Current Medications:    Current Facility-Administered Medications: sodium chloride flush 0.9 % injection 5-40 mL, 5-40 mL, IntraVENous, 2 spends much of his time sitting upright in a recliner. Family History:   Please note this information was not obtained at this time, as it is limited in nature due to the patient's advanced age. REVIEW OF SYSTEMS:     Please note this information was obtained by the patient's sister who is present at the bedside as he remains in COVID-19 isolation, to conserve PPE and limit exposure. · Constitutional: Denies fevers, chills, night sweats, and fatigue  · HEENT: Denies headaches, nose bleeds, and blurred vision,oral pain, abscess or lesion. · Musculoskeletal: Denies falls, pain to BLE with ambulation and edema to BLE. · Neurological: Complaining of dizziness and lightheadedness-see HPI. Denies numbness and tingling  · Cardiovascular: Denies palpitations, and feelings of heart racing. Complains of chronic chest pain-see HPI  · Respiratory: Denies orthopnea and PND. Complaining of dyspnea-see HPI  · Gastrointestinal: Denies heartburn, nausea/vomiting, diarrhea and constipation, black/bloody, and tarry stools. · Genitourinary: Denies dysuria and hematuria  · Hematologic: Denies excessive bruising or bleeding  · Lymphatic: Denies lumps and bumps to neck, axilla, breast, and groin  · Endocrine: Denies excessive thirst. Denies intolerance to hot and cold  · GYN: Postmenopausal state; Denies vaginal bleeding. · Psychiatric: Denies anxiety and depression. PHYSICAL EXAM:   BP (!) 101/53   Pulse 74   Temp 97.7 °F (36.5 °C) (Oral)   Resp 20   Ht 5' 10\" (1.778 m)   Wt 165 lb (74.8 kg)   SpO2 98%   BMI 23.68 kg/m²   Physical exam not performed as patient remains in COVID-19 isolation, to conserve PPE and limit exposure.     DATA:    ECG: As above  Tele strips: As above  Diagnostic:      Intake/Output Summary (Last 24 hours) at 4/10/2022 0726  Last data filed at 4/10/2022 0054  Gross per 24 hour   Intake 2606.54 ml   Output --   Net 2606.54 ml       Labs:   CBC:   Recent Labs     04/09/22  2144   WBC 15.4*   HGB 9.5*   HCT 30.6*   *     BMP:   Recent Labs     04/09/22 2144 04/10/22  0606    135   K 3.7 3.4*   CO2 39* 37*   BUN 30* 23   CREATININE 1.0 0.8   LABGLOM >60 >60   CALCIUM 8.8 8.1*     Mag:   Recent Labs     04/10/22  0606   MG 2.1   FASTING LIPID PANEL:  Lab Results   Component Value Date    CHOL 120 02/17/2020    HDL 41 02/17/2020    LDLCALC 63 02/17/2020    TRIG 77 02/17/2020     LIVER PROFILE:  Recent Labs     04/09/22  2144 04/10/22  0606   AST 47* 44*   ALT 21 21   LABALBU 2.7* 2.4*     Results for Mariana Dawson (MRN [id number]) as of 4/10/2022 07:30   Ref. Range 3/14/2022 12:25 4/9/2022 21:44 4/10/2022 00:57 4/10/2022 06:06   Troponin, High Sensitivity Latest Ref Range: 0 - 11 ng/L 261 (H) 366 (H) 279 (H) 270 (H)     04/09/2022 CXR:   No significant change compared to the prior exam on 03/14/2022 demonstrating the left lung mass with left lower lobe collapse and pleural effusion. 04/09/2022 CTA Chest:   No evidence of pulmonary embolism. New patchy ground-glass densities in the right lung most suggestive of viral  pneumonia versus drug reaction. No significant change in the left suprahilar large mass with invasion into  the mediastinum and complete obstruction of the left bronchus.  There is  stable complete collapse of the left lower lobe with small left pleural  effusion and left hemidiaphragm elevation. No significant change in left adrenal metastasis. IMPRESSION and PLAN to follow per Dr. David Rodríguez    Electronically signed by ISHMAEL Hodges CNP on 4/10/2022 at 7:26 AM     I independently performed an evaluation on the patient. I have reviewed the above documentation completed by the advance practitioner. Please see my additional contributions to the HPI, physical exam, assessment and medical decision making.     Physical Exam   BP (!) 117/56   Pulse 93   Temp 97.5 °F (36.4 °C) (Axillary)   Resp 20   Ht 5' 10\" (1.778 m)   Wt 165 lb (74.8 kg)   SpO2 100% BMI 23.68 kg/m²   Constitutional: Oriented to person, place, and time. Well-developed and well-nourished. No distress. Head: Normocephalic and atraumatic. Eyes: EOM are normal. Pupils are equal, round, and reactive to light. Neck: Normal range of motion. Neck supple. No hepatojugular reflux and no JVD present. Carotid bruit is not present. No tracheal deviation present. No thyromegaly present. Cardiovascular: Normal rate, regular rhythm, normal heart sounds and intact distal pulses. Exam reveals no gallop and no friction rub. No murmur heard. Pulmonary/Chest: Effort normal and breath sounds normal. No respiratory distress. No wheezes. No rales. No tenderness. Abdominal: Soft. Bowel sounds are normal. No distension and no mass. No tenderness. No rebound and no guarding. Musculoskeletal: Normal range of motion. No edema and no tenderness. Lymphadenopathy:   No cervical adenopathy. Neurological: Alert and oriented to person, place, and time. Skin: Skin is warm and dry. No rash noted. Not diaphoretic. No erythema. Psychiatric: Normal mood and affect. Behavior is normal.     See accompanying documentation for full consult. ASSESSMENT AND PLAN:  1. NSTEMI/Elevated troponin:    Only appropriate for conservative medical management. ASA/statin/lovenox x 48 hours. No BB due to low BP issues. 2. Stage IV Lung CA: Diagnosed 28 months ago per patient sister and has been on several various chemo agents.  S/p radiation.  Due to to receive Gemzar next week    3. HTN: Observe. 4. Lipids: Statin. 5. DM: Per primary service. 6. Peripheral neuropathy     7. Hypothyroidism: On HRT. 8. Anemia: Follow labs. 9. COVID 19 rule out. Javan Baer, D.O.   Cardiologist  Cardiology, [facility]

## 2022-04-10 NOTE — ED PROVIDER NOTES
Department of Emergency Medicine   ED Provider Note  Admit Date/RoomTime: 4/9/2022  8:26 PM  ED Room: 25/25          History of Present Illness:  4/9/22, Time: 8:54 PM EDT         Maikol Oreilly is a 79 y.o. male with history of diabetes, stage IV lung cancer (follows with Dr. Kaitlin Mccoy at [facility]) Presenting to the ED for shortness of breath, hypoxia, generalized weakness, beginning about 2 weeks ago. The complaint has been constant, moderate in severity, and worsened by light exertion. Wife at bedside providing history. She states that patient is wheelchair-bound, becoming increasingly weak in the past 2 weeks. She states that he has very poor appetite, does not eat or drink much. She states that he gets very lightheaded, near syncopal when he stands up (with assistance). He previously underwent radiation therapy, chemotherapy currently is on \"Gensar\" for lung CA, he has not had this for the past 2 weeks due to fatigue. he is not on anticoagulation. He denies any chest pain, endorses shortness of breath that began today. He has a chronic cough, unchanged. He denies any hemoptysis. He is not on home oxygen, though her pulse ox at home was reading in the low 70s spo2 prior to arrival, he is currently on 4 L of O2 via nasal cannula. Patient denies any abdominal pain or nausea or vomiting or diarrhea. He denies fevers, endorses intermittent chills. He denies any dysuria/ urinary symptoms    Review of Systems:     Pertinent positives and negatives are stated within HPI.   10 point ROS otherwise negative.    --------------------------------------------- PAST HISTORY ---------------------------------------------  Past Medical History:  has a past medical history of Diabetes mellitus without complication ([address] Epiretinal membrane, left eye, Full dentures, History of depression, Hyperlipidemia, Hypertension, Hypothyroidism, Peripheral neuropathy, Prostate cancer ([address] and Use of cane as ambulatory aid. Past Surgical History:  has a past surgical history that includes eye surgery; Appendectomy; Circumcision; vitrectomy (Left, 2/14/2019); Cataract removal with implant (Bilateral); Colonoscopy; vitrectomy (Left, 10/10/2019); Tooth Extraction; and bronchoscopy (N/A, 10/24/2019). Social History:  reports that he quit smoking about 37 years ago. His smoking use included cigarettes. He has a 37.50 pack-year smoking history. He has never used smokeless tobacco. He reports current alcohol use. He reports that he does not use drugs. Family History: family history includes Diabetes type 2  in his mother; Heart Attack in his father; Heart Failure in his mother. The patients home medications have been reviewed. Allergies: Patient has no known allergies. ---------------------------------------------------PHYSICAL EXAM--------------------------------------    Constitutional/General: AAO to person/place/time/purpose, acute on chronically ill-appearing, uncomfortable appearing   Head: Normocephalic and atraumatic  Eyes: PERRL, EOMI, conjunctiva normal, sclera non icteric  Neck: Supple, no stridor, no meningeal signs  Respiratory: Mild-moderate respiratory distress, on 4 L of O2 via nasal cannula. Mild tachypnea. Patient with crackles and rhonchi throughout right lung. Absent breath sounds at base of left lung. Diminished breath sounds throughout apex of left lung. Cardiovascular:  Regular rate. Regular rhythm. No murmurs, no gallops, or rubs. 2+ distal pulses. Equal extremity pulses. Chest: No chest wall tenderness or deformity  GI:  Abdomen Soft, Round, Non tender, Non distended. No rebound, guarding, or rigidity. No pulsatile masses. Musculoskeletal: Moves all extremities x 4. Warm and well perfused, no clubbing, cyanosis. Trace pitting edema pretibially and dorsal aspect of bilateral feet capillary refill <3 seconds  Integument: skin warm and dry. No rashes.    Neurologic: GCS 15, no focal deficits, symmetric strength 4/5 in the major muscle groups of upper and lower extremities bilaterally  Psychiatric: Normal Affect      -----------------------------------------PROCEDURES----------------------------------------------------      -------------------------------------------------- RESULTS -------------------------------------------------  I have personally reviewed all laboratory and imaging results for this patient. Results are listed below.      LABS:  Results for orders placed or performed during the hospital encounter of 04/09/22   COVID-19, Rapid    Specimen: Nasopharyngeal Swab   Result Value Ref Range    SARS-CoV-2, NAAT Not Detected Not Detected   CBC with Auto Differential   Result Value Ref Range    WBC 15.4 (H) 4.5 - 11.5 E9/L    RBC 3.32 (L) 3.80 - 5.80 E12/L    Hemoglobin 9.5 (L) 12.5 - 16.5 g/dL    Hematocrit 30.6 (L) 37.0 - 54.0 %    MCV 92.2 80.0 - 99.9 fL    MCH 28.6 26.0 - 35.0 pg    MCHC 31.0 (L) 32.0 - 34.5 %    RDW 20.0 (H) 11.5 - 15.0 fL    Platelets 525 (H) 507 - 450 E9/L    MPV 10.0 7.0 - 12.0 fL    Neutrophils % 86.4 (H) 43.0 - 80.0 %    Immature Granulocytes % 1.3 0.0 - 5.0 %    Lymphocytes % 4.3 (L) 20.0 - 42.0 %    Monocytes % 7.4 2.0 - 12.0 %    Eosinophils % 0.3 0.0 - 6.0 %    Basophils % 0.3 0.0 - 2.0 %    Neutrophils Absolute 13.31 (H) 1.80 - 7.30 E9/L    Immature Granulocytes # 0.20 E9/L    Lymphocytes Absolute 0.67 (L) 1.50 - 4.00 E9/L    Monocytes Absolute 1.14 (H) 0.10 - 0.95 E9/L    Eosinophils Absolute 0.05 0.05 - 0.50 E9/L    Basophils Absolute 0.04 0.00 - 0.20 E9/L   Comprehensive Metabolic Panel w/ Reflex to MG   Result Value Ref Range    Sodium 135 132 - 146 mmol/L    Potassium reflex Magnesium 3.7 3.5 - 5.0 mmol/L    Chloride 89 (L) 98 - 107 mmol/L    CO2 39 (H) 22 - 29 mmol/L    Anion Gap 7 7 - 16 mmol/L    Glucose 128 (H) 74 - 99 mg/dL    BUN 30 (H) 6 - 23 mg/dL    CREATININE 1.0 0.7 - 1.2 mg/dL    GFR Non-African American >60 >=60 mL/min/1.73 GFR African American >60     Calcium 8.8 8.6 - 10.2 mg/dL    Total Protein 6.0 (L) 6.4 - 8.3 g/dL    Albumin 2.7 (L) 3.5 - 5.2 g/dL    Total Bilirubin 0.3 0.0 - 1.2 mg/dL    Alkaline Phosphatase 69 40 - 129 U/L    ALT 21 0 - 40 U/L    AST 47 (H) 0 - 39 U/L   Troponin   Result Value Ref Range    Troponin, High Sensitivity 366 (H) 0 - 11 ng/L   Lipase   Result Value Ref Range    Lipase 15 13 - 60 U/L   Brain Natriuretic Peptide   Result Value Ref Range    Pro- (H) 0 - 450 pg/mL   Lactate, Sepsis   Result Value Ref Range    Lactic Acid, Sepsis 1.5 0.5 - 1.9 mmol/L   Blood Gas, Arterial   Result Value Ref Range    Date Analyzed 20220409     Time Analyzed 2231     Source: Blood Arterial     pH, Blood Gas 7.468 (H) 7.350 - 7.450    PCO2 49.8 (H) 35.0 - 45.0 mmHg    PO2 80.3 75.0 - 100.0 mmHg    HCO3 35.3 (H) 22.0 - 26.0 mmol/L    B.E. 10.3 (H) -3.0 - 3.0 mmol/L    O2 Sat 95.0 92.0 - 98.5 %    O2Hb 93.9 (L) 94.0 - 97.0 %    COHb 0.9 0.0 - 1.5 %    MetHb 0.3 0.0 - 1.5 %    O2 Content 13.3 mL/dL    HHb 4.9 0.0 - 5.0 %    tHb (est) 10.0 (L) 11.5 - 16.5 g/dL    Mode NC- 4 L     Date Of Collection      Time Collected      Pt Temp 37.0 C     ID [id number]     Lab [id number]     Critical(s) Notified . No Critical Values    POCT Glucose   Result Value Ref Range    Glucose 120 mg/dL    QC OK? ok    POCT Glucose   Result Value Ref Range    Meter Glucose 120 (H) 74 - 99 mg/dL       RADIOLOGY:  Interpreted by Radiologist unless otherwise specified  CTA PULMONARY W CONTRAST   Final Result   No evidence of pulmonary embolism. New patchy ground-glass densities in the right lung most suggestive of viral   pneumonia versus drug reaction. No significant change in the left suprahilar large mass with invasion into   the mediastinum and complete obstruction of the left bronchus. There is   stable complete collapse of the left lower lobe with small left pleural   effusion and left hemidiaphragm elevation.       No significant the right lung, stable left lung mass and a left pleural effusion. Patient with mild leukocytosis. Patient initially moderately hypotensive, responded briskly to 500 cc fluid bolus (1 L in total ordered). Given patient's recent hospital admission, leukocytosis, procalcitonin was added, given findings concerning for viral pneumonia, respiratory panel ordered. Rapid COVID-19 negative here. Patient will be given broad-spectrum antibiotics for healthcare associated pneumonia coverage given recent hospital admission, patient's immunosuppressed status. Patient has not had treatment for his cancer in about 2 weeks due to general fatigue per patient's wife. Blood cultures were obtained prior to antibiotic administration. Patient with markedly elevated troponin, patient had elevated troponin on previous hospital admission, was evaluated by cardiology and no intervention was recommended. Patient has no chest pain here, suspect elevated troponin related to demand ischemia given patient's significant hypoxia. Patient will be admitted for further work-up, monitoring, treatment. Re-Evaluations:             ED Course as of 04/10/22 0015   Sat Apr 09, 2022 2137 EKG: This EKG is signed and interpreted by me. Rate: 85  Rhythm: Sinus and few PACs  Interpretation: no acute changes  Comparison: stable as compared to patient's most recent EKG   [RH]   2337 CT with New patchy ground-glass densities in the right lung most suggestive of viral   pneumonia versus drug reaction. [RH]   3580 Discussed case with Dr. Thalia Huber. He agreed to accept patient for admission.   [RH]      ED Course User Index  [RH] [address], DO         This patient's ED course included: a personal history and physicial examination, re-evaluation prior to disposition, multiple bedside re-evaluations, IV medications, cardiac monitoring, continuous pulse oximetry and complex medical decision making and emergency management    This patient has improved during their ED course. Consultations:  See ED Course    Counseling: The emergency provider has spoken with the patient and spouse/SO and discussed todays results, in addition to providing specific details for the plan of care and counseling regarding the diagnosis and prognosis. Questions are answered at this time and they are agreeable with the plan.       --------------------------------- IMPRESSION AND DISPOSITION ---------------------------------    IMPRESSION  1. Acute respiratory failure with hypoxia ([facility] [address]    2. Pneumonia of right lung due to infectious organism, unspecified part of lung    3. Sepsis due to pneumonia ([facility] [address]        DISPOSITION  Disposition: Admit to telemetry  Patient condition is serious but stable     Please note that the withdrawal or failure to initiate urgent interventions for this patient would likely result in a life threatening deterioration or permanent disability. See attending physician attestation/addendum for possible specific critical care time. NOTE: This report was transcribed using voice recognition software. Every effort was made to ensure accuracy; however, inadvertent computerized transcription errors may be present. Also please note that patient was seen and examined by attending physician. Plan of care and disposition discussed with attending physician and they were immediately available or present for all procedures performed.        -- Fransisco Brooks D.O. PGY-2     Resident Physician     Emergency Medicine      4/10/2022 12:15 AM        [address],   Resident  04/10/22 [facility],   Resident  04/10/22 [id number]

## 2022-04-11 NOTE — PROGRESS NOTES
P Quality Flow/Interdisciplinary Rounds Progress Note        Quality Flow Rounds held on April 11, 2022    Disciplines Attending:  Bedside Nurse, ,  and Nursing Unit Jenni was admitted on 4/9/2022  8:26 PM    Anticipated Discharge Date:       Disposition:    Eliecer Score:  Eliecer Scale Score: 14    Readmission Risk              Risk of Unplanned Readmission:  24           Discussed patient goal for the day, patient clinical progression, and barriers to discharge. The following Goal(s) of the Day/Commitment(s) have been identified:  Monitor labs, check primary plan, and discharge planning home.       Andree Morel RN  April 11, 2022

## 2022-04-11 NOTE — PROGRESS NOTES
INPATIENT CARDIOLOGY FOLLOW-UP    Name: Esme Bowman    Age: 79 y.o. Date of Admission: 4/9/2022  8:26 PM    Date of Service: 4/11/2022    Chief Complaint: Follow-up for NSTEMI/elevated troponin. Interim History:  No new overnight cardiac complaints. Currently with no complaints of CP, SOB, palpitations, dizziness, or lightheadedness. SR on telemetry.     Review of Systems:   Negative except as described above    Problem List:  Patient Active Problem List   Diagnosis    Essential hypertension, benign    [facility] ([facility]    Hyperlipidemia    Malignant neoplasm of upper lobe of left lung ([facility]    [facility]) [facility]    NSTEMI, initial episode of care [facility]    Acute respiratory failure with hypoxia ([facility]    Pneumonia       Current Medications:    Current Facility-Administered Medications:     white petrolatum ointment, , Topical, BID, Axel Hric, DO    sodium chloride flush 0.9 % injection 5-40 mL, 5-40 mL, IntraVENous, 2 times per day, Koby Fears, APRN - CNP, 10 mL at 04/10/22 2032    sodium chloride flush 0.9 % injection 5-40 mL, 5-40 mL, IntraVENous, PRN, Koby Fears, APRN - CNP    0.9 % sodium chloride infusion, , IntraVENous, PRN, Koby Fears, APRN - CNP    ondansetron (ZOFRAN-ODT) disintegrating tablet 4 mg, 4 mg, Oral, Q8H PRN **OR** ondansetron (ZOFRAN) injection 4 mg, 4 mg, IntraVENous, Q6H PRN, Koby Fears, APRN - CNP    polyethylene glycol (GLYCOLAX) packet 17 g, 17 g, Oral, Daily PRN, Koby Fears, APRN - CNP    acetaminophen (TYLENOL) tablet 650 mg, 650 mg, Oral, Q6H PRN, 650 mg at 04/10/22 1704 **OR** acetaminophen (TYLENOL) suppository 650 mg, 650 mg, Rectal, Q6H PRN, Koby Fears, APRN - CNP    sennosides-docusate sodium (SENOKOT-S) 8.6-50 MG tablet 1 tablet, 1 tablet, Oral, BID, Koby Amos, APRN - CNP, 1 tablet at 04/10/22 2032    ipratropium-albuterol (DUONEB) nebulizer solution 1 ampule, 1 ampule, Inhalation, Q4H WA, Graham Issa, APRN - CNP, 1 ampule at 04/10/22 2053    0.9 % sodium chloride infusion, , IntraVENous, Continuous, Graham Bones, APRN - CNP, Last Rate: 100 mL/hr at 04/10/22 0240, New Bag at 04/10/22 0240    enoxaparin (LOVENOX) injection 70 mg, 1 mg/kg, SubCUTAneous, BID, Graham Bones, APRN - CNP, 70 mg at 04/11/22 0231    pantoprazole (PROTONIX) tablet 40 mg, 40 mg, Oral, QAM AC, Graham Luis Manuel, APRN - CNP, 40 mg at 04/11/22 0620    levothyroxine (SYNTHROID) tablet 50 mcg, 50 mcg, Oral, Daily, Graham Bones, APRN - CNP, 50 mcg at 04/11/22 0620    atorvastatin (LIPITOR) tablet 80 mg, 80 mg, Oral, Daily, Graham Bones, APRN - CNP, 80 mg at 04/10/22 2032    ferrous sulfate (IRON 325) tablet 325 mg, 325 mg, Oral, Daily, Graham Bones, APRN - CNP, 325 mg at 04/10/22 0859    FLUoxetine (PROZAC) capsule 20 mg, 20 mg, Oral, Daily, Graham Bones, APRN - CNP, 20 mg at 15/74/93 7270    folic acid (FOLVITE) tablet 1 mg, 1 mg, Oral, Daily, Graham Bones, APRN - CNP, 1 mg at 04/10/22 0900    magnesium hydroxide (MILK OF MAGNESIA) 400 MG/5ML suspension 30 mL, 30 mL, Oral, Daily, Graham Bones, APRN - CNP, 30 mL at 04/10/22 0859    benzonatate (TESSALON) capsule 100 mg, 100 mg, Oral, TID PRN, Ata Castellon DO, 100 mg at 04/10/22 1121    aspirin chewable tablet 81 mg, 81 mg, Oral, Daily, Katie Stai, APRN - CNP, 81 mg at 04/10/22 0859    glucagon (rDNA) injection 1 mg, 1 mg, IntraMUSCular, PRN, Allison Morin PA-C    dextrose 5 % solution, 100 mL/hr, IntraVENous, PRN, KANDY Calvillo-FREDERICK    dextrose bolus (hypoglycemia) 10% 125 mL, 125 mL, IntraVENous, PRN **OR** dextrose bolus (hypoglycemia) 10% 250 mL, 250 mL, IntraVENous, PRN, KANDY Calvillo-FREDERICK    glucose chewable tablet 4 each, 4 each, Oral, PRN, Allison Morin PA-C    dronabinol (MARINOL) capsule 2.5 mg, 2.5 mg, Oral, BID, Amal Jose Morin PA-C, 2.5 mg at 04/10/22 2032    insulin lispro (HUMALOG) injection vial 0-12 Units, 0-12 Units, SubCUTAneous, TID WC, Axel Hric, DO, 4 Units at 04/11/22 0620    insulin lispro (HUMALOG) injection vial 0-6 Units, 0-6 Units, SubCUTAneous, Nightly, Axel Hric, DO, 2 Units at 04/10/22 2032    insulin glargine (LANTUS) injection vial 20 Units, 20 Units, SubCUTAneous, Nightly, Axel Hric, DO, 20 Units at 04/10/22 1611    melatonin tablet 6 mg, 6 mg, Oral, Nightly PRN, Zulay Munguia, APRN - CNP, 6 mg at 04/10/22 2032    0.9 % sodium chloride bolus, 1,000 mL, IntraVENous, Once, Gaurav Astudillo,     Physical Exam:  BP (!) 117/55   Pulse 91   Temp 98.9 °F (37.2 °C) (Oral)   Resp 18   Ht 5' 10\" (1.778 m)   Wt 165 lb (74.8 kg)   SpO2 100%   BMI 23.68 kg/m²   Wt Readings from Last 3 Encounters:   04/09/22 165 lb (74.8 kg)   03/17/22 179 lb 3.7 oz (81.3 kg)   11/16/21 187 lb (84.8 kg)     Appearance: Awake, alert, no acute respiratory distress  Skin: Intact, no rash  Head: Normocephalic, atraumatic  Eyes: EOMI, no conjunctival erythema  ENMT: No pharyngeal erythema, MMM, no rhinorrhea  Neck: Supple, no elevated JVP, no carotid bruits  Lungs: Course rhonchi appreciated bilaterally. Cardiac: PMI nondisplaced, Regular rhythm with a normal rate, S1 & S2 normal, no murmurs  Abdomen: Soft, nontender, +bowel sounds  Extremities: Moves all extremities x 4, no lower extremity edema  Neurologic: No focal motor deficits apparent, normal mood and affect  Peripheral Pulses: Intact posterior tibial pulses bilaterally    Intake/Output:  No intake or output data in the 24 hours ending 04/11/22 0959  No intake/output data recorded.     Laboratory Tests:  Recent Labs     04/09/22  2144 04/09/22  2144 04/09/22  2213 04/10/22  0606 04/11/22  0235     --   --  135 138   K 3.7  --   --  3.4* 4.4   CL 89*  --   --  92* 98   CO2 39*  --   --  37* 34*   BUN 30*  --   --  23 23   CREATININE 1.0  --   --  0.8 0.7   GLUCOSE 128*   < > 120 173* 208*   CALCIUM 8.8 --   --  8.1* 8.3*    < > = values in this interval not displayed. Lab Results   Component Value Date    MG 2.2 04/11/2022     Recent Labs     04/09/22  2144 04/10/22  0606 04/11/22  0235   ALKPHOS 69 65 64   ALT 21 21 22   AST 47* 44* 45*   PROT 6.0* 5.7* 5.2*   BILITOT 0.3 0.3 0.3   LABALBU 2.7* 2.4* 2.1*     Recent Labs     04/09/22 2144 04/11/22  0235   WBC 15.4* 15.3*   RBC 3.32* 2.99*   HGB 9.5* 8.4*   HCT 30.6* 27.4*   MCV 92.2 91.6   MCH 28.6 28.1   MCHC 31.0* 30.7*   RDW 20.0* 19.8*   * 455*   MPV 10.0 9.8     Lab Results   Component Value Date    CKTOTAL 357 (H) 04/10/2022     Lab Results   Component Value Date    INR 1.4 03/14/2022    PROTIME 15.3 (H) 03/14/2022     Lab Results   Component Value Date    TSH 1.75 08/22/2018     No results found for: LABA1C  No results found for: EAG  Lab Results   Component Value Date    CHOL 120 02/17/2020    CHOL 116 02/26/2019    CHOL 128 08/22/2018     Lab Results   Component Value Date    TRIG 77 02/17/2020    TRIG 67 02/26/2019    TRIG 60 08/22/2018     Lab Results   Component Value Date    HDL 41 02/17/2020    HDL 42 02/26/2019    HDL 50 08/22/2018     Lab Results   Component Value Date    LDLCALC 63 02/17/2020    LDLCALC 60 02/26/2019    LDLCALC 64 08/22/2018     No results found for: LABVLDL, VLDL  Lab Results   Component Value Date    CHOLHDLRATIO 2.9 02/17/2020    CHOLHDLRATIO 2.8 02/26/2019    CHOLHDLRATIO 2.6 08/22/2018     Recent Labs     04/09/22 2144   PROBNP 584*       EKG 4/10/2022: sinus rhythm with PAC's. Telemetry: NSR    04/09/2022 CXR:   No significant change compared to the prior exam on 03/14/2022 demonstrating the left lung mass with left lower lobe collapse and pleural effusion.     04/09/2022 CTA Chest:   No evidence of pulmonary embolism. New patchy ground-glass densities in the right lung most suggestive of viral  pneumonia versus drug reaction.   No significant change in the left suprahilar large mass with invasion into  the mediastinum and complete obstruction of the left bronchus.  There is  stable complete collapse of the left lower lobe with small left pleural  effusion and left hemidiaphragm elevation. No significant change in left adrenal metastasis.    ----------------------------------------------------------------------------------------------------------------------------------------------------------------  IMPRESSION:  1. NSTEMI, troponins peaked at 366 and down-trended. Conservative management. 2. Leukocytosis   3. Poor appetite and dehydration, 2/2 chemotherapy  4. Possible viral pneumonia   5. Stage IV Lung Cancer s/p radiation and starting Gemzar next week. 6. HTN, running low, observe. 7. HLD, on statin  8. DM, managed per primary   9. Peripheral neuropathy   10. Hypothyroidism, on synthroid   11. Anemia, observe   12. Hx prostate cancer s/p radiation. 13. Remote tobacco abuse. 14. Depression     RECOMMENDATIONS:     Conservative medical management.  Continue statin and aspirin.  Monitor blood pressure, holding home lasix due to soft blood pressure and improving.  Continue therapeutic lovenox for a total of 48 hours, Hg stable. Electronically signed by Fernando Medina MD on 4/11/2022 at 10:02 AM    NOTE: This report was transcribed using voice recognition software. Every effort was made to ensure accuracy; however, inadvertent computerized transcription errors may be present.

## 2022-04-11 NOTE — PROGRESS NOTES
[address] [place] [address] [facility]  AKSHAT  Progress Note    SUBJECTIVE:  Chief Complaint   Patient presents with    Shortness of Breath     hx of lung CA. SpO2 in the 60's when EMS arrived at patients home     Patient is lying in bed, wife present  Not eating much. Says he's feeling ok   No nausea, vomiting, diarrhea. Review of systems:  As stated above in the chief complaint, otherwise negative. Medications:  Scheduled Meds:   white petrolatum   Topical BID    sodium phosphate IVPB  10 mmol IntraVENous Once    sodium chloride flush  5-40 mL IntraVENous 2 times per day    sennosides-docusate sodium  1 tablet Oral BID    ipratropium-albuterol  1 ampule Inhalation Q4H WA    enoxaparin  1 mg/kg SubCUTAneous BID    pantoprazole  40 mg Oral QAM AC    levothyroxine  50 mcg Oral Daily    atorvastatin  80 mg Oral Daily    ferrous sulfate  325 mg Oral Daily    FLUoxetine  20 mg Oral Daily    folic acid  1 mg Oral Daily    magnesium hydroxide  30 mL Oral Daily    aspirin  81 mg Oral Daily    dronabinol  2.5 mg Oral BID    insulin lispro  0-12 Units SubCUTAneous TID WC    insulin lispro  0-6 Units SubCUTAneous Nightly    insulin glargine  20 Units SubCUTAneous Nightly    sodium chloride  1,000 mL IntraVENous Once     Continuous Infusions:   sodium chloride      sodium chloride 100 mL/hr at 04/10/22 0240    dextrose       PRN Meds:sodium chloride flush, sodium chloride, ondansetron **OR** ondansetron, polyethylene glycol, acetaminophen **OR** acetaminophen, benzonatate, glucagon (rDNA), dextrose, dextrose bolus (hypoglycemia) **OR** dextrose bolus (hypoglycemia), glucose, melatonin    OBJECTIVE:  BP (!) 117/55   Pulse 91   Temp 98.9 °F (37.2 °C) (Oral)   Resp 18   Ht 5' 10\" (1.778 m)   Wt 165 lb (74.8 kg)   SpO2 100%   BMI 23.68 kg/m²   Temp  Av.9 °F (37.2 °C)  Min: 98.8 °F (37.1 °C)  Max: 99.1 °F (37.3 °C)  Constitutional: The patient is awake, alert, and oriented.  Frail, weak.   Skin: Warm and dry. No rashes were noted. HEENT: Round and reactive pupils. Moist mucous membranes. No ulcerations or thrush. Neck: Supple to movements. Chest: No use of accessory muscles to breathe. Symmetrical expansion. No wheezing, crackles or rhonchi. Cardiovascular: S1 and S2 are rhythmic and regular. No murmurs appreciated. Abdomen: Positive bowel sounds to auscultation. Benign to palpation. No masses felt. Extremities: No edema.   Lines: peripheral  Left mediport- not accessed    Laboratory and Tests Review:  Lab Results   Component Value Date    WBC 15.3 (H) 04/11/2022    WBC 15.4 (H) 04/09/2022    WBC 10.8 03/17/2022    HGB 8.4 (L) 04/11/2022    HCT 27.4 (L) 04/11/2022    MCV 91.6 04/11/2022     (H) 04/11/2022     Lab Results   Component Value Date    NEUTROABS 13.14 (H) 04/11/2022    NEUTROABS 13.31 (H) 04/09/2022    NEUTROABS 5.70 03/15/2022     No results found for: CRP  Lab Results   Component Value Date    ALT 22 04/11/2022    AST 45 (H) 04/11/2022    ALKPHOS 64 04/11/2022    BILITOT 0.3 04/11/2022     Lab Results   Component Value Date     04/11/2022    K 4.4 04/11/2022    CL 98 04/11/2022    CO2 34 04/11/2022    BUN 23 04/11/2022    CREATININE 0.7 04/11/2022    CREATININE 0.8 04/10/2022    CREATININE 1.0 04/09/2022    GFRAA >60 04/11/2022    LABGLOM >60 04/11/2022    GLUCOSE 208 04/11/2022    PROT 5.2 04/11/2022    LABALBU 2.1 04/11/2022    CALCIUM 8.3 04/11/2022    BILITOT 0.3 04/11/2022    ALKPHOS 64 04/11/2022    AST 45 04/11/2022    ALT 22 04/11/2022     No results found for: CRP  No results found for: [address]  Radiology:  Noted     Microbiology:   Blood cultures: negative so far  Respiratory Viral Panel: not detected  Streptococcus pneumoniae/Legionella urine Ag: pending     Recent Labs     04/09/22  2344   PROCAL 0.19*       ASSESSMENT:  · Lung cancer, undergoing chemotherapy with Gemzar  · Poor appetite and dehydration associated to chemotherapy  · Possible viral pneumonia. No evidence of lung consolidation consistent with bacterial pneumonia  · Leukocytosis    PLAN:  · Continue to monitor off antibiotics  · Labs reviewed   · Supportive treatment     ISHMAEL Maher CNP  11:43 AM  4/11/2022     Patient seen and examined. I had a face to face encounter with the patient. Agree with exam.  Assessment and plan as outlined above and directed by me. Addition and corrections were done as deemed appropriate. My exam and plan include: The patient says that he is feeling rough. He has not had any more fevers. He is off antibiotics. He is voicing willingness to talk to him go to hospice. Wife is agreeable. No antibiotics. Continue supportive treatment.     Steven Snyder MD  4/11/2022  1:13 PM

## 2022-04-11 NOTE — PROGRESS NOTES
Palliative Care Department  [phone]  Palliative Care Progress Note  Provider ISHMAEL Martins - SUSIE    Marty Mae  [id number]  Hospital Day: 3  Date of Initial Consult: 4/10/2022  Referring Provider: Nitesh Rios CNP  Palliative Medicine was consulted for assistance with: [address], famiily support, symptom management  Chief Complaint Today:  Cough, dry mouth    Brief Summary of Hospital Course:   Marty Mae is a 79 y.o. with a medical history of stage 4 lung cancer who was admitted on 4/9/2022 from home with a CHIEF COMPLAINT of SOB. ASSESSMENT/PLAN:     Recommendations:     Cough :   -  Prn tessalon perles      Pertinent Hospital Problems      Stage 4 lung cancer with immunocompromise from chemotherapy now with right sided pneumonia      Palliative Care Encounter / Counseling Regarding Goals of Care  Please see detailed goals of care discussion as below   At this time, Marty Mae, Does have capacity for medical decision-making. Capacity is time limited and situation/question specific   During encounter did not need surrogate medical decision-maker   Outcome of goals of care meeting: Consult hospice for information only   Code status Limited no to all but meds   Advanced Directives: no POA or living will in epic   Surrogate/Legal NOK:  o Wife Zoila Fried     Spiritual assessment: no spiritual distress identified  Bereavement and grief: to be determined  Referrals to: none today    SUBJECTIVE:     Details of Conversation:  Met with patient and wife at bedside. Discussion regarding goals of care and CODE STATUS. Patient and wife are both in agreement to consult hospice. They state they have been considering this option and would like to get more information. They were choiced for hospice companies and wish to speak with hospice  the [place]. Support provided.       History Of Present Illness:    Per H&P  \"Mr Cabrera presented to the ED with stage IV lung cancer followed with Dr. Palacio at [facility] complaints of dyspnea, loss of appetite with 9 lbs weight loss and generalized weakness that started about 2 weeks ago. Patient is wheelchair-bound and has been near syncopal when attempting to stand. He has had little to eat or drink in the past two weeks. Home pulse ox showed low 70s and is currently on 4L. He is on active chemo and radiation but has not received treatment due to the above symptoms for the past 3 weeks. On room air in the ED he was 82%, on the 4L he is 94-95%. Initially he was hypotensive 70/45 but improved to 124/61 with 2L crystalloid bolus. CTA chest was done and showed new right lung ground glass densities. He was treated in the ED with vanc, zithro, cefepime and will be admitted for pneumonia. \"    Past Medical History:          Diagnosis Date    Diabetes mellitus without complication ([address]     Epiretinal membrane, left eye 10/2019    Full dentures     History of depression     Hyperlipidemia     Hypertension     Hypothyroidism     Peripheral neuropathy     feet, affects balance at times    Prostate cancer [facility])     treated with radiation    Use of cane as ambulatory aid        Past Surgical History:          Procedure Laterality Date    APPENDECTOMY      BRONCHOSCOPY N/A 10/24/2019    BRONCHOSCOPY WITH ANESTHESIA (CYTOLOGY NOTIFIED) performed by Caryn Galicia MD at [address] [place] [address] Bilateral     CIRCUMCISION      COLONOSCOPY      EYE SURGERY      eyelids    TOOTH EXTRACTION      full mouth    VITRECTOMY Left 2/14/2019    PARS PLANA VITRECTOMY 25 GAUGE MACULAR PUCKER REPAIR AIR FLUID EXCHANGE LEFT EYE performed by Ely Leung MD at [facility] VITRECTOMY Left 10/10/2019    PARS PLANA VITRECTOMY 25 GAUGE INTERNAL LIMITING MEMBRANE PEEL OZURDEX INJECTION  LEFT YE E performed by Ely Leung MD at [facility] OR       Medications Prior to Admission:      Prior to Admission medications Medication Sig Start Date End Date Taking? Authorizing Provider   insulin glargine (BASAGLAR KWIKPEN) 100 UNIT/ML injection pen Inject 50 Units into the skin nightly   Yes Historical Provider, MD   nystatin (MYCOSTATIN) 145156 UNIT/GM powder Apply topically 2 times daily Apply 3 times daily. 4/6/22   Redia Hams, DO   FLUoxetine (PROZAC) 20 MG capsule Take 1 capsule by mouth daily 4/4/22   Redia Hams, DO   sennosides-docusate sodium (SENOKOT-S) 8.6-50 MG tablet Take 2 tablets by mouth nightly 3/17/22 4/16/22  KANDY Camargo   magnesium hydroxide (MILK OF MAGNESIA) 400 MG/5ML suspension Take 30 mLs by mouth daily 3/17/22 4/16/22  KANDY Camargo   insulin lispro (HUMALOG) 100 UNIT/ML injection vial Inject 0-12 Units into the skin 3 times daily (with meals) 3/17/22   HERBER Fan   furosemide (LASIX) 40 MG tablet Take 0.5 tablets by mouth daily 3/17/22   HERBER Fan   pantoprazole (PROTONIX) 40 MG tablet Take 40 mg by mouth daily    Historical Provider, MD   ferrous sulfate (IRON 325) 325 (65 Fe) MG tablet Take 325 mg by mouth daily    Historical Provider, MD   folic acid (FOLVITE) 1 MG tablet Take 1 mg by mouth daily    Historical Provider, MD   diphenhydrAMINE-APAP, sleep, (TYLENOL PM EXTRA STRENGTH)  MG tablet Take 1 tablet by mouth nightly as needed for Sleep    Historical Provider, MD   Coenzyme Q10 (CO Q 10 PO) Take 200 mg by mouth daily     Historical Provider, MD   [address] TEST 4 TIMES DAILY 9/10/19   Historical Provider, MD   CONTOUR NEXT TEST strip TEST FOUR TIMES DAILY 9/10/19   Historical Provider, MD   atorvastatin (LIPITOR) 80 MG tablet Take 80 mg by mouth daily     Historical Provider, MD   levothyroxine (SYNTHROID) 25 MCG tablet Take 50 mcg by mouth Daily  9/15/16   Historical Provider, MD   aspirin 81 MG tablet Take 81 mg by mouth daily     Historical Provider, MD       Allergies:  Patient has no known allergies.     Social History:      The patient currently lives at home    TOBACCO:   reports that he quit smoking about 37 years ago. His smoking use included cigarettes. He has a 37.50 pack-year smoking history. He has never used smokeless tobacco.  ETOH:   reports current alcohol use. Family History:       Reviewed in detail and positive as follows:        Problem Relation Age of Onset    Diabetes type 2  Mother     Heart Failure Mother     Heart Attack Father        REVIEW OF SYSTEMS:   Pertinent positives as noted in the HPI. All other systems reviewed and negative.     OBJECTIVE:   Prognosis: Poor and Guarded    Physical Exam:  BP (!) 117/55   Pulse 91   Temp 98.9 °F (37.2 °C) (Oral)   Resp 18   Ht 5' 10\" (1.778 m)   Wt 165 lb (74.8 kg)   SpO2 100%   BMI 23.68 kg/m²     Constitutional:  Elderly, awake, alert, appears uncomfortable, quiet/hoarse voice  Eyes: no scleral icterus, normal lids, no discharge  ENMT:  Normocephalic, atraumatic, mucosa dry, EOMI  Neck:  trachea midline, no JVD  Lungs:  audible rhonchi ,no wheezes noted, respirations unlabored but tachypneic, no retractions  Heart:  RRR, distant heart tones, no murmur, rub, or gallop noted during exam  Abd:  Soft, non tender, non distended, bowel sounds present  Ext:  Moving all extremities, no edema, pulses present  Skin:  Warm and dry, no rashes on visible skin, pale  Neuro:  Alert, grossly nonfocal; following commands    Objective data reviewed: labs, images, records, medication use, vitals and chart    Labs:     Recent Labs     04/09/22 2144 04/11/22  0235   WBC 15.4* 15.3*   HGB 9.5* 8.4*   HCT 30.6* 27.4*   * 455*     Recent Labs     04/09/22  2144 04/10/22  0606 04/11/22  0235    135 138   K 3.7 3.4* 4.4   CL 89* 92* 98   CO2 39* 37* 34*   BUN 30* 23 23   CREATININE 1.0 0.8 0.7   CALCIUM 8.8 8.1* 8.3*   PHOS  --   --  1.9*     Recent Labs     04/09/22  2144 04/10/22  0606 04/11/22  0235   AST 47* 44* 45*   ALT 21 21 22   BILITOT 0.3 0.3 0.3   ALKPHOS 69 65 64     No results for input(s): INR in the last 72 hours. Recent Labs     04/10/22  0606   CKTOTAL 357*       Urinalysis:      Lab Results   Component Value Date    NITRU Negative 07/01/2017    BLOODU Negative 07/01/2017    SPECGRAV 1.015 07/01/2017    GLUCOSEU Negative 07/01/2017         Discussed patient and the plan of care with the other IDT members: Palliative Medicine IDT Team, Floor Nurse, Patient and Family    Time/Communication  Greater than 50% of time spent, total 15 minutes in counseling and coordination of care at the bedside regarding goals of care, symptom management, diagnosis and prognosis and see above. Thank you for allowing Palliative Medicine to participate in the care of Jose Corbin.

## 2022-04-11 NOTE — ACP (ADVANCE CARE PLANNING)
Advance Care Planning   Healthcare Decision Maker:    Primary Decision Maker: Roya Del Angel - Spouse - [phone]    Secondary Decision Maker: Abby Heaton - Brother/Sister - [phone]      Today we documented Decision Maker(s) consistent with Legal Next of Kin hierarchy.

## 2022-04-11 NOTE — PROGRESS NOTES
Physical Therapy    Facility/Department: [facility] SURG  Initial Assessment    NAME: Domenic Raman  : 1940  MRN: [id number]    Date of Service: 2022       REQUIRES PT FOLLOW UP: Yes       Patient Diagnosis(es): The primary encounter diagnosis was Acute respiratory failure with hypoxia ([facility] [address] Diagnoses of Pneumonia of right lung due to infectious organism, unspecified part of lung and Sepsis due to pneumonia [facility]) were also pertinent to this visit. has a past medical history of Diabetes mellitus without complication ([address] Epiretinal membrane, left eye, Full dentures, History of depression, Hyperlipidemia, Hypertension, Hypothyroidism, Peripheral neuropathy, Prostate cancer ([facility] [address] and Use of cane as ambulatory aid. has a past surgical history that includes eye surgery; Appendectomy; Circumcision; vitrectomy (Left, 2019); Cataract removal with implant (Bilateral); Colonoscopy; vitrectomy (Left, 10/10/2019); Tooth Extraction; and bronchoscopy (N/A, 10/24/2019). Evaluating Therapist: Ke Andujar PT      Referring Provider:  Perico Valdez PA-C     PT order : PT eval and treat      Room #:  604  DIAGNOSIS:  PNA, acute resp failure   H/o lung CA      PRECAUTIONS: falls     Social:  Pt lives with  Wife  in a  1  floor plan  3 steps and 1 rails to enter. Prior to admission: pt's wife reports she assists with transfers to a w/c and she pushes the w/c        Initial Evaluation  Date: 2022  Treatment      Short Term/ Long Term   Goals   Was pt agreeable to Eval/treatment? yes        Does pt have pain?  none reported        Bed Mobility  Rolling:  NT   Supine to sit:  Mod/max assist   Sit to supine:  NT   Scooting:  Max assist     min assist     Transfers Sit to stand: mod assist   Stand to sit:  Mod assist   Stand pivot:   Mod assist     min assist    Ambulation   NT    N/A    LE ROM  AAROM WFL        LE strength  3- to 4-/ 5    Increase by 1-2/ 3 MMT grade    AM- PAC RAW score 10/ 24                 Pt is alert; follows commands       Balance:  Mod/max assist in stand , high fall risk due to weakness and poor balance  Endurance: poor   Bed/Chair alarm: Yes      ASSESSMENT    Pt displays functional ability as noted in the objective portion of this evaluation.          Conditions Requiring Skilled Therapeutic Intervention:     [x]? Decreased strength                                      []?Decreased ROM  [x]? Decreased functional mobility  [x]? Decreased balance   [x]? Decreased endurance   [x]? Decreased posture  []? Decreased sensation  [x]? Decreased coordination   []? Decreased vision  []? Decreased safety awareness   []? Increased pain                Treatment/Education:    Pt in bed upon arrival ; agreeable to PT. Pt's wife present. Mod assist xwith bed to chair, no AD used. . Cues given for hand placement with transfers. Pt in flexed position in stand/transfer. Mod/max assist sit to chair twice from bedside chair for hygiene purposes. O2@ 3 LNC . SpO2> 90% throughout session. Pt needed encouragement to stay up in chair .          Pt educated on fall risk,  Safe and proper technique with mobility         Patient response to education:   Pt verbalized understanding Pt demonstrated skill Pt requires further education in this area   x  with cues   x         Comments:  Pt left  In recliner after session, with call light in reach. Waffle cushion placed         Rehab potential is Good for reaching above PT goals.       Pts/ family goals     1. None stated       Patient and or family understand(s) diagnosis, prognosis, and plan of care. - yes      PLAN    PT care will be provided in accordance with the objectives noted above. Whenever appropriate, clear delegation orders will be provided for nursing staff. Exercises and functional mobility practice will be used as well as appropriate assistive devices or modalities to obtain goals.  Patient and family education will also be administered as needed.           PLAN OF CARE:     Current Treatment Recommendations      [x]? Strengthening to improve independence with functional mobility   [x]? ROM to improve independence with functional mobility   [x]? Balance Training to improve static/dynamic balance and to reduce fall risk  [x]? Endurance Training to improve activity tolerance during functional mobility   [x]? Transfer Training to improve safety and independence with all functional transfers   []? Gait Training to improve gait mechanics, endurance and assess need for appropriate assistive device  []? Stair Training in preparation for safe discharge home and/or into the community   [x]? Positioning to prevent skin breakdown and contractures  [x]? Safety and Education Training   [x]? Patient/Caregiver Education   []? HEP  []? Other      Frequency of treatments will be 2-5x/week x  7 -14 days.     Time in: 1423   Time out:  1448         Evaluation Time includes thorough review of current medical information, gathering information on past medical history/social history and prior level of function, completion of standardized testing/informal observation of tasks, assessment of data and education on plan of care and goals.     CPT codes:  [x]? Low Complexity PT evaluation [id number]  []? Moderate Complexity PT evaluation 63704  []? High Complexity PT evaluation [id number]  []? PT Re-evaluation [id number]  []? Gait training 49679  minutes  [x]? Therapeutic activities 73581  10 minutes  []? Therapeutic exercises 28448  minutes  []?  Neuromuscular reeducation 33872  minutes         [address] number:  PT 6527

## 2022-04-11 NOTE — CARE COORDINATION
3:45pm - Spoke with Delfin Benton wife, who states that they have not made a decision for hospice yet. They would like tonight to think about it. We spoke about the hospice philosophy and what that would look like for them. Annita shares that Soledad Churchill was diagnosed 30 months ago and until now has done very well on chemo and radiation. Annita would like us to follow up tomorrow and see if they have questions or have made a choice for hospice.

## 2022-04-11 NOTE — CARE COORDINATION
Social work / Discharge planning:       Social work attempted to see patient for initial assessment but he was not available. Patient is known to social work from recent hospitalization. He lives with his wife and uses a wc and owns a walker. Patient discharge to [address] [place] [address] following his last hospitalization and has used [facility].    PT/OT evaluations ordered. Social work will follow. Will need to determine if he has home oxygen.     Electronically signed by WHITNEY Martin on 4/11/2022 at 11:23 AM

## 2022-04-11 NOTE — PROGRESS NOTES
Comprehensive Nutrition Assessment    Type and Reason for Visit:  Initial,Positive Nutrition Screen    Nutrition Recommendations/Plan: Continue Current Diet,Modify Oral Nutrition Supplement     Discontinue Ensure TID and Start Glucerna TID and Boost Pudding daily    Current Phos 1.9    Nutrition Assessment:  Pt w/ acute respiratory failure 2/2 PNA. Hx DM/lung CA on chemo/XRT, note pt considering hospice. Will modify ONS per discussion w/ pt & pt's wife, and continue to [facility]. Malnutrition Assessment:  Malnutrition Status: At risk for malnutrition (Comment)    Context:  Chronic Illness     Findings of the 6 clinical characteristics of malnutrition:  Energy Intake:  Mild decrease in energy intake (Comment)  Weight Loss:  No significant weight loss     Body Fat Loss:  No significant body fat loss     Muscle Mass Loss:  No significant muscle mass loss    Fluid Accumulation:  No significant fluid accumulation     Strength:  Not Performed    Estimated Daily Nutrient Needs:  Energy (kcal):  ; Weight Used for Energy Requirements:  Current     Protein (g):   (1.3-1.5); Weight Used for Protein Requirements:  Ideal        Fluid (ml/day):  ; Method Used for Fluid Requirements:  1 ml/kcal      Nutrition Related Findings:  A&O X4, generalized +1/BLE +1 edema, abd soft, +BS, Phos 1.9, Marinol ordered      Wounds:   (buttock- dried, yellow/brown area)       Current Nutrition Therapies:    ADULT ORAL NUTRITION SUPPLEMENT; Breakfast, Lunch, Dinner; Standard High Calorie/High Protein Oral Supplement  ADULT DIET; Regular    Anthropometric Measures:  · Height: 5' 10\" (177.8 cm)  · Current Body Weight: 182 lb (82.6 kg) (4/11 bed per RD)   · Admission Body Weight: 182 lb (82.6 kg) (4/11 bed)    · Usual Body Weight: 179 lb (81.2 kg) (3/2022 actual per EMR)     · Ideal Body Weight: 166 lbs; % Ideal Body Weight 109.6 %   · BMI: 26.1  · BMI Categories: Overweight (BMI 25.0-29. 9)       Nutrition Diagnosis: · Inadequate oral intake related to early satiety as evidenced by poor intake prior to admission,intake 0-25%    Nutrition Interventions:   Nutrition Education/Counseling:  Education initiated (discussed appetite/ONS w/ pt & pt's wife)   Coordination of Nutrition Care:  Continue to monitor while inpatient    Goals:  Pt to consume >25% of meals/ONS       Nutrition Monitoring and Evaluation:   Food/Nutrient Intake Outcomes:  Food and Nutrient Intake,Supplement Intake  Physical Signs/Symptoms Outcomes:  Biochemical Data,GI Status,Fluid Status or Edema,Nutrition Focused Physical Findings,Skin,Weight     Discharge Planning:     Too soon to determine     Electronically signed by Sandy Leung RD, LD on 4/11/22 at 3:32 PM EDT  Contact: [phone]

## 2022-04-11 NOTE — PROGRESS NOTES
[facility]    Admitting Date and Time: 4/9/2022  8:26 PM  Admit Dx: Pneumonia [J18.9]  Acute respiratory failure with hypoxia ([address] [J96.01]  Sepsis due to pneumonia ([address] [J18.9, A41.9]  Pneumonia of right lung due to infectious organism, unspecified part of lung [J18.9]    Subjective:  Patient is being followed for Pneumonia [J18.9]  Acute respiratory failure with hypoxia ([address] [J96.01]  Sepsis due to pneumonia ([address] [J18.9, A41.9]  Pneumonia of right lung due to infectious organism, unspecified part of lung [J18.9]      Patient was lying in bed in no acute distress. Wife at bedside. They are interested in [facility] or SNF after discharge. ROS: denies fever, chills, cp, sob, n/v, HA unless stated above.       sodium chloride flush  5-40 mL IntraVENous 2 times per day    sennosides-docusate sodium  1 tablet Oral BID    ipratropium-albuterol  1 ampule Inhalation Q4H WA    enoxaparin  1 mg/kg SubCUTAneous BID    pantoprazole  40 mg Oral QAM AC    levothyroxine  50 mcg Oral Daily    atorvastatin  80 mg Oral Daily    ferrous sulfate  325 mg Oral Daily    FLUoxetine  20 mg Oral Daily    folic acid  1 mg Oral Daily    magnesium hydroxide  30 mL Oral Daily    aspirin  81 mg Oral Daily    dronabinol  2.5 mg Oral BID    insulin lispro  0-12 Units SubCUTAneous TID WC    insulin lispro  0-6 Units SubCUTAneous Nightly    insulin glargine  20 Units SubCUTAneous Nightly    sodium chloride  1,000 mL IntraVENous Once     sodium chloride flush, 5-40 mL, PRN  sodium chloride, , PRN  ondansetron, 4 mg, Q8H PRN   Or  ondansetron, 4 mg, Q6H PRN  polyethylene glycol, 17 g, Daily PRN  acetaminophen, 650 mg, Q6H PRN   Or  acetaminophen, 650 mg, Q6H PRN  benzonatate, 100 mg, TID PRN  glucagon (rDNA), 1 mg, PRN  dextrose, 100 mL/hr, PRN  dextrose bolus (hypoglycemia), 125 mL, PRN   Or  dextrose bolus (hypoglycemia), 250 mL, PRN  glucose, 4 each, PRN  melatonin, 6 mg, Nightly PRN Objective:    BP (!) 117/55   Pulse 91   Temp 98.9 °F (37.2 °C) (Oral)   Resp 18   Ht 5' 10\" (1.778 m)   Wt 165 lb (74.8 kg)   SpO2 100%   BMI 23.68 kg/m²   General Appearance: alert and oriented to person, place and time and in no acute distress  Skin: warm and dry  Head: normocephalic and atraumatic  Eyes: pupils equal, round, and reactive to light, extraocular eye movements intact, conjunctivae normal  Neck: neck supple and non tender without mass   Pulmonary/Chest: clear to auscultation bilaterally- no wheezes, rales or rhonchi, normal air movement, no respiratory distress  Cardiovascular: normal rate, normal S1 and S2 and no carotid bruits  Abdomen: soft, non-tender, non-distended, normal bowel sounds, no masses or organomegaly  Extremities: no cyanosis, no clubbing and no edema  Neurologic: no cranial nerve deficit and speech normal        Recent Labs     04/09/22  2144 04/09/22  2144 04/09/22  2213 04/10/22  0606 04/11/22  0235     --   --  135 138   K 3.7  --   --  3.4* 4.4   CL 89*  --   --  92* 98   CO2 39*  --   --  37* 34*   BUN 30*  --   --  23 23   CREATININE 1.0  --   --  0.8 0.7   GLUCOSE 128*   < > 120 173* 208*   CALCIUM 8.8  --   --  8.1* 8.3*    < > = values in this interval not displayed. Recent Labs     04/09/22 2144 04/11/22  0235   WBC 15.4* 15.3*   RBC 3.32* 2.99*   HGB 9.5* 8.4*   HCT 30.6* 27.4*   MCV 92.2 91.6   MCH 28.6 28.1   MCHC 31.0* 30.7*   RDW 20.0* 19.8*   * 455*   MPV 10.0 9.8       Radiology: None    Assessment:    Principal Problem:    Acute respiratory failure with hypoxia (HCC)  Active Problems:    Pneumonia  Resolved Problems:    * No resolved hospital problems. *      Plan:  1. Acute hypoxic failure secondary to PNA: CTA showing rigth lung ground glass opacities. Started on vanc, cefepime and azithromycin in ED. ID consulted for further recommendations. No further antibiotics warranted. Procal 0. 19. Respiratory viral panel negative.  Blood cultures pending. Urine ags pending. Pulmonary hygiene. Scheduled nebs. Currently on 4L NC. Wean as tolerated to keep SpO2 >92%.       2. Troponin, recent NSTEMI: Trending down. Likely type II with hypoxia and hypotension. Cardiac diet. Continue ASA and Statin. Lovenox 1 mg /kg BID for 48 hours. Cardiology consulted.      3. DM2L Low dose ssi. Hold home lantus due to poor intake. Hypoglycemic protocol. DM diet.       4. Htn: Holding home meds due to hypotension. Monitor.      5. Hyperlipidemia: Continue Lipitor. Lipid panel pending.      6. Hypothyroidism: Continue Synthroid. TSH pending.      7. Stage IV lungs cancer: Follows with the [facility] outpatient. Palliative care consulted. Palliative consulted. Patient and wife are interested in more information from [facility].      8. Hypokalemia: Resolved. Monitor. 9. Leukocytosis: Secondary to viral PNA. Monitor. 10. Hypophosphatemia: Supplement. Monitor. 11. Deconditioning: PT/OT consulted. NOTE: This report was transcribed using voice recognition software. Every effort was made to ensure accuracy; however, inadvertent computerized transcription errors may be present. Electronically signed by Louann Moses PA-C on 4/11/2022 at 9:23 AM  HOSPITALIST ATTENDING PHYSICIAN NOTE 4/11/2022 1736PM:    Details of the evaluation - subjective assessment (including medication profile, past medical, family and social history when applicable), examination, review of lab and test data, diagnostic impressions and medical decision making - performed by Louann Moses PA-C, were discussed with me on the date of service and I agree with clinical information herein unless otherwise noted. The patient has been evaluated by me personally earlier today.   Pt reports no fevers, chills,n/v.     Exam: heart reg at rate of 92,lungs cta, abd pos bs soft nt, ext neg for le edema    I agree with the assessment and plan of Roxana Mckenzie PA-C    Acute respiratory failure with hypoxia  Possible viral pneumonia  Lung cancer  NSTEMI  Elevated platelet count  htn  Hyperlipidemia  Hypothyroidism  Hypokalemia  Leukocytosis  Hypophosphatemia  Dm type 2 uncontrolled    Discussed options with pt. Will consult hem/onc per pt request for further guidance and possibility of hospice    Electronically signed by Bia Ybarra D.O.   Hospitalist  [facility]

## 2022-04-11 NOTE — FLOWSHEET NOTE
Initial Inpatient Wound Care    Admit Date: 4/9/2022  8:26 PM    Reason for consult:  Pressure injury on buttocks      Wound history:  POA  Findings: wife present    04/11/22 0929   Wound 03/14/22 Buttocks Inner;Right;Upper   Date First Assessed/Time First Assessed: 03/14/22 0357   Present on Hospital Admission: Yes  Location: Buttocks  Wound Location Orientation: Inner;Right;Upper   Wound Image    Wound Length (cm) 1 cm   Wound Width (cm) 1.5 cm   Wound Depth (cm)   (obs)   Wound Surface Area (cm^2) 1.5 cm^2   Change in Wound Size % (l*w) -200   Wound Assessment   (dried, yellow/brown)   Drainage Amount None   Odor None   Zoila-wound Assessment   (red)     Impression:  Chronic irritation rt inner buttocks POA      Plan: aquaphor  Sure prep to heels  Marian [address], APRN - CNS 4/11/2022 11:05 AM

## 2022-04-12 NOTE — PROGRESS NOTES
INPATIENT CARDIOLOGY FOLLOW-UP    Name: Lo Whalen    Age: 79 y.o. Date of Admission: 4/9/2022  8:26 PM    Date of Service: 4/12/2022    Chief Complaint: Follow-up for NSTEMI/elevated troponin. Interim History:  No new overnight cardiac complaints. Currently with no complaints of CP, SOB, palpitations, dizziness, or lightheadedness. SR on telemetry.     Review of Systems:   Negative except as described above    Problem List:  Patient Active Problem List   Diagnosis    Essential hypertension, benign    [facility] mellitus ([facility]    Hyperlipidemia    Malignant neoplasm of upper lobe of left lung ([facility]    [facility]) [facility]    NSTEMI, initial episode of care ([facility]    Acute respiratory failure with hypoxia ([facility]    Pneumonia    Malignant neoplasm of lung (HCC)    Leukocytosis    Hypophosphatemia       Current Medications:    Current Facility-Administered Medications:     enoxaparin (LOVENOX) injection 80 mg, 1 mg/kg, SubCUTAneous, BID, York Debbieter, APRN - CNP    white petrolatum ointment, , Topical, BID, Axel Hric, DO, Given at 04/12/22 0805    sodium chloride flush 0.9 % injection 5-40 mL, 5-40 mL, IntraVENous, 2 times per day, York Hotter, APRN - CNP, 10 mL at 04/12/22 0806    sodium chloride flush 0.9 % injection 5-40 mL, 5-40 mL, IntraVENous, PRN, York Hotter, APRN - CNP    0.9 % sodium chloride infusion, , IntraVENous, PRN, York Hotter, APRN - CNP    ondansetron (ZOFRAN-ODT) disintegrating tablet 4 mg, 4 mg, Oral, Q8H PRN **OR** ondansetron (ZOFRAN) injection 4 mg, 4 mg, IntraVENous, Q6H PRN, York Hotter, APRN - CNP    polyethylene glycol (GLYCOLAX) packet 17 g, 17 g, Oral, Daily PRN, York Hotter, APRN - CNP    acetaminophen (TYLENOL) tablet 650 mg, 650 mg, Oral, Q6H PRN, 650 mg at 04/10/22 1704 **OR** acetaminophen (TYLENOL) suppository 650 mg, 650 mg, Rectal, Q6H PRN, Sohail Corbin, APRN - CNP  Ardyth Moritz sennosides-docusate sodium (SENOKOT-S) 8.6-50 MG tablet 1 tablet, 1 tablet, Oral, BID, Bekah Goins, APRN - CNP, 1 tablet at 04/12/22 0805    ipratropium-albuterol (DUONEB) nebulizer solution 1 ampule, 1 ampule, Inhalation, Q4H WA, Bekah Goins, APRN - CNP, 1 ampule at 04/12/22 1210    0.9 % sodium chloride infusion, , IntraVENous, Continuous, Bekah Goins APRN - CNP, Last Rate: 100 mL/hr at 04/10/22 0240, New Bag at 04/10/22 0240    pantoprazole (PROTONIX) tablet 40 mg, 40 mg, Oral, QAM AC, Lokarthika Briseida, APRN - CNP, 40 mg at 04/12/22 2986    levothyroxine (SYNTHROID) tablet 50 mcg, 50 mcg, Oral, Daily, Jalena Briseida, APRN - CNP, 50 mcg at 04/12/22 7132    atorvastatin (LIPITOR) tablet 80 mg, 80 mg, Oral, Daily, Louetta Briseida, APRN - CNP, 80 mg at 04/11/22 2030    ferrous sulfate (IRON 325) tablet 325 mg, 325 mg, Oral, Daily, Louetta Jannetpe, APRN - CNP, 325 mg at 04/12/22 0805    FLUoxetine (PROZAC) capsule 20 mg, 20 mg, Oral, Daily, Peggyuetta Briseida, APRN - CNP, 20 mg at 33/79/06 8935    folic acid (FOLVITE) tablet 1 mg, 1 mg, Oral, Daily, Louetta Briseida, APRN - CNP, 1 mg at 04/12/22 0805    magnesium hydroxide (MILK OF MAGNESIA) 400 MG/5ML suspension 30 mL, 30 mL, Oral, Daily, Peggyuetta Briseida, APRN - CNP, 30 mL at 04/12/22 0805    benzonatate (TESSALON) capsule 100 mg, 100 mg, Oral, TID PRN, Yasmin Favorite, DO, 100 mg at 04/11/22 1040    aspirin chewable tablet 81 mg, 81 mg, Oral, Daily, Patience Garcia, APRN - CNP, 81 mg at 04/12/22 0805    glucagon (rDNA) injection 1 mg, 1 mg, IntraMUSCular, PRN, Frieda Morin PA-C    dextrose 5 % solution, 100 mL/hr, IntraVENous, PRN, Frieda Morin PA-C    dextrose bolus (hypoglycemia) 10% 125 mL, 125 mL, IntraVENous, PRN **OR** dextrose bolus (hypoglycemia) 10% 250 mL, 250 mL, IntraVENous, PRN, Frieda Morin PA-C    glucose chewable tablet 4 each, 4 each, Oral, PRN, Frieda Morin PA-C    dronabinol (MARINOL) capsule 2.5 mg, 2.5 mg, Oral, BID, Amal Jose Morin PA-C, 2.5 mg at 04/12/22 0805    insulin lispro (HUMALOG) injection vial 0-12 Units, 0-12 Units, SubCUTAneous, TID WC, Axel Hric, DO, 4 Units at 04/12/22 1054    insulin lispro (HUMALOG) injection vial 0-6 Units, 0-6 Units, SubCUTAneous, Nightly, Axel Hric, DO, 6 Units at 04/11/22 2029    insulin glargine (LANTUS) injection vial 20 Units, 20 Units, SubCUTAneous, Nightly, Axel Hric, DO, 20 Units at 04/11/22 2029    melatonin tablet 6 mg, 6 mg, Oral, Nightly PRN, ISHMAEL Smallwood - CNP, 6 mg at 04/11/22 2042    0.9 % sodium chloride bolus, 1,000 mL, IntraVENous, Once, Lindy Villagomez,     Physical Exam:  /84   Pulse 96   Temp 98.6 °F (37 °C) (Oral)   Resp 24   Ht 5' 10\" (1.778 m)   Wt 183 lb 10.3 oz (83.3 kg)   SpO2 92%   BMI 26.35 kg/m²   Wt Readings from Last 3 Encounters:   04/12/22 183 lb 10.3 oz (83.3 kg)   03/17/22 179 lb 3.7 oz (81.3 kg)   11/16/21 187 lb (84.8 kg)     Appearance: Awake, alert, no acute respiratory distress  Skin: Intact, no rash  Head: Normocephalic, atraumatic  Eyes: EOMI, no conjunctival erythema  ENMT: No pharyngeal erythema, MMM, no rhinorrhea  Neck: Supple, no elevated JVP, no carotid bruits  Lungs: Course rhonchi appreciated bilaterally. Cardiac: PMI nondisplaced, Regular rhythm with a normal rate, S1 & S2 normal, no murmurs  Abdomen: Soft, nontender, +bowel sounds  Extremities: Moves all extremities x 4, no lower extremity edema  Neurologic: No focal motor deficits apparent, normal mood and affect  Peripheral Pulses: Intact posterior tibial pulses bilaterally    Intake/Output:    Intake/Output Summary (Last 24 hours) at 4/12/2022 1301  Last data filed at 4/12/2022 0806  Gross per 24 hour   Intake 250 ml   Output 1200 ml   Net -950 ml     I/O this shift:  In: 250 [P.O.:240;  I.V.:10]  Out: -     Laboratory Tests:  Recent Labs     04/10/22  0606 04/11/22  0235 04/12/22  0443    138 139   K 3.4* 4.4 4.4   CL 92* 98 100   CO2 37* 34* 33*   BUN 23 23 14   CREATININE 0.8 0.7 0.5*   GLUCOSE 173* 208* 96   CALCIUM 8.1* 8.3* 8.5*     Lab Results   Component Value Date    MG 2.2 04/11/2022     Recent Labs     04/10/22  0606 04/11/22  0235 04/12/22  0443   ALKPHOS 65 64 69   ALT 21 22 27   AST 44* 45* 62*   PROT 5.7* 5.2* 5.5*   BILITOT 0.3 0.3 0.3   LABALBU 2.4* 2.1* 2.2*     Recent Labs     04/09/22  2144 04/11/22  0235 04/12/22  0443   WBC 15.4* 15.3* 21.3*   RBC 3.32* 2.99* 3.03*   HGB 9.5* 8.4* 8.6*   HCT 30.6* 27.4* 28.9*   MCV 92.2 91.6 95.4   MCH 28.6 28.1 28.4   MCHC 31.0* 30.7* 29.8*   RDW 20.0* 19.8* 19.8*   * 455* 446   MPV 10.0 9.8 10.0     Lab Results   Component Value Date    CKTOTAL 357 (H) 04/10/2022     Lab Results   Component Value Date    INR 1.4 03/14/2022    PROTIME 15.3 (H) 03/14/2022     Lab Results   Component Value Date    TSH 1.75 08/22/2018     No results found for: LABA1C  No results found for: EAG  Lab Results   Component Value Date    CHOL 120 02/17/2020    CHOL 116 02/26/2019    CHOL 128 08/22/2018     Lab Results   Component Value Date    TRIG 77 02/17/2020    TRIG 67 02/26/2019    TRIG 60 08/22/2018     Lab Results   Component Value Date    HDL 41 02/17/2020    HDL 42 02/26/2019    HDL 50 08/22/2018     Lab Results   Component Value Date    LDLCALC 63 02/17/2020    LDLCALC 60 02/26/2019    LDLCALC 64 08/22/2018     No results found for: LABVLDL, VLDL  Lab Results   Component Value Date    CHOLHDLRATIO 2.9 02/17/2020    CHOLHDLRATIO 2.8 02/26/2019    CHOLHDLRATIO 2.6 08/22/2018     Recent Labs     04/09/22  2144   PROBNP 584*       EKG 4/10/2022: sinus rhythm with PAC's. Telemetry: NSR    04/09/2022 CXR:   No significant change compared to the prior exam on 03/14/2022 demonstrating the left lung mass with left lower lobe collapse and pleural effusion.     04/09/2022 CTA Chest:   No evidence of pulmonary embolism.   New patchy ground-glass densities in the right lung most suggestive of viral  pneumonia versus drug reaction. No significant change in the left suprahilar large mass with invasion into  the mediastinum and complete obstruction of the left bronchus.  There is  stable complete collapse of the left lower lobe with small left pleural  effusion and left hemidiaphragm elevation. No significant change in left adrenal metastasis. -IMPRESSION:  1. NSTEMI, troponins peaked at 366 and down-trended. Conservative management.   2. Leukocytosis   3. Poor appetite and dehydration, 2/2 chemotherapy  4. Possible viral pneumonia   5. Stage IV Lung Cancer s/p radiation and starting Gemzar next week. 6. HTN, running low, observe. 7. HLD, on statin  8. DM, managed per primary   9. Peripheral neuropathy   10. Hypothyroidism, on synthroid   11. Anemia, observe   12. Hx prostate cancer s/p radiation. 13. Remote tobacco abuse. 14. Depression      RECOMMENDATIONS:     · Conservative medical management. · Continue statin and aspirin. · Monitor blood pressure, holding home lasix due to soft blood pressure and improving.    Has completed 72 hours with Lovenox. Will discontinue. Palliative medicine on board. Patient is considering hospice. Leaning towards conservative measures. We will continue with medical management. Will not pursue ischemic or any other aggressive measures. I will sign off today. Please not hesitate to call me with any questions or concerns. Electronically signed by Terri Chawla MD on 4/12/2022 at 1:01 PM    NOTE: This report was transcribed using voice recognition software. Every effort was made to ensure accuracy; however, inadvertent computerized transcription errors may be present.

## 2022-04-12 NOTE — PROGRESS NOTES
[address] [place] [address] [facility]  [facility]  Progress Note    SUBJECTIVE:  Chief Complaint   Patient presents with    Shortness of Breath     hx of lung CA. SpO2 in the 60's when EMS arrived at patients home     Patient is sitting up in bed  Feels a bit better today   Wife present  Appetite fair - ate some lunch   No fevers. 4LNC    Review of systems:  As stated above in the chief complaint, otherwise negative. Medications:  Scheduled Meds:   enoxaparin  1 mg/kg SubCUTAneous BID    white petrolatum   Topical BID    sodium chloride flush  5-40 mL IntraVENous 2 times per day    sennosides-docusate sodium  1 tablet Oral BID    ipratropium-albuterol  1 ampule Inhalation Q4H WA    pantoprazole  40 mg Oral QAM AC    levothyroxine  50 mcg Oral Daily    atorvastatin  80 mg Oral Daily    ferrous sulfate  325 mg Oral Daily    FLUoxetine  20 mg Oral Daily    folic acid  1 mg Oral Daily    magnesium hydroxide  30 mL Oral Daily    aspirin  81 mg Oral Daily    dronabinol  2.5 mg Oral BID    insulin lispro  0-12 Units SubCUTAneous TID WC    insulin lispro  0-6 Units SubCUTAneous Nightly    insulin glargine  20 Units SubCUTAneous Nightly    sodium chloride  1,000 mL IntraVENous Once     Continuous Infusions:   sodium chloride      sodium chloride 100 mL/hr at 04/10/22 0240    dextrose       PRN Meds:sodium chloride flush, sodium chloride, ondansetron **OR** ondansetron, polyethylene glycol, acetaminophen **OR** acetaminophen, benzonatate, glucagon (rDNA), dextrose, dextrose bolus (hypoglycemia) **OR** dextrose bolus (hypoglycemia), glucose, melatonin    OBJECTIVE:  /84   Pulse 96   Temp 98.6 °F (37 °C) (Oral)   Resp 24   Ht 5' 10\" (1.778 m)   Wt 183 lb 10.3 oz (83.3 kg)   SpO2 92%   BMI 26.35 kg/m²   Temp  Av.5 °F (36.9 °C)  Min: 98.4 °F (36.9 °C)  Max: 98.6 °F (37 °C)  Constitutional: The patient is awake, alert, and oriented. +weakness. Wife present    Skin: Warm and dry.  No rashes were noted. HEENT: Round and reactive pupils. Moist mucous membranes. No ulcerations or thrush. Neck: Supple to movements. Chest: No use of accessory muscles to breathe. Symmetrical expansion. Poor effort. 4LNC. No crackles  Cardiovascular: S1 and S2 are rhythmic and regular. No murmurs appreciated. Abdomen: Positive bowel sounds to auscultation. Benign to palpation. No masses felt. Extremities: No edema.   Lines: peripheral  Left mediport- accessed    Laboratory and Tests Review:  Lab Results   Component Value Date    WBC 21.3 (H) 04/12/2022    WBC 15.3 (H) 04/11/2022    WBC 15.4 (H) 04/09/2022    HGB 8.6 (L) 04/12/2022    HCT 28.9 (L) 04/12/2022    MCV 95.4 04/12/2022     04/12/2022     Lab Results   Component Value Date    NEUTROABS 18.90 (H) 04/12/2022    NEUTROABS 13.14 (H) 04/11/2022    NEUTROABS 13.31 (H) 04/09/2022     No results found for: CRP  Lab Results   Component Value Date    ALT 27 04/12/2022    AST 62 (H) 04/12/2022    ALKPHOS 69 04/12/2022    BILITOT 0.3 04/12/2022     Lab Results   Component Value Date     04/12/2022    K 4.4 04/12/2022     04/12/2022    CO2 33 04/12/2022    BUN 14 04/12/2022    CREATININE 0.5 04/12/2022    CREATININE 0.7 04/11/2022    CREATININE 0.8 04/10/2022    GFRAA >60 04/12/2022    LABGLOM >60 04/12/2022    GLUCOSE 96 04/12/2022    PROT 5.5 04/12/2022    LABALBU 2.2 04/12/2022    CALCIUM 8.5 04/12/2022    BILITOT 0.3 04/12/2022    ALKPHOS 69 04/12/2022    AST 62 04/12/2022    ALT 27 04/12/2022     No results found for: CRP  No results found for: [address]  Radiology:  Noted     Microbiology:   Blood cultures: negative so far  Respiratory Viral Panel: not detected  Streptococcus pneumoniae/Legionella urine Ag: pending     Recent Labs     04/09/22  2344 04/12/22  0443   PROCAL 0.19* 0.21*       ASSESSMENT:  · Lung cancer, undergoing chemotherapy with Gemzar  · Poor appetite and dehydration associated to chemotherapy  · Possible viral

## 2022-04-12 NOTE — CARE COORDINATION
Social work / Discharge Planning:        Patient and wife met with [facility]. They are interested in speaking to oncology again before making any decisions. Social work continues to follow to assist with discharge planning.    Electronically signed by WHITNEY Love on 4/12/2022 at 1:10 PM

## 2022-04-12 NOTE — PROGRESS NOTES
[facility] Progress Note    Admitting Date and Time: 4/9/2022  8:26 PM  Admit Dx: Pneumonia [J18.9]  Acute respiratory failure with hypoxia ([address] [J96.01]  Sepsis due to pneumonia ([address] [J18.9, A41.9]  Pneumonia of right lung due to infectious organism, unspecified part of lung [J18.9]    Subjective:  Patient is being followed for Pneumonia [J18.9]  Acute respiratory failure with hypoxia ([address] [J96.01]  Sepsis due to pneumonia ([address] [J18.9, A41.9]  Pneumonia of right lung due to infectious organism, unspecified part of lung [J18.9]      Patient was lying in bed in no acute distress. Wife at bedside. Wife and patient are interested in Hospice but would like to talk to oncology before making any decisions. ROS: denies fever, chills, cp, sob, n/v, HA unless stated above.       enoxaparin  1 mg/kg SubCUTAneous BID    white petrolatum   Topical BID    sodium chloride flush  5-40 mL IntraVENous 2 times per day    sennosides-docusate sodium  1 tablet Oral BID    ipratropium-albuterol  1 ampule Inhalation Q4H WA    pantoprazole  40 mg Oral QAM AC    levothyroxine  50 mcg Oral Daily    atorvastatin  80 mg Oral Daily    ferrous sulfate  325 mg Oral Daily    FLUoxetine  20 mg Oral Daily    folic acid  1 mg Oral Daily    magnesium hydroxide  30 mL Oral Daily    aspirin  81 mg Oral Daily    dronabinol  2.5 mg Oral BID    insulin lispro  0-12 Units SubCUTAneous TID WC    insulin lispro  0-6 Units SubCUTAneous Nightly    insulin glargine  20 Units SubCUTAneous Nightly    sodium chloride  1,000 mL IntraVENous Once     sodium chloride flush, 5-40 mL, PRN  sodium chloride, , PRN  ondansetron, 4 mg, Q8H PRN   Or  ondansetron, 4 mg, Q6H PRN  polyethylene glycol, 17 g, Daily PRN  acetaminophen, 650 mg, Q6H PRN   Or  acetaminophen, 650 mg, Q6H PRN  benzonatate, 100 mg, TID PRN  glucagon (rDNA), 1 mg, PRN  dextrose, 100 mL/hr, PRN  dextrose bolus (hypoglycemia), 125 mL, PRN   Or  dextrose bolus (hypoglycemia), 250 mL, PRN  glucose, 4 each, PRN  melatonin, 6 mg, Nightly PRN         Objective:    /84   Pulse 96   Temp 98.6 °F (37 °C) (Oral)   Resp 24   Ht 5' 10\" (1.778 m)   Wt 183 lb 10.3 oz (83.3 kg)   SpO2 92%   BMI 26.35 kg/m²   General Appearance: alert and oriented to person, place and time and in no acute distress  Skin: warm and dry  Head: normocephalic and atraumatic  Eyes: pupils equal, round, and reactive to light, extraocular eye movements intact, conjunctivae normal  Neck: neck supple and non tender without mass   Pulmonary/Chest: clear to auscultation bilaterally- no wheezes, rales or rhonchi, normal air movement, no respiratory distress  Cardiovascular: normal rate, normal S1 and S2 and no carotid bruits  Abdomen: soft, non-tender, non-distended, normal bowel sounds, no masses or organomegaly  Extremities: no cyanosis, no clubbing and no edema  Neurologic: no cranial nerve deficit and speech normal        Recent Labs     04/10/22  0606 04/11/22  0235 04/12/22  0443    138 139   K 3.4* 4.4 4.4   CL 92* 98 100   CO2 37* 34* 33*   BUN 23 23 14   CREATININE 0.8 0.7 0.5*   GLUCOSE 173* 208* 96   CALCIUM 8.1* 8.3* 8.5*       Recent Labs     04/09/22  2144 04/11/22  0235 04/12/22  0443   WBC 15.4* 15.3* 21.3*   RBC 3.32* 2.99* 3.03*   HGB 9.5* 8.4* 8.6*   HCT 30.6* 27.4* 28.9*   MCV 92.2 91.6 95.4   MCH 28.6 28.1 28.4   MCHC 31.0* 30.7* 29.8*   RDW 20.0* 19.8* 19.8*   * 455* 446   MPV 10.0 9.8 10.0       Radiology: None    Assessment:    Principal Problem:    Acute respiratory failure with hypoxia (HCC)  Active Problems:    Pneumonia    Malignant neoplasm of lung (HCC)    Leukocytosis    Hypophosphatemia  Resolved Problems:    * No resolved hospital problems. *      Plan:  1. Acute hypoxic failure secondary to PNA: CTA showing rigth lung ground glass opacities. Started on vanc, cefepime and azithromycin in ED. ID consulted for further recommendations.  No further antibiotics warranted. Procal 0. 19. Respiratory viral panel negative. Blood cultures negative so far. Pulmonary hygiene. Scheduled nebs. Currently on 4L NC. Wean as tolerated to keep SpO2 >92%.       2. Troponin, recent NSTEMI: Trending down. Likely type II with hypoxia and hypotension. Cardiac diet. Continue ASA and Statin. Lovenox 1 mg /kg BID for 48 hours. Cardiology consulted.      3. DM2L Low dose ssi. Hold home lantus due to poor intake. Hypoglycemic protocol. DM diet.       4. Htn: Holding home meds due to hypotension. Monitor.      5. Hyperlipidemia: Continue Lipitor. Lipid panel pending.      6. Hypothyroidism: Continue Synthroid. TSH pending.      7. Stage IV lungs cancer: Follows with the [facility] outpatient. Palliative care consulted. Palliative consulted. Patient and wife are interested in more information from  Alejandra Bowser. Heme/onc consulted per patient's request.      8. Hypokalemia: Resolved. Monitor. 9. Leukocytosis: Secondary to viral PNA. Monitor. 10. Hypophosphatemia: Supplement. Monitor. 11. Deconditioning: PT/OT consulted. Dispo: Patient is interested in hospice but would like to speak with oncology before making a decision. NOTE: This report was transcribed using voice recognition software. Every effort was made to ensure accuracy; however, inadvertent computerized transcription errors may be present. Electronically signed by Gelacio Maravilla PA-C on 4/12/2022 at 8:44 AM   HOSPITALIST ATTENDING PHYSICIAN NOTE 4/12/2022 1754PM:    Details of the evaluation - subjective assessment (including medication profile, past medical, family and social history when applicable), examination, review of lab and test data, diagnostic impressions and medical decision making - performed by Gelacio Maravilla PA-C, were discussed with me on the date of service and I agree with clinical information herein unless otherwise noted.     The patient has been evaluated by me personally earlier today. Pt reports no fevers, chills,n/v.     Exam: heart reg at rate of 98,lungs occasional rhonchi, abd pos bs soft nt, ext neg for le edema    I agree with the assessment and plan of Roxana Morin PA-C    Acute respiratory failure with hypoxia  Possible viral pneumonia  Lung cancer  NSTEMI  Elevated platelet count  htn  Hyperlipidemia  Hypothyroidism  Hypokalemia  Leukocytosis  Hypophosphatemia  Dm type 2 uncontrolled        Electronically signed by Aziza Swift D.O.   Hospitalist  [facility]

## 2022-04-12 NOTE — PROGRESS NOTES
Subjective:  Chart reviewed  Patient seen at bedside with wife  Feels better today but SOB, denies pain  Eating very little  They are planning on pursing hospice services on discharge, patient didn't tolerate last dose of chemotherapy well and his performance status is declining, wife unable to care for him at home    Objective:    /84   Pulse 96   Temp 98.6 °F (37 °C) (Oral)   Resp 24   Ht 5' 10\" (1.778 m)   Wt 183 lb 10.3 oz (83.3 kg)   SpO2 92%   BMI 26.35 kg/m²     General: awake and alert, dyspnea  HEENT: normocephalic/atraumatic, mucosa dry, sclera anicteric, conjuntiva pink, holding left eye closed  NECK: supple  Heart:  sinus rhythm,no murmurs, gallops, or rubs.   Lungs:  Diminished breath sounds on left, right wheezing and crackles, wet cough  Abd: BS present, nontender, nondistended, no masses  Extrem:  Edema of LE  Skin: Intact, no petechia or purpura, per extremity exam    CBC with Differential:    Lab Results   Component Value Date    WBC 21.3 04/12/2022    RBC 3.03 04/12/2022    HGB 8.6 04/12/2022    HCT 28.9 04/12/2022     04/12/2022    MCV 95.4 04/12/2022    MCH 28.4 04/12/2022    MCHC 29.8 04/12/2022    RDW 19.8 04/12/2022    LYMPHOPCT 2.9 04/12/2022    MONOPCT 6.6 04/12/2022    EOSPCT 0.0 12/26/2019    BASOPCT 0.2 04/12/2022    MONOSABS 1.41 04/12/2022    LYMPHSABS 0.61 04/12/2022    EOSABS 0.09 04/12/2022    BASOSABS 0.05 04/12/2022     CMP:    Lab Results   Component Value Date     04/12/2022    K 4.4 04/12/2022     04/12/2022    CO2 33 04/12/2022    BUN 14 04/12/2022    CREATININE 0.5 04/12/2022    GFRAA >60 04/12/2022    LABGLOM >60 04/12/2022    GLUCOSE 96 04/12/2022    PROT 5.5 04/12/2022    LABALBU 2.2 04/12/2022    CALCIUM 8.5 04/12/2022    BILITOT 0.3 04/12/2022    ALKPHOS 69 04/12/2022    AST 62 04/12/2022    ALT 27 04/12/2022          Current Facility-Administered Medications:     enoxaparin (LOVENOX) injection 80 mg, 1 mg/kg, SubCUTAneous, BID, [facility] Carlie, APRN - CNP    white petrolatum ointment, , Topical, BID, Axel Hric, DO, Given at 04/12/22 0805    sodium chloride flush 0.9 % injection 5-40 mL, 5-40 mL, IntraVENous, 2 times per day, Sydnie Dino, APRN - CNP, 10 mL at 04/12/22 0806    sodium chloride flush 0.9 % injection 5-40 mL, 5-40 mL, IntraVENous, PRN, Sydnie Sun, APRN - CNP    0.9 % sodium chloride infusion, , IntraVENous, PRN, Sydnie Dino, APRN - CNP    ondansetron (ZOFRAN-ODT) disintegrating tablet 4 mg, 4 mg, Oral, Q8H PRN **OR** ondansetron (ZOFRAN) injection 4 mg, 4 mg, IntraVENous, Q6H PRN, Sydnie Dino, APRN - CNP    polyethylene glycol (GLYCOLAX) packet 17 g, 17 g, Oral, Daily PRN, Sydnie Sun APRN - CNP    acetaminophen (TYLENOL) tablet 650 mg, 650 mg, Oral, Q6H PRN, 650 mg at 04/10/22 1704 **OR** acetaminophen (TYLENOL) suppository 650 mg, 650 mg, Rectal, Q6H PRN, Sydnie Sun, APRN - CNP    sennosides-docusate sodium (SENOKOT-S) 8.6-50 MG tablet 1 tablet, 1 tablet, Oral, BID, Sydnie Sun APRN - CNP, 1 tablet at 04/12/22 0805    ipratropium-albuterol (DUONEB) nebulizer solution 1 ampule, 1 ampule, Inhalation, Q4H WA, Sydnie Sun APRN - CNP, 1 ampule at 04/12/22 1210    0.9 % sodium chloride infusion, , IntraVENous, Continuous, Sydnie Sun APRN - CNP, Last Rate: 100 mL/hr at 04/10/22 0240, New Bag at 04/10/22 0240    pantoprazole (PROTONIX) tablet 40 mg, 40 mg, Oral, QAM AC, Sydnie Sun, APRN - CNP, 40 mg at 04/12/22 2295    levothyroxine (SYNTHROID) tablet 50 mcg, 50 mcg, Oral, Daily, Sydnie Dino, APRN - CNP, 50 mcg at 04/12/22 9528    atorvastatin (LIPITOR) tablet 80 mg, 80 mg, Oral, Daily, Sydnie Dino, APRN - CNP, 80 mg at 04/11/22 2030    ferrous sulfate (IRON 325) tablet 325 mg, 325 mg, Oral, Daily, Sydnie Dino, APRN - CNP, 325 mg at 04/12/22 0805    FLUoxetine (PROZAC) capsule 20 mg, 20 mg, Oral, Daily, Sydnie Dino, APRN - CNP, 20 mg at 04/12/22 3394    folic acid (FOLVITE) tablet 1 mg, 1 mg, Oral, Daily, Adrienne Lie, APRN - CNP, 1 mg at 04/12/22 0805    magnesium hydroxide (MILK OF MAGNESIA) 400 MG/5ML suspension 30 mL, 30 mL, Oral, Daily, Adrienne Lie, APRN - CNP, 30 mL at 04/12/22 0805    benzonatate (TESSALON) capsule 100 mg, 100 mg, Oral, TID PRN, Shayy Montgomeryp, DO, 100 mg at 04/11/22 1040    aspirin chewable tablet 81 mg, 81 mg, Oral, Daily, Dulce Lius, APRN - CNP, 81 mg at 04/12/22 0805    glucagon (rDNA) injection 1 mg, 1 mg, IntraMUSCular, PRN, Marian Morin PA-C    dextrose 5 % solution, 100 mL/hr, IntraVENous, PRN, Marian Morin PA-C    dextrose bolus (hypoglycemia) 10% 125 mL, 125 mL, IntraVENous, PRN **OR** dextrose bolus (hypoglycemia) 10% 250 mL, 250 mL, IntraVENous, PRN, Marian Morin PA-C    glucose chewable tablet 4 each, 4 each, Oral, PRN, Marian Morin PA-C    dronabinol (MARINOL) capsule 2.5 mg, 2.5 mg, Oral, BID, Amal Jose Morin PA-C, 2.5 mg at 04/12/22 0805    insulin lispro (HUMALOG) injection vial 0-12 Units, 0-12 Units, SubCUTAneous, TID WC, Axel Hric, DO, 4 Units at 04/12/22 1054    insulin lispro (HUMALOG) injection vial 0-6 Units, 0-6 Units, SubCUTAneous, Nightly, Axel Hric, DO, 6 Units at 04/11/22 2029    insulin glargine (LANTUS) injection vial 20 Units, 20 Units, SubCUTAneous, Nightly, Axel Hric, DO, 20 Units at 04/11/22 2029    melatonin tablet 6 mg, 6 mg, Oral, Nightly PRN, Adrienne Lie, APRN - CNP, 6 mg at 04/11/22 2042    0.9 % sodium chloride bolus, 1,000 mL, IntraVENous, Once, Miguelangel Dee,     CTA PULMONARY W CONTRAST   Final Result   No evidence of pulmonary embolism. New patchy ground-glass densities in the right lung most suggestive of viral   pneumonia versus drug reaction. No significant change in the left suprahilar large mass with invasion into   the mediastinum and complete obstruction of the left bronchus.   There is   stable complete collapse of the left lower lobe with small left pleural   effusion and left hemidiaphragm elevation. No significant change in left adrenal metastasis. XR CHEST PORTABLE   Final Result   No significant change compared to the prior exam on 03/14/2022 demonstrating   the left lung mass with left lower lobe collapse and pleural effusion. 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 PDL1 expressed TPS 2%, negative for EGFR, BRAF, ALK, ROS1. Treated with Carbo/Taxol from 11/20/19 to 1/2/20 and last RT was on 1/6/20 followed by consolidation immunotherapy Imfinzi (anti-PDL1) from 2/13/20 to 6/4/20. Evidence of disease progression on 6/9/20 consistent with stage IV disease. Treated with 4 cycles of Carbo and Alimta on 6/24/2020 to 8/24/20. Maintenance Alimta from 9/16/20. Had progression. Treated with combination Ipi/Nivo from 6/2/21 with partial response. Now with progression. Started Gemzar on 1/26/2022. Last treatment 3/9/2022 with dose reduction. A/P:  Metastatic adenocarcinoma of the lung - patient with declining PS an recurrent hospitalization, does not wish to receive additional treatments. Morphine IV for SOB or pain for patient  Discharge planning with Hospice  We will continue to follow supportively  I spoke with patient's wife and reviewed imaging with her and answered all their questions. She thinks he is giving up and wants to be comfortable and she can no longer take care of him at home. Comfort provided at beside. Chase Becerra PA-C    Thank you for allowing us to participate in the care of Cielo Yu. Chase Becerra PA-C  [phone]    Electronically signed by KANDY Palma on 4/12/2022 at 2:03 PM    Note: This report was completed using [employer] voiced recognition software.   Every effort has been made to ensure accuracy; however, inadvertent computerized transcription errors may be present.

## 2022-04-12 NOTE — PROGRESS NOTES
Palliative Care Department  [phone]  Palliative Care Progress Note  Provider Berenice Gibson, ISHMAEL - CNP    Oscar Cruz  [id number]  Hospital Day: 4  Date of Initial Consult: 4/10/2022  Referring Provider: Gable Closs CNP  Palliative Medicine was consulted for assistance with: [address], famiily support, symptom management  Chief Complaint Today:  Cough, dry mouth    Brief Summary of Hospital Course:   Oscar Cruz is a 79 y.o. with a medical history of stage 4 lung cancer who was admitted on 4/9/2022 from home with a CHIEF COMPLAINT of SOB. ASSESSMENT/PLAN:     Recommendations:     Cough :   -  Prn tessalon perles      Pertinent Hospital Problems      Stage 4 lung cancer with immunocompromise from chemotherapy now with right sided pneumonia      Palliative Care Encounter / Counseling Regarding Goals of Care  Please see detailed goals of care discussion as below   At this time, Oscar Cruz, Does have capacity for medical decision-making. Capacity is time limited and situation/question specific   During encounter did not need surrogate medical decision-maker   Outcome of goals of care meeting: Follow-up with patient/family tomorrow for further goals of care discussion   Code status Limited no to all but meds   Advanced Directives: no POA or living will in [facility]   Surrogate/Legal NOK:  o Wife Mark Walters     Spiritual assessment: no spiritual distress identified  Bereavement and grief: to be determined  Referrals to: none today    SUBJECTIVE:     Details of Conversation:  Met with patient and wife at bedside. Patient's wife states that they spoke with hospice yesterday and are considering options. They are awaiting to speak with oncology today before making further decisions. They ask that palliative medicine follow-up tomorrow regarding goals of care discussion.       History Of Present Illness:    Per H&P  \"Mr Cabrera presented to the ED with stage IV lung cancer followed with Dr. Palacio at [facility] complaints of dyspnea, loss of appetite with 9 lbs weight loss and generalized weakness that started about 2 weeks ago. Patient is wheelchair-bound and has been near syncopal when attempting to stand. He has had little to eat or drink in the past two weeks. Home pulse ox showed low 70s and is currently on 4L. He is on active chemo and radiation but has not received treatment due to the above symptoms for the past 3 weeks. On room air in the ED he was 82%, on the 4L he is 94-95%. Initially he was hypotensive 70/45 but improved to 124/61 with 2L crystalloid bolus. CTA chest was done and showed new right lung ground glass densities. He was treated in the ED with vanc, zithro, cefepime and will be admitted for pneumonia. \"    Past Medical History:          Diagnosis Date    Diabetes mellitus without complication ([address]     Epiretinal membrane, left eye 10/2019    Full dentures     History of depression     Hyperlipidemia     Hypertension     Hypothyroidism     Peripheral neuropathy     feet, affects balance at times    Prostate cancer [facility])     treated with radiation    Use of cane as ambulatory aid        Past Surgical History:          Procedure Laterality Date    APPENDECTOMY      BRONCHOSCOPY N/A 10/24/2019    BRONCHOSCOPY WITH ANESTHESIA (CYTOLOGY NOTIFIED) performed by Deepa Holloway MD at [address] [place] [address] Bilateral     CIRCUMCISION      COLONOSCOPY      EYE SURGERY      eyelids    TOOTH EXTRACTION      full mouth    VITRECTOMY Left 2/14/2019    PARS PLANA VITRECTOMY 25 GAUGE MACULAR PUCKER REPAIR AIR FLUID EXCHANGE LEFT EYE performed by Daniella Connor MD at [facility] VITRECTOMY Left 10/10/2019    PARS PLANA VITRECTOMY 25 GAUGE INTERNAL LIMITING MEMBRANE PEEL OZURDEX INJECTION  LEFT YE E performed by Daniella Connor MD at [facility] OR       Medications Prior to Admission:      Prior to Admission medications Medication Sig Start Date End Date Taking? Authorizing Provider   insulin glargine (BASAGLAR KWIKPEN) 100 UNIT/ML injection pen Inject 50 Units into the skin nightly   Yes Historical Provider, MD   nystatin (MYCOSTATIN) 823848 UNIT/GM powder Apply topically 2 times daily Apply 3 times daily. 4/6/22   Altagracia Caputo DO   FLUoxetine (PROZAC) 20 MG capsule Take 1 capsule by mouth daily 4/4/22   Altagracia Caputo DO   sennosides-docusate sodium (SENOKOT-S) 8.6-50 MG tablet Take 2 tablets by mouth nightly 3/17/22 4/16/22  KANDY Albert   magnesium hydroxide (MILK OF MAGNESIA) 400 MG/5ML suspension Take 30 mLs by mouth daily 3/17/22 4/16/22  KANDY Albert   insulin lispro (HUMALOG) 100 UNIT/ML injection vial Inject 0-12 Units into the skin 3 times daily (with meals) 3/17/22   HERBER Dumont   furosemide (LASIX) 40 MG tablet Take 0.5 tablets by mouth daily 3/17/22   HERBER Dumont   pantoprazole (PROTONIX) 40 MG tablet Take 40 mg by mouth daily    Historical Provider, MD   ferrous sulfate (IRON 325) 325 (65 Fe) MG tablet Take 325 mg by mouth daily    Historical Provider, MD   folic acid (FOLVITE) 1 MG tablet Take 1 mg by mouth daily    Historical Provider, MD   diphenhydrAMINE-APAP, sleep, (TYLENOL PM EXTRA STRENGTH)  MG tablet Take 1 tablet by mouth nightly as needed for Sleep    Historical Provider, MD   Coenzyme Q10 (CO Q 10 PO) Take 200 mg by mouth daily     Historical Provider, MD   [address] TEST 4 TIMES DAILY 9/10/19   Historical Provider, MD   CONTOUR NEXT TEST strip TEST FOUR TIMES DAILY 9/10/19   Historical Provider, MD   atorvastatin (LIPITOR) 80 MG tablet Take 80 mg by mouth daily     Historical Provider, MD   levothyroxine (SYNTHROID) 25 MCG tablet Take 50 mcg by mouth Daily  9/15/16   Historical Provider, MD   aspirin 81 MG tablet Take 81 mg by mouth daily     Historical Provider, MD       Allergies:  Patient has no known allergies.     Social History:      The patient currently lives at home    TOBACCO:   reports that he quit smoking about 37 years ago. His smoking use included cigarettes. He has a 37.50 pack-year smoking history. He has never used smokeless tobacco.  ETOH:   reports current alcohol use. Family History:       Reviewed in detail and positive as follows:        Problem Relation Age of Onset    Diabetes type 2  Mother     Heart Failure Mother     Heart Attack Father        REVIEW OF SYSTEMS:   Pertinent positives as noted in the HPI. All other systems reviewed and negative.     OBJECTIVE:   Prognosis: Poor and Guarded    Physical Exam:  /84   Pulse 96   Temp 98.6 °F (37 °C) (Oral)   Resp 24   Ht 5' 10\" (1.778 m)   Wt 183 lb 10.3 oz (83.3 kg)   SpO2 92%   BMI 26.35 kg/m²     Constitutional:  Elderly, awake, alert, appears uncomfortable, quiet/hoarse voice  Eyes: no scleral icterus, normal lids, no discharge  ENMT:  Normocephalic, atraumatic, mucosa dry, EOMI  Neck:  trachea midline, no JVD  Lungs:  audible rhonchi ,no wheezes noted, respirations unlabored but tachypneic, no retractions  Heart:  RRR, distant heart tones, no murmur, rub, or gallop noted during exam  Abd:  Soft, non tender, non distended, bowel sounds present  Ext:  Moving all extremities, no edema, pulses present  Skin:  Warm and dry, no rashes on visible skin, pale  Neuro:  Alert, grossly nonfocal; following commands    Objective data reviewed: labs, images, records, medication use, vitals and chart    Labs:     Recent Labs     04/09/22  2144 04/11/22  0235 04/12/22  0443   WBC 15.4* 15.3* 21.3*   HGB 9.5* 8.4* 8.6*   HCT 30.6* 27.4* 28.9*   * 455* 446     Recent Labs     04/10/22  0606 04/11/22  0235 04/12/22  0443    138 139   K 3.4* 4.4 4.4   CL 92* 98 100   CO2 37* 34* 33*   BUN 23 23 14   CREATININE 0.8 0.7 0.5*   CALCIUM 8.1* 8.3* 8.5*   PHOS  --  1.9*  --      Recent Labs     04/10/22  0606 04/11/22  0235 04/12/22  0443   AST 44* 45* 62*   ALT 21 22 27   BILITOT 0.3 0.3 0.3   ALKPHOS 65 64 69     No results for input(s): INR in the last 72 hours. Recent Labs     04/10/22  0606   CKTOTAL 357*       Urinalysis:      Lab Results   Component Value Date    NITRU Negative 07/01/2017    BLOODU Negative 07/01/2017    SPECGRAV 1.015 07/01/2017    GLUCOSEU Negative 07/01/2017         Discussed patient and the plan of care with the other IDT members: Palliative Medicine IDT Team, Floor Nurse, Patient and Family    Time/Communication  Greater than 50% of time spent, total 15 minutes in counseling and coordination of care at the bedside regarding goals of care, symptom management, diagnosis and prognosis and see above. Thank you for allowing Palliative Medicine to participate in the care of Rosalio CarbajalRafael

## 2022-04-13 NOTE — PROGRESS NOTES
Occupational Therapy  Date:4/13/2022  Patient Name: Arya Bonilla  Room: 9917/0831-E     Occupational Therapy (OT) order received, patient's medical record reviewed, and OT evaluation attempted this morning with RN approval; patient asleep and visitor/family at bedside declined facilitation of OT evaluation at this time, noting her preference for patient to be allowed to rest. OT evaluation to be re-attempted at later date, as able/appropriate. Colleen Iniguez, OTR/L  License Number: [id number]

## 2022-04-13 NOTE — DISCHARGE SUMMARY
[address] Physician Discharge Summary       No follow-up provider specified. Activity level: As tolerated     Dispo: Hospice House      Condition on discharge: poor prognosis    Patient ID:  Doe Montez  [id number]  70 y.o.  1940    Admit date: 4/9/2022    Discharge date and time:  4/13/2022  4:09 PM    Admission Diagnoses: Principal Problem:    Acute respiratory failure with hypoxia ([address]  Active Problems:    Pneumonia    Malignant neoplasm of lung (HCC)    Leukocytosis    Hypophosphatemia    Hypothyroidism  Resolved Problems:    * No resolved hospital problems. *      Discharge Diagnoses: Principal Problem:    Acute respiratory failure with hypoxia ([address]  Active Problems:    Pneumonia    Malignant neoplasm of lung (HCC)    Leukocytosis    Hypophosphatemia    Hypothyroidism  Resolved Problems:    * No resolved hospital problems. *      Consults:  IP CONSULT TO INFECTIOUS DISEASES  IP CONSULT TO PALLIATIVE CARE  IP CONSULT TO CARDIOLOGY  IP CONSULT TO HOSPICE  IP CONSULT TO ONCOLOGY  IP CONSULT TO IV TEAM    Hospital Course:   Patient Doe Montez is a 79 y.o. presented with Pneumonia [J18.9]  Acute respiratory failure with hypoxia ([address] [J96.01]  Sepsis due to pneumonia ([address] [J18.9, A41.9]  Pneumonia of right lung due to infectious organism, unspecified part of lung [J18.9]    Mr Kvng Salmeron is an 79year old man with hx of metastatic adenocarcinoma lung, recent NSTEMI, prior prostate CA s/p radiation, DM II, hypothyroidism, anemia and HLD. Admitted 4/10 with anorexia, weight loss, increasing weakness with recurrent near-syncopal events and worsening hypoxia despite home O2. Not tolerating chemotherapy. Concern for HCAP on admission, CTA indicated GGOs right lung. ID consulted and felt more c/w a viral pneumonia and no antibiotics recommended. Had ongoing elevated troponins with recent NSTEMI (discharged 3/17).  Cardiology consulted and recommended conservative medical management. Since admission, off antibiotics and remained afebrile. Consultation to oncology service felt patient would not tolerate any further chemotherapy. He met criteria for inpatient hospice with worsening performance status, anorexia,, weight loss and worsening hypoxia. Not tolerating chemotherapy. Agreeable to discharge to [facility]. Discharge Exam:   see progress note same date    I/O last 3 completed shifts: In: 370 [P.O.:360; I.V.:10]  Out: 700 [Urine:700]  No intake/output data recorded. LABS:  Recent Labs     04/11/22 0235 04/12/22 0443 04/13/22 0452    139 142   K 4.4 4.4 4.7   CL 98 100 105   CO2 34* 33* 31*   BUN 23 14 12   CREATININE 0.7 0.5* 0.5*   GLUCOSE 208* 96 56*   CALCIUM 8.3* 8.5* 8.2*       Recent Labs     04/11/22 0235 04/12/22 0443 04/13/22 0452   WBC 15.3* 21.3* 20.3*   RBC 2.99* 3.03* 3.09*   HGB 8.4* 8.6* 8.9*   HCT 27.4* 28.9* 29.9*   MCV 91.6 95.4 96.8   MCH 28.1 28.4 28.8   MCHC 30.7* 29.8* 29.8*   RDW 19.8* 19.8* 19.9*   * 446 461*   MPV 9.8 10.0 10.1       No results for input(s): POCGLU in the last 72 hours. Imaging:  XR CHEST PORTABLE    Result Date: 4/9/2022  EXAMINATION: ONE XRAY VIEW OF THE CHEST 4/9/2022 7:33 pm COMPARISON: Chest x-ray 03/14/2022 CTA chest 03/14/2022 HISTORY: ORDERING SYSTEM PROVIDED HISTORY: Stage IV lung cancer, acute hypoxia TECHNOLOGIST PROVIDED HISTORY: Reason for exam:->Stage IV lung cancer, acute hypoxia FINDINGS: A large left upper lobe paramediastinal mass is again seen. Collapse of the left lower lobe is noted with left hemidiaphragm elevation, unchanged. Probable left pleural effusion is stable. The right lung and pleural space are clear. The heart size is obscured. Left chest port is again seen, stable in position. Decreased bone mineralization is noted.      No significant change compared to the prior exam on 03/14/2022 demonstrating the left lung mass with left lower lobe collapse and pleural effusion. CTA PULMONARY W CONTRAST    Result Date: 4/9/2022  EXAMINATION: CTA OF THE CHEST 4/9/2022 8:53 pm TECHNIQUE: CTA of the chest was performed after the administration of intravenous contrast.  Multiplanar reformatted images are provided for review. MIP images are provided for review. Dose modulation, iterative reconstruction, and/or weight based adjustment of the mA/kV was utilized to reduce the radiation dose to as low as reasonably achievable. COMPARISON: CTA chest 03/14/2022 HISTORY: ORDERING SYSTEM PROVIDED HISTORY: Stage IV lung cancer, acute hypoxia, concern for PE TECHNOLOGIST PROVIDED HISTORY: Reason for exam:->Stage IV lung cancer, acute hypoxia, concern for PE Decision Support Exception - unselect if not a suspected or confirmed emergency medical condition->Emergency Medical Condition (MA) FINDINGS: Pulmonary Arteries: Pulmonary arteries are adequately opacified for evaluation. No evidence of intraluminal filling defect to suggest pulmonary embolism. Main pulmonary artery is normal in caliber. Extrinsic compression of the distal left pulmonary artery and its branches is again seen by the left suprahilar mass. Mediastinum: The heart is normal in size. No pericardial effusion is seen. Atherosclerotic vascular calcification including extensive coronary artery calcification is noted. Previously seen infiltrative soft tissue density within the mediastinum and left hilum has not significantly changed compared to the recent CT. Infiltrative soft tissue extends to the jory and occludes the left bronchus. Lungs/pleura: The left suprahilar/upper lobe mass has not significantly changed in size measuring 10.0 x 7.7 x 6.5 cm. Complete collapse of the left lower lobe is again seen. An associated small left pleural effusion is stable. New patchy ground-glass opacities are seen in the right lung, most pronounced near the hilum. Prominent left hemidiaphragm elevation is again noted.  Upper Abdomen: Previously seen left adrenal mass has not significantly changed measuring 3.9 x 3.2 cm, previously 3.5 x 3.1 cm. Soft Tissues/Bones: No acute bone or soft tissue abnormality. No evidence of pulmonary embolism. New patchy ground-glass densities in the right lung most suggestive of viral pneumonia versus drug reaction. No significant change in the left suprahilar large mass with invasion into the mediastinum and complete obstruction of the left bronchus. There is stable complete collapse of the left lower lobe with small left pleural effusion and left hemidiaphragm elevation. No significant change in left adrenal metastasis. Patient Instructions:      Medication List      START taking these medications    dronabinol 2.5 MG capsule  Commonly known as: MARINOL  Take 1 capsule by mouth 2 times daily for 2 days. Full Kit Nebulizer Set Misc  Use as directed with nebulized medication. insulin glargine 100 UNIT/ML injection vial  Commonly known as: LANTUS  Inject 20 Units into the skin nightly  Replaces: Basaglar KwikPen 100 UNIT/ML injection pen     ipratropium-albuterol 0.5-2.5 (3) MG/3ML Soln nebulizer solution  Commonly known as: DUONEB  Inhale 3 mLs into the lungs every 4 hours (while awake)     morphine (PF) 2 MG/ML Soln injection  Infuse 1 mL intravenously every 2 hours as needed (air hunger) for up to 2 days.      white petrolatum Oint ointment  Apply topically 2 times daily        CONTINUE taking these medications    aspirin 81 MG tablet     CO Q 10 PO     Contour Next Test strip  Generic drug: blood glucose test strips     ferrous sulfate 325 (65 Fe) MG tablet  Commonly known as: IRON 325     FLUoxetine 20 MG capsule  Commonly known as: PROZAC  Take 1 capsule by mouth daily     folic acid 1 MG tablet  Commonly known as: FOLVITE     furosemide 40 MG tablet  Commonly known as: LASIX  Take 0.5 tablets by mouth daily     insulin lispro 100 UNIT/ML injection vial  Commonly known as: HUMALOG  Inject 0-12 Units into the skin 3 times daily (with meals)     levothyroxine 25 MCG tablet  Commonly known as: SYNTHROID     Lipitor 80 MG tablet  Generic drug: atorvastatin     magnesium hydroxide 400 MG/5ML suspension  Commonly known as: Milk of Magnesia  Take 30 mLs by mouth daily     Microlet Lancets Misc     nystatin 067900 UNIT/GM powder  Commonly known as: MYCOSTATIN  Apply topically 2 times daily Apply 3 times daily.      pantoprazole 40 MG tablet  Commonly known as: PROTONIX     sennosides-docusate sodium 8.6-50 MG tablet  Commonly known as: SENOKOT-S  Take 2 tablets by mouth nightly     Tylenol PM Extra Strength  MG tablet  Generic drug: diphenhydrAMINE-APAP (sleep)        STOP taking these medications    ammonium lactate 12 % cream  Commonly known as: AMLACTIN     Basaglar KwikPen 100 UNIT/ML injection pen  Generic drug: insulin glargine  Replaced by: insulin glargine 100 UNIT/ML injection vial           Where to Get Your Medications      You can get these medications from any pharmacy    Bring a paper prescription for each of these medications  · dronabinol 2.5 MG capsule  · morphine (PF) 2 MG/ML Soln injection     Information about where to get these medications is not yet available    Ask your nurse or doctor about these medications  · Full Kit Nebulizer Set Misc  · insulin glargine 100 UNIT/ML injection vial  · ipratropium-albuterol 0.5-2.5 (3) MG/3ML Soln nebulizer solution  · white petrolatum Oint ointment           Note that more than 30 minutes was spent in preparing discharge papers, discussing discharge with patient, medication review, etc.    Signed:  Electronically signed by ISHMAEL Mcdonald CNP on 4/13/2022 at 4:09 PM  HOSPITALIST ATTENDING PHYSICIAN NOTE 4/13/2022 2024PM:    Details of the evaluation - subjective assessment (including medication profile, past medical, family and social history when applicable), examination, review of lab and test data, diagnostic impressions and medical decision making - performed by ISHAMEL Hilario CNP, were discussed with me on the date of service and I agree with clinical information herein unless otherwise noted. The patient has been evaluated by me personally earlier today. Pt reports no fevers, chills,n/v.       BP (!) 141/86   Pulse 89   Temp 97.5 °F (36.4 °C) (Axillary)   Resp 24   Ht 5' 10\" (1.778 m)   Wt 187 lb 13.3 oz (85.2 kg)   SpO2 92%   BMI 26.95 kg/m²   Exam: heart reg at rate of 88,lungs cta, abd pos bs soft nt, ext neg for le edema    I agree with the assessment and plan of ISHMAEL Hilario CNP    Acute respiratory failure with hypoxia  Possible viral pneumonia  Lung cancer  NSTEMI  Elevated platelet count  htn  Hyperlipidemia  Hypothyroidism  Hypokalemia  Leukocytosis  Hypophosphatemia  Dm type 2 uncontrolled      Total time for discharge is 37 min    Electronically signed by Samantha Martin D.O.   Hospitalist  [facility] Hospitalist [facility] at [facility]

## 2022-04-13 NOTE — PROGRESS NOTES
In to talk with patient and his wife, Jamie Root. We reviewed hospice philosophy and services, she was under the impression he could stay at the hospice house for 90 days. He is lethargic, but mentally responding appropriately. His respirations are labored and he is anxious. Heart rate is regular, rate in 90's 4+ edema to bilateral upper and lower extremities, knees are cool to touch. She is concerned about the POC, after we get him comfortable at the hospice house. She is unable to care for him at home, and unable to afford the financial piece for room and board at [facility] with hospice services being covered by medicare. He was approved by [facility] for [facility] level of care for symptom management of dyspnea, and anxiety, for terminal diagnosis of hypoxic respiratory failure d/t metastatic carcinoma of the lung. Awaiting wife's discussion with her sister in law before continuing with admission. I will continue to follow and proceed with [facility] admission when wife approves. [phone] Call from charge nurse Suzie Willis, patients wife ready for patient to transfer to the [place] [id number]. [facility] ambulance called and unable to accommodate transfer. [facility] ambulance called and will transport at [address]. Report called to [facility] at the [facility], patient to be accepted into room 116. Consents signed. Please leave mediport in place for transfer.   Thank you, Kaylah Hill -[phone]

## 2022-04-13 NOTE — PROGRESS NOTES
[address] [place] [address] [facility]  LILLYREYNALDO  Progress Note    SUBJECTIVE:  Chief Complaint   Patient presents with    Shortness of Breath     hx of lung CA. SpO2 in the 60's when EMS arrived at patients home     Patient is resting comfortably in bed  Wife at bedside  Wife says she spoke with hem/onc and is going to speak with hospice again     Review of systems:  As stated above in the chief complaint, otherwise negative. Medications:  Scheduled Meds:   enoxaparin  1 mg/kg SubCUTAneous BID    white petrolatum   Topical BID    sodium chloride flush  5-40 mL IntraVENous 2 times per day    sennosides-docusate sodium  1 tablet Oral BID    ipratropium-albuterol  1 ampule Inhalation Q4H WA    pantoprazole  40 mg Oral QAM AC    levothyroxine  50 mcg Oral Daily    atorvastatin  80 mg Oral Daily    ferrous sulfate  325 mg Oral Daily    FLUoxetine  20 mg Oral Daily    folic acid  1 mg Oral Daily    magnesium hydroxide  30 mL Oral Daily    aspirin  81 mg Oral Daily    dronabinol  2.5 mg Oral BID    insulin lispro  0-12 Units SubCUTAneous TID WC    insulin lispro  0-6 Units SubCUTAneous Nightly    insulin glargine  20 Units SubCUTAneous Nightly    sodium chloride  1,000 mL IntraVENous Once     Continuous Infusions:   sodium chloride      sodium chloride Stopped (22 0950)    dextrose       PRN Meds:morphine, sodium chloride flush, sodium chloride, ondansetron **OR** ondansetron, polyethylene glycol, acetaminophen **OR** acetaminophen, benzonatate, glucagon (rDNA), dextrose, dextrose bolus (hypoglycemia) **OR** dextrose bolus (hypoglycemia), glucose, melatonin    OBJECTIVE:  BP (!) 141/86   Pulse 89   Temp 97.5 °F (36.4 °C) (Axillary)   Resp 24   Ht 5' 10\" (1.778 m)   Wt 187 lb 13.3 oz (85.2 kg)   SpO2 92%   BMI 26.95 kg/m²   Temp  Av.9 °F (36.6 °C)  Min: 97.5 °F (36.4 °C)  Max: 98.6 °F (37 °C)  Constitutional: The patient is resting comfortably in bed. In no distress.  Wife is chemotherapy  · Possible viral pneumonia. No evidence of lung consolidation consistent with bacterial pneumonia  · Leukocytosis    PLAN:  · Family is to speak with hospice again and pursue comfort measures   · Antibiotics are not warranted  · ID will sign off     ISHMAEL Hernandez CNP  10:50 AM  4/13/2022     Patient seen and examined. I had a face to face encounter with the patient. Agree with exam.  Assessment and plan as outlined above and directed by me. Addition and corrections were done as deemed appropriate. My exam and plan include: Spoke with wife. They will be going to hospice. ID will sign off.     Aidan Prakash MD  4/13/2022  11:57 AM

## 2022-04-13 NOTE — CARE COORDINATION
Social work / Discharge Planning:        Patient's RN provided update in [facility] that patient's wife wants to speak to [phone] again today. Social work contacted Erick Dalton from [employer] and she will be coming to the floor soon to meet with patient's wife. Awaiting plan.    Electronically signed by WHITNEY Martin on 4/13/2022 at 9:50 AM

## 2022-04-13 NOTE — PROGRESS NOTES
[facility] Progress Note    Admitting Date and Time: 4/9/2022  8:26 PM  Admit Dx: Pneumonia [J18.9]  Acute respiratory failure with hypoxia ([address] [J96.01]  Sepsis due to pneumonia ([address] [J18.9, A41.9]  Pneumonia of right lung due to infectious organism, unspecified part of lung [J18.9]      Assessment:    Principal Problem:    Acute respiratory failure with hypoxia ([address]  Active Problems:    Pneumonia    Malignant neoplasm of lung ([address]    Leukocytosis    Hypophosphatemia    Hypothyroidism  Resolved Problems:    * No resolved hospital problems. *      Plan:  1. Metastatic adenocarcinoma Lung, on chemotherapy  - notes progressive worsening performance status, anorexia,, weight loss and worsening hypoxia. Not tolerating chemotherapy. Oncology and hospice services consulted. Oncology did not feel further chemotherapy would be tolerated. Hospice discussion ongoing and await follow up today     2. Chronic troponin leak, recent NSTEMI  - probable ongoing demand ischemia  No plans for intervention. Medical management with ASA, statin    3. DM II  - sliding scale available  Poor oral intake/failure to thrive    4. HTN  - diuretics and antihypertensives on hold due to lower readings    5. Leukocytosis  - related to malignancy, probable viral pneumonia  No bacterial PNA identified. Off Abx    6. Disposition: pending further discussion with hospice. Inpatient vs Home      Subjective:  Patient is being followed for Pneumonia [J18.9]  Acute respiratory failure with hypoxia ([address] [J96.01]  Sepsis due to pneumonia ([address] [J18.9, A41.9]  Pneumonia of right lung due to infectious organism, unspecified part of lung [J18.9]     Mr Beryl Rios is an 79year old man with hx of metastatic adenocarcinoma lung, recent NSTEMI, prior prostate CA s/p radiation, DM II, hypothyroidism, anemia and HLD.   Admitted 4/10 with anorexia, weight loss, increasing weakness with recurrent near-syncopal events and worsening hypoxia despite home O2. Not tolerating chemotherapy. Concern for HCAP on admission, CTA indicated GGOs right lung. ID consulted and felt more c/w a viral pneumonia and no antibiotics recommended. Had ongoing elevated troponins with recent NSTEMI (discharged 3/17). Cardiology consulted and recommended conservative medical management. Since admission, consideration for hospice care. 4/13 -> no overnight events. Has been comfortable, wife notes appears to be gasping for air. Receiving IV morphine PRN  Plans to discuss further with hospice today.     ROS: denies fever, chills, or acute pain     enoxaparin  1 mg/kg SubCUTAneous BID    white petrolatum   Topical BID    sodium chloride flush  5-40 mL IntraVENous 2 times per day    sennosides-docusate sodium  1 tablet Oral BID    ipratropium-albuterol  1 ampule Inhalation Q4H WA    pantoprazole  40 mg Oral QAM AC    levothyroxine  50 mcg Oral Daily    atorvastatin  80 mg Oral Daily    ferrous sulfate  325 mg Oral Daily    FLUoxetine  20 mg Oral Daily    folic acid  1 mg Oral Daily    magnesium hydroxide  30 mL Oral Daily    aspirin  81 mg Oral Daily    dronabinol  2.5 mg Oral BID    insulin lispro  0-12 Units SubCUTAneous TID WC    insulin lispro  0-6 Units SubCUTAneous Nightly    insulin glargine  20 Units SubCUTAneous Nightly    sodium chloride  1,000 mL IntraVENous Once     morphine, 2 mg, Q2H PRN  sodium chloride flush, 5-40 mL, PRN  sodium chloride, , PRN  ondansetron, 4 mg, Q8H PRN   Or  ondansetron, 4 mg, Q6H PRN  polyethylene glycol, 17 g, Daily PRN  acetaminophen, 650 mg, Q6H PRN   Or  acetaminophen, 650 mg, Q6H PRN  benzonatate, 100 mg, TID PRN  glucagon (rDNA), 1 mg, PRN  dextrose, 100 mL/hr, PRN  dextrose bolus (hypoglycemia), 125 mL, PRN   Or  dextrose bolus (hypoglycemia), 250 mL, PRN  glucose, 4 each, PRN  melatonin, 6 mg, Nightly PRN         Objective:    BP (!) 141/86   Pulse 89   Temp 97.5 °F (36.4 °C) (Axillary)   Resp 24   Ht 5' 10\" (1.778 m)   Wt 187 lb 13.3 oz (85.2 kg)   SpO2 92%   BMI 26.95 kg/m²     General Appearance: resting, sleepy but arouses. No acute pain  Skin: warm and dry  Head: normocephalic and atraumatic  Eyes: pupils equal, round, and reactive to light, extraocular eye movements intact, conjunctivae normal  Neck: neck supple and non tender without mass   Pulmonary/Chest: diminished, poor basilar air entry  Cardiovascular: normal rate, normal S1 and S2 and no carotid bruits  Abdomen: soft, non-tender, non-distended, normal bowel sounds, no masses or organomegaly  Extremities: no cyanosis, no clubbing and no edema  Neurologic: no focal deficits        Recent Labs     04/11/22 0235 04/12/22 0443 04/13/22 0452    139 142   K 4.4 4.4 4.7   CL 98 100 105   CO2 34* 33* 31*   BUN 23 14 12   CREATININE 0.7 0.5* 0.5*   GLUCOSE 208* 96 56*   CALCIUM 8.3* 8.5* 8.2*       Recent Labs     04/11/22 0235 04/12/22 0443 04/13/22  0452   WBC 15.3* 21.3* 20.3*   RBC 2.99* 3.03* 3.09*   HGB 8.4* 8.6* 8.9*   HCT 27.4* 28.9* 29.9*   MCV 91.6 95.4 96.8   MCH 28.1 28.4 28.8   MCHC 30.7* 29.8* 29.8*   RDW 19.8* 19.8* 19.9*   * 446 461*   MPV 9.8 10.0 10.1       Radiology:     NOTE: This report was transcribed using voice recognition software. Every effort was made to ensure accuracy; however, inadvertent computerized transcription errors may be present.   Electronically signed by ISHMAEL Vincent CNP on 4/13/2022 at 9:50 AM

## 2022-04-13 NOTE — PROGRESS NOTES
Subjective:  Chart reviewed  Hospice following and patient was accepted to inpatient hospice  Wife to speak with her EMIYL before making a decision    Objective:    BP (!) 141/86   Pulse 89   Temp 97.5 °F (36.4 °C) (Axillary)   Resp 24   Ht 5' 10\" (1.778 m)   Wt 187 lb 13.3 oz (85.2 kg)   SpO2 92%   BMI 26.95 kg/m²     General:sleeping currently  Heart:  sinus rhythm,no murmurs, gallops, or rubs.   Lungs:  Respirations easy, not labored currently    CBC with Differential:    Lab Results   Component Value Date    WBC 20.3 04/13/2022    RBC 3.09 04/13/2022    HGB 8.9 04/13/2022    HCT 29.9 04/13/2022     04/13/2022    MCV 96.8 04/13/2022    MCH 28.8 04/13/2022    MCHC 29.8 04/13/2022    RDW 19.9 04/13/2022    LYMPHOPCT 4.3 04/13/2022    MONOPCT 6.1 04/13/2022    EOSPCT 0.0 12/26/2019    BASOPCT 0.3 04/13/2022    MONOSABS 1.25 04/13/2022    LYMPHSABS 0.88 04/13/2022    EOSABS 0.12 04/13/2022    BASOSABS 0.06 04/13/2022     CMP:    Lab Results   Component Value Date     04/13/2022    K 4.7 04/13/2022     04/13/2022    CO2 31 04/13/2022    BUN 12 04/13/2022    CREATININE 0.5 04/13/2022    GFRAA >60 04/13/2022    LABGLOM >60 04/13/2022    GLUCOSE 56 04/13/2022    PROT 4.6 04/13/2022    LABALBU 2.3 04/13/2022    CALCIUM 8.2 04/13/2022    BILITOT 0.2 04/13/2022    ALKPHOS 76 04/13/2022    AST 69 04/13/2022    ALT 36 04/13/2022          Current Facility-Administered Medications:     morphine (PF) injection 2 mg, 2 mg, IntraVENous, Q2H PRN, Axel Hric, DO, 2 mg at 04/13/22 1348    enoxaparin (LOVENOX) injection 80 mg, 1 mg/kg, SubCUTAneous, BID, Eulene Sample, APRN - CNP, 80 mg at 04/13/22 0803    white petrolatum ointment, , Topical, BID, Axel Okeefe, DO, Given at 04/13/22 0814    sodium chloride flush 0.9 % injection 5-40 mL, 5-40 mL, IntraVENous, 2 times per day, Wes Sample, APRN - CNP, 10 mL at 04/13/22 0805    sodium chloride flush 0.9 % injection 5-40 mL, 5-40 mL, IntraVENous, PRN, ISHMAEL Gutierrez - CNP    0.9 % sodium chloride infusion, , IntraVENous, PRN, Tuyet Hart APRN - CNP    ondansetron (ZOFRAN-ODT) disintegrating tablet 4 mg, 4 mg, Oral, Q8H PRN **OR** ondansetron (ZOFRAN) injection 4 mg, 4 mg, IntraVENous, Q6H PRN, ISHMAEL Gutierrez - CNP    polyethylene glycol (GLYCOLAX) packet 17 g, 17 g, Oral, Daily PRN, ISHMAEL Gutierrez - CNP    acetaminophen (TYLENOL) tablet 650 mg, 650 mg, Oral, Q6H PRN, 650 mg at 04/10/22 1704 **OR** acetaminophen (TYLENOL) suppository 650 mg, 650 mg, Rectal, Q6H PRN, ISHMAEL Gutierrez - CNP    sennosides-docusate sodium (SENOKOT-S) 8.6-50 MG tablet 1 tablet, 1 tablet, Oral, BID, ISHMAEL Gutierrez - CNP, 1 tablet at 04/13/22 0803    ipratropium-albuterol (DUONEB) nebulizer solution 1 ampule, 1 ampule, Inhalation, Q4H WA, ISHMAEL Gutierrez - CNP, 1 ampule at 04/13/22 0831    0.9 % sodium chloride infusion, , IntraVENous, Continuous, ISHMAEL Gutierrez - CNP, Stopped at 04/13/22 0950    pantoprazole (PROTONIX) tablet 40 mg, 40 mg, Oral, QAM AC, ISHMAEL Gutierrez - CNP, 40 mg at 04/13/22 0555    levothyroxine (SYNTHROID) tablet 50 mcg, 50 mcg, Oral, Daily, ISHMAEL Gutierrez - CNP, 50 mcg at 04/13/22 0555    atorvastatin (LIPITOR) tablet 80 mg, 80 mg, Oral, Daily, ISHMAEL Gutierrez - CNP, 80 mg at 04/12/22 2009    ferrous sulfate (IRON 325) tablet 325 mg, 325 mg, Oral, Daily, Tuyet Hart APRN - CNP, 325 mg at 04/13/22 0803    FLUoxetine (PROZAC) capsule 20 mg, 20 mg, Oral, Daily, ISHMAEL Gutierrez - CNP, 20 mg at 64/72/18 6195    folic acid (FOLVITE) tablet 1 mg, 1 mg, Oral, Daily, ISHMAEL Gutierrez CNP, 1 mg at 04/13/22 0803    magnesium hydroxide (MILK OF MAGNESIA) 400 MG/5ML suspension 30 mL, 30 mL, Oral, Daily, ISHMAEL Gutierrez - CNP, 30 mL at 04/13/22 0803    benzonatate (TESSALON) capsule 100 mg, 100 mg, Oral, TID PRN, Kristin Burkitt, DO, 100 mg at 04/11/22 1040    aspirin chewable tablet 81 mg, 81 mg, Oral, Daily, Jose Frederick APRN - CNP, 81 mg at 04/13/22 0803    glucagon (rDNA) injection 1 mg, 1 mg, IntraMUSCular, PRN, KANDY Deal-C    dextrose 5 % solution, 100 mL/hr, IntraVENous, PRN, Georgina Morin, PA-C    dextrose bolus (hypoglycemia) 10% 125 mL, 125 mL, IntraVENous, PRN **OR** dextrose bolus (hypoglycemia) 10% 250 mL, 250 mL, IntraVENous, PRN, Georgina Morin, PA-C    glucose chewable tablet 4 each, 4 each, Oral, PRN, Georgina Morin PA-C    dronabinol (MARINOL) capsule 2.5 mg, 2.5 mg, Oral, BID, Amal Jose Morin, PA-C, 2.5 mg at 04/13/22 0803    insulin lispro (HUMALOG) injection vial 0-12 Units, 0-12 Units, SubCUTAneous, TID WC, Axel Hric, DO, 4 Units at 04/12/22 1542    insulin lispro (HUMALOG) injection vial 0-6 Units, 0-6 Units, SubCUTAneous, Nightly, Axel Hric, DO, 1 Units at 04/12/22 2014    insulin glargine (LANTUS) injection vial 20 Units, 20 Units, SubCUTAneous, Nightly, Axel Hric, DO, 20 Units at 04/12/22 2014    melatonin tablet 6 mg, 6 mg, Oral, Nightly PRN, Ulysses Posey, APRN - CNP, 6 mg at 04/12/22 2009    0.9 % sodium chloride bolus, 1,000 mL, IntraVENous, Once, Marilynne Pale, DO    CTA PULMONARY W CONTRAST   Final Result   No evidence of pulmonary embolism. New patchy ground-glass densities in the right lung most suggestive of viral   pneumonia versus drug reaction. No significant change in the left suprahilar large mass with invasion into   the mediastinum and complete obstruction of the left bronchus. There is   stable complete collapse of the left lower lobe with small left pleural   effusion and left hemidiaphragm elevation. No significant change in left adrenal metastasis. XR CHEST PORTABLE   Final Result   No significant change compared to the prior exam on 03/14/2022 demonstrating   the left lung mass with left lower lobe collapse and pleural effusion. 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 PDL1 expressed TPS 2%, negative for EGFR, BRAF, ALK, ROS1. Treated with Carbo/Taxol from 11/20/19 to 1/2/20 and last RT was on 1/6/20 followed by consolidation immunotherapy Imfinzi (anti-PDL1) from 2/13/20 to 6/4/20. Evidence of disease progression on 6/9/20 consistent with stage IV disease. Treated with 4 cycles of Carbo and Alimta on 6/24/2020 to 8/24/20. Maintenance Alimta from 9/16/20. Had progression. Treated with combination Ipi/Nivo from 6/2/21 with partial response. Now with progression. Started Gemzar on 1/26/2022. Last treatment 3/9/2022 with dose reduction. A/P:  Metastatic adenocarcinoma of the lung - patient with declining PS an recurrent hospitalization, does not wish to receive additional treatments. Patient's performance status too poor to continue systemic treatments. His wife is deciding if she is agreeable for his transfer to inpatient hospice with [facility]. Chevy Caballero PA-C    Thank you for allowing us to participate in the care of Yovany Squires. Chevy Caballero PA-C  [phone]    Electronically signed by KANDY Wills on 4/13/2022 at 2:12 PM    Note: This report was completed using [employer] voiced recognition software. Every effort has been made to ensure accuracy; however, inadvertent computerized transcription errors may be present.

## 2022-04-13 NOTE — PROGRESS NOTES
P Quality Flow/Interdisciplinary Rounds Progress Note        Quality Flow Rounds held on April 13, 2022    Disciplines Attending:  Bedside Nurse, ,  and Nursing Unit Jenni was admitted on 4/9/2022  8:26 PM    Anticipated Discharge Date:       Disposition:    Eliecer Score:  Eliecer Scale Score: 14    Readmission Risk              Risk of Unplanned Readmission:  25           Discussed patient goal for the day, patient clinical progression, and barriers to discharge. The following Goal(s) of the Day/Commitment(s) have been identified:  Check Palliative plans re: Hospice, family to talk to Oncology today.       Gemma West RN  April 13, 2022

## 2022-04-14 NOTE — PROGRESS NOTES
Patient medicated with morphine for travel discomfort assisted onto gurney with ambulance transport team.  Wife and sister bedside, belongings with them.

## 2022-04-14 NOTE — PROGRESS NOTES
house on 4/13/2022 for [facility] level of care for symptom management related to terminal diagnosis of metastatic adenocarcinoma of the lung. SUBJECTIVE:   Chart reviewed and spoke with bedside RN. Had periods of dyspnea, restlessness, and congestion overnight. He received comfort medications IV route. He has required 3 as needed doses of glycopyrrolate, 4 as needed doses of lorazepam, 4 as needed dose of morphine sulfate since his admission yesterday evening for comfort. He was seen and examined at the bedside with his wife present. He is lying in bed with eyes closed and appears to be in no apparent distress. He is minimally responsive and nonverbal.  He is with relaxed facial features and body posture. He does have some audible tracheal congestion. He is unable to provide history. Family Meeting: Met with patient's wife at the bedside. We discussed patient's cancer history and events leading up and to transition to hospice care. We discussed symptom management and medications being utilized for his comfort. Explained that we will schedule around-the-clock medications today based on his requirements overnight. She is agreeable to plan of care. We discussed his exam findings and respiratory pattern. We discussed what to expect as patient transitions towards end-of-life and some of the physical exam finding she may see. All questions were answered to her satisfaction. Emotional support was provided.     Review of Systems   Unable to perform ROS: Patient unresponsive      OBJECTIVE:     Past Medical History:   Diagnosis Date    Diabetes mellitus without complication ([facility] [address]     Epiretinal membrane, left eye 10/2019    Full dentures     History of depression     Hyperlipidemia     Hypertension     Hypothyroidism     Peripheral neuropathy     feet, affects balance at times    Prostate cancer [facility])     treated with radiation    Use of cane as ambulatory aid        Past Surgical History: Procedure Laterality Date    APPENDECTOMY      BRONCHOSCOPY N/A 10/24/2019    BRONCHOSCOPY WITH ANESTHESIA (CYTOLOGY NOTIFIED) performed by Celena Wagner MD at [address] [place] [address] Bilateral     CIRCUMCISION      COLONOSCOPY      EYE SURGERY      eyelids    TOOTH EXTRACTION      full mouth    VITRECTOMY Left 2/14/2019    PARS PLANA VITRECTOMY 25 GAUGE MACULAR PUCKER REPAIR AIR FLUID EXCHANGE LEFT EYE performed by Caio Torres MD at [address] VITRECTOMY Left 10/10/2019    PARS PLANA VITRECTOMY 25 GAUGE INTERNAL LIMITING MEMBRANE PEEL OZURDEX INJECTION  LEFT YE E performed by [facility]io Torres MD at [facility] OR       Family History   Problem Relation Age of Onset    Diabetes type 2  Mother     Heart Failure Mother     Heart Attack Father        Social History     Tobacco History     Smoking Status  Former Smoker Quit date  1/1/1985 Smoking Frequency  1.5 packs/day for 25 years (37.5 pk yrs) Smoking Tobacco Type  Cigarettes    Smokeless Tobacco Use  Never Used          Alcohol History     Alcohol Use Status  Yes Comment  Whiskey          Drug Use     Drug Use Status  No          Sexual Activity     Sexually Active  Yes Partners  Female                No Known Allergies     Imaging:   CTA OF THE CHEST 4/9/2022  Impression   No evidence of pulmonary embolism.       New patchy ground-glass densities in the right lung most suggestive of viral   pneumonia versus drug reaction.       No significant change in the left suprahilar large mass with invasion into   the mediastinum and complete obstruction of the left bronchus.  There is   stable complete collapse of the left lower lobe with small left pleural   effusion and left hemidiaphragm elevation.       No significant change in left adrenal metastasis. Vital Signs:   98.5 °F, , RR 44, /66  Physical Exam  Vitals and nursing note reviewed. Constitutional:       General: He is not in acute distress.      Appearance: He is overweight. He is ill-appearing. Interventions: Nasal cannula in place. Comments: Ill-appearing elderly male lying in bed with eyes closed, minimally responsive   HENT:      Head: Normocephalic and atraumatic. Comments: Brow relaxed     Mouth/Throat:      Mouth: Mucous membranes are pale and dry. Neck:      Vascular: No JVD. Trachea: Abnormal tracheal secretions present. Cardiovascular:      Rate and Rhythm: Tachycardia present. Rhythm irregular. Pulses: Decreased pulses. Radial pulses are 1+ on the right side and 1+ on the left side. Dorsalis pedis pulses are 1+ on the right side and 1+ on the left side. Heart sounds: Heart sounds are distant. Comments: 1+ generalized edema, 2+ soft edema BUE  Pulmonary:      Effort: Tachypnea present. No accessory muscle usage or respiratory distress. Breath sounds: Examination of the right-lower field reveals decreased breath sounds. Examination of the left-lower field reveals decreased breath sounds. Decreased breath sounds and rhonchi (Coarse throughout) present. No wheezing. Comments: 3 L O2 via nasal cannula  Abdominal:      General: Bowel sounds are decreased. There is distension (softly). Palpations: Abdomen is soft. Tenderness: There is no abdominal tenderness. There is no guarding. Genitourinary:     Comments: Guevara catheter  Musculoskeletal:      Cervical back: Neck supple. Right lower le+ Pitting Edema present. Left lower le+ Pitting Edema present. Lymphadenopathy:      Cervical: No cervical adenopathy. Skin:     General: Skin is warm and dry. Capillary Refill: Capillary refill takes more than 3 seconds. Coloration: Skin is mottled (knees bilaterally) and pale. Findings: Ecchymosis (scattered BUE) and wound (Stage II wound buttocks) present.    Neurological:      Comments: Minimally responsive, unable to adequately assess cranial nerves, eyes closed, nonverbal, relaxed posture   Psychiatric:         Speech: He is noncommunicative. Behavior: Behavior is not agitated. Physical Function:  PPS: 10%    Objective data reviewed: labs, images, records, medication use, vitals and chart    ASSESSMENT/PLAN:   Terminal diagnosis: Metastatic adenocarcinoma of the lung    Metastatic adenocarcinoma of the lung   In 2019 patient developed a persistent cough and x-ray revealed a left upper lobe mass.  Bronchoscopy was performed and biopsy revealed invasive poorly differentiated adenocarcinoma.  He underwent chemotherapy and radiation therapy.  In May 2020 when he developed a new adrenal mass and was on immunotherapy until June 2021.  He had further disease progression and was restarted on chemotherapy and his last treatment was on 3/9/2022.  He was admitted for anorexia, weight loss, and weakness with near syncopal events and worsening hypoxia.  Due to poor tolerance to treatment and worsening performance status, he decided to stop aggressive care and pursue comfort measures with hospice.  Terminal diagnosis. Dyspnea  · He was started on morphine sulfate 2 mg IV as needed overnight and required 4 as needed doses since his admission. · Schedule morphine sulfate 2 mg IV every 6 hours around-the-clock and continue every 2 hours as needed for pain or dyspnea with parameters to hold for respiratory rate below 8/min. · Low flow supplemental oxygen as needed for comfort. Neoplasm related pain   Unable to obtain full pain history from patient due to level of consciousness.  PAINAD score 2/10.  Continue morphine sulfate as above. Anxiety/agitation  · He was started on lorazepam 0.5 mg IV as needed overnight requiring 4 doses since admission. · Schedule lorazepam 0.5 mg IV every 6 hours around-the-clock and continue every 2 hours as needed for anxiety or agitation.     Congestion  · He is audibly congested with coarse lung sounds on exam today.  · Continue glycopyrrolate 0.4 mg IV every 4 hours as needed for congestion. Hospice Patient   · Enrolled for terminal diagnosis of metastatic adenocarcinoma of the lung. · Goal of care for comfort and a peaceful death. · DNR-CC  · Patient is appropriate for continued GIP level of care due to uncontrolled symptoms requiring close monitoring of medication adjustments. Prognosis: days-to-weeks    Spiritual assessment: no spiritual distress identified  Bereavement and grief: to be determined    Discussed patient and the plan of care with the other IDT members: Nursing staff, social work, patient's family at the bedside. Time/Communication  Greater than 50% of time spent, total 70 minutes in counseling and coordination of care at the bedside regarding goals of care, symptom management, diagnosis and prognosis and see above. Thank you for allowing [facility] to participate in the care of Danny Massey. Note: This report was completed using [email or web address] voiced recognition software. Every effort has been made to ensure accuracy; however, inadvertent computerized transcription errors may be present.
